# Patient Record
Sex: FEMALE
[De-identification: names, ages, dates, MRNs, and addresses within clinical notes are randomized per-mention and may not be internally consistent; named-entity substitution may affect disease eponyms.]

---

## 2018-02-18 ENCOUNTER — HOSPITAL ENCOUNTER (OUTPATIENT)
Dept: HOSPITAL 88 - ER | Age: 63
Setting detail: OBSERVATION
LOS: 2 days | Discharge: HOME | End: 2018-02-20
Attending: INTERNAL MEDICINE | Admitting: INTERNAL MEDICINE
Payer: MEDICARE

## 2018-02-18 VITALS — WEIGHT: 196.13 LBS | HEIGHT: 64 IN | BODY MASS INDEX: 33.48 KG/M2

## 2018-02-18 VITALS — SYSTOLIC BLOOD PRESSURE: 157 MMHG | DIASTOLIC BLOOD PRESSURE: 88 MMHG

## 2018-02-18 VITALS — DIASTOLIC BLOOD PRESSURE: 88 MMHG | SYSTOLIC BLOOD PRESSURE: 157 MMHG

## 2018-02-18 DIAGNOSIS — G47.00: ICD-10-CM

## 2018-02-18 DIAGNOSIS — E66.9: ICD-10-CM

## 2018-02-18 DIAGNOSIS — K52.9: Primary | ICD-10-CM

## 2018-02-18 DIAGNOSIS — D72.819: ICD-10-CM

## 2018-02-18 DIAGNOSIS — E86.0: ICD-10-CM

## 2018-02-18 DIAGNOSIS — Z90.49: ICD-10-CM

## 2018-02-18 DIAGNOSIS — I10: ICD-10-CM

## 2018-02-18 DIAGNOSIS — Z98.84: ICD-10-CM

## 2018-02-18 DIAGNOSIS — D64.9: ICD-10-CM

## 2018-02-18 DIAGNOSIS — E16.2: ICD-10-CM

## 2018-02-18 LAB
ALBUMIN SERPL-MCNC: 3.8 G/DL (ref 3.5–5)
ALBUMIN/GLOB SERPL: 1.1 {RATIO} (ref 0.8–2)
ALP SERPL-CCNC: 76 IU/L (ref 40–150)
ALT SERPL-CCNC: 10 IU/L (ref 0–55)
ANION GAP SERPL CALC-SCNC: 12.7 MMOL/L (ref 8–16)
BACTERIA URNS QL MICRO: (no result) /HPF
BASOPHILS # BLD AUTO: 0 10*3/UL (ref 0–0.1)
BASOPHILS NFR BLD AUTO: 0.7 % (ref 0–1)
BILIRUB UR QL: NEGATIVE
BUN SERPL-MCNC: 10 MG/DL (ref 7–26)
BUN/CREAT SERPL: 14 (ref 6–25)
CALCIUM SERPL-MCNC: 8.7 MG/DL (ref 8.4–10.2)
CHLORIDE SERPL-SCNC: 108 MMOL/L (ref 98–107)
CK MB SERPL-MCNC: 1 NG/ML (ref 0–5)
CK SERPL-CCNC: 87 IU/L (ref 29–168)
CLARITY UR: CLEAR
CO2 SERPL-SCNC: 22 MMOL/L (ref 22–29)
COLOR UR: YELLOW
DEPRECATED NEUTROPHILS # BLD AUTO: 2 10*3/UL (ref 2.1–6.9)
DEPRECATED RBC URNS MANUAL-ACNC: (no result) /HPF (ref 0–5)
EGFRCR SERPLBLD CKD-EPI 2021: > 60 ML/MIN (ref 60–?)
EOSINOPHIL # BLD AUTO: 0.1 10*3/UL (ref 0–0.4)
EOSINOPHIL NFR BLD AUTO: 1.1 % (ref 0–6)
EPI CELLS URNS QL MICRO: (no result) /LPF
ERYTHROCYTE [DISTWIDTH] IN CORD BLOOD: 15.3 % (ref 11.7–14.4)
GLOBULIN PLAS-MCNC: 3.6 G/DL (ref 2.3–3.5)
GLUCOSE SERPLBLD-MCNC: 39 MG/DL (ref 74–118)
HCT VFR BLD AUTO: 33.9 % (ref 34.2–44.1)
HGB BLD-MCNC: 10.7 G/DL (ref 12–16)
KETONES UR QL STRIP.AUTO: NEGATIVE
LEUKOCYTE ESTERASE UR QL STRIP.AUTO: NEGATIVE
LYMPHOCYTES # BLD: 1.7 10*3/UL (ref 1–3.2)
LYMPHOCYTES NFR BLD AUTO: 38.5 % (ref 18–39.1)
MCH RBC QN AUTO: 26.7 PG (ref 28–32)
MCHC RBC AUTO-ENTMCNC: 31.6 G/DL (ref 31–35)
MCV RBC AUTO: 84.5 FL (ref 81–99)
MONOCYTES # BLD AUTO: 0.6 10*3/UL (ref 0.2–0.8)
MONOCYTES NFR BLD AUTO: 14.2 % (ref 4.4–11.3)
NEUTS SEG NFR BLD AUTO: 45.3 % (ref 38.7–80)
NITRITE UR QL STRIP.AUTO: NEGATIVE
PLATELET # BLD AUTO: 200 X10E3/UL (ref 140–360)
POTASSIUM SERPL-SCNC: 3.7 MMOL/L (ref 3.5–5.1)
PROT UR QL STRIP.AUTO: NEGATIVE
RBC # BLD AUTO: 4.01 X10E6/UL (ref 3.6–5.1)
SODIUM SERPL-SCNC: 139 MMOL/L (ref 136–145)
SP GR UR STRIP: 1.01 (ref 1.01–1.02)
UROBILINOGEN UR STRIP-MCNC: 0.2 MG/DL (ref 0.2–1)
WBC #/AREA URNS HPF: (no result) /HPF (ref 0–5)

## 2018-02-18 PROCEDURE — 80053 COMPREHEN METABOLIC PANEL: CPT

## 2018-02-18 PROCEDURE — 81001 URINALYSIS AUTO W/SCOPE: CPT

## 2018-02-18 PROCEDURE — 74177 CT ABD & PELVIS W/CONTRAST: CPT

## 2018-02-18 PROCEDURE — 36415 COLL VENOUS BLD VENIPUNCTURE: CPT

## 2018-02-18 PROCEDURE — 84484 ASSAY OF TROPONIN QUANT: CPT

## 2018-02-18 PROCEDURE — 85025 COMPLETE CBC W/AUTO DIFF WBC: CPT

## 2018-02-18 PROCEDURE — 99284 EMERGENCY DEPT VISIT MOD MDM: CPT

## 2018-02-18 PROCEDURE — 85651 RBC SED RATE NONAUTOMATED: CPT

## 2018-02-18 PROCEDURE — 82553 CREATINE MB FRACTION: CPT

## 2018-02-18 PROCEDURE — 83036 HEMOGLOBIN GLYCOSYLATED A1C: CPT

## 2018-02-18 PROCEDURE — 96374 THER/PROPH/DIAG INJ IV PUSH: CPT

## 2018-02-18 PROCEDURE — 87086 URINE CULTURE/COLONY COUNT: CPT

## 2018-02-18 PROCEDURE — 93005 ELECTROCARDIOGRAM TRACING: CPT

## 2018-02-18 PROCEDURE — 82550 ASSAY OF CK (CPK): CPT

## 2018-02-18 PROCEDURE — 87493 C DIFF AMPLIFIED PROBE: CPT

## 2018-02-18 PROCEDURE — 82948 REAGENT STRIP/BLOOD GLUCOSE: CPT

## 2018-02-18 NOTE — DIAGNOSTIC IMAGING REPORT
EXAM: CT ABDOMEN/PELVIS W

DATE: 2/18/2018 6:05 PM  

INDICATION:  Abdominal pain

COMPARISON:  8/16/2016

TECHNIQUE: The abdomen and pelvis were scanned using a multidetector helical

scanner. Coronal and sagittal reformations were obtained. Routine protocol

performed.

IV Contrast: 100 ml Isovue 370



FINDINGS:

LOWER THORAX: Mild bibasilar atelectasis.



LIVER/BILIARY: Stable low-density right liver lesion, likely benign. Unchanged

mild biliary ductal dilation.



GALLBLADDER: Cholecystectomy.

SPLEEN: Unremarkable

PANCREAS: Unremarkable



ADRENALS: No nodules

KIDNEYS: Symmetric perfusion. Stable subcentimeter probable left inferior renal

cyst. No hydronephrosis.



GI TRACT: Postsurgical changes are seen status post gastric bypass and prior

sigmoidectomy. No evidence of obstruction or wall thickening. Normal appendix.



VESSELS: Mild to moderate atherosclerotic changes.

PERITONEUM/RETROPERITONEUM: No free air or fluid

LYMPH NODES: No lymphadenopathy



REPRODUCTIVE ORGANS/BLADDER: Hysterectomy. Bladder is mildly distended but

otherwise unremarkable.



SOFT TISSUES: Unremarkable

BONES: There are scattered degenerative changes, worse at L5-S1.



IMPRESSION:

No acute abnormality.

 



Signed by: Dr Gertrude Kennedy MD on 2/18/2018 8:43 PM

## 2018-02-19 VITALS — DIASTOLIC BLOOD PRESSURE: 70 MMHG | SYSTOLIC BLOOD PRESSURE: 126 MMHG

## 2018-02-19 VITALS — DIASTOLIC BLOOD PRESSURE: 70 MMHG | SYSTOLIC BLOOD PRESSURE: 119 MMHG

## 2018-02-19 VITALS — SYSTOLIC BLOOD PRESSURE: 135 MMHG | DIASTOLIC BLOOD PRESSURE: 75 MMHG

## 2018-02-19 VITALS — SYSTOLIC BLOOD PRESSURE: 126 MMHG | DIASTOLIC BLOOD PRESSURE: 70 MMHG

## 2018-02-19 VITALS — DIASTOLIC BLOOD PRESSURE: 71 MMHG | SYSTOLIC BLOOD PRESSURE: 125 MMHG

## 2018-02-19 VITALS — DIASTOLIC BLOOD PRESSURE: 50 MMHG | SYSTOLIC BLOOD PRESSURE: 102 MMHG

## 2018-02-19 VITALS — DIASTOLIC BLOOD PRESSURE: 76 MMHG | SYSTOLIC BLOOD PRESSURE: 121 MMHG

## 2018-02-19 LAB
ALBUMIN SERPL-MCNC: 3.4 G/DL (ref 3.5–5)
ALBUMIN/GLOB SERPL: 1.1 {RATIO} (ref 0.8–2)
ALP SERPL-CCNC: 68 IU/L (ref 40–150)
ALT SERPL-CCNC: 8 IU/L (ref 0–55)
ANION GAP SERPL CALC-SCNC: 11.2 MMOL/L (ref 8–16)
BASOPHILS # BLD AUTO: 0 10*3/UL (ref 0–0.1)
BASOPHILS NFR BLD AUTO: 0.8 % (ref 0–1)
BUN SERPL-MCNC: 8 MG/DL (ref 7–26)
BUN/CREAT SERPL: 11 (ref 6–25)
CALCIUM SERPL-MCNC: 8.4 MG/DL (ref 8.4–10.2)
CHLORIDE SERPL-SCNC: 106 MMOL/L (ref 98–107)
CO2 SERPL-SCNC: 22 MMOL/L (ref 22–29)
DEPRECATED NEUTROPHILS # BLD AUTO: 1.9 10*3/UL (ref 2.1–6.9)
EGFRCR SERPLBLD CKD-EPI 2021: > 60 ML/MIN (ref 60–?)
EOSINOPHIL # BLD AUTO: 0.1 10*3/UL (ref 0–0.4)
EOSINOPHIL NFR BLD AUTO: 1.6 % (ref 0–6)
ERYTHROCYTE [DISTWIDTH] IN CORD BLOOD: 15.3 % (ref 11.7–14.4)
GLOBULIN PLAS-MCNC: 3.2 G/DL (ref 2.3–3.5)
GLUCOSE SERPLBLD-MCNC: 91 MG/DL (ref 74–118)
HCT VFR BLD AUTO: 34.9 % (ref 34.2–44.1)
HGB BLD-MCNC: 10.7 G/DL (ref 12–16)
LYMPHOCYTES # BLD: 1.2 10*3/UL (ref 1–3.2)
LYMPHOCYTES NFR BLD AUTO: 33.3 % (ref 18–39.1)
MCH RBC QN AUTO: 26.4 PG (ref 28–32)
MCHC RBC AUTO-ENTMCNC: 30.7 G/DL (ref 31–35)
MCV RBC AUTO: 86 FL (ref 81–99)
MONOCYTES # BLD AUTO: 0.4 10*3/UL (ref 0.2–0.8)
MONOCYTES NFR BLD AUTO: 11.7 % (ref 4.4–11.3)
NEUTS SEG NFR BLD AUTO: 52.3 % (ref 38.7–80)
PLATELET # BLD AUTO: 167 X10E3/UL (ref 140–360)
POTASSIUM SERPL-SCNC: 4.2 MMOL/L (ref 3.5–5.1)
RBC # BLD AUTO: 4.06 X10E6/UL (ref 3.6–5.1)
SODIUM SERPL-SCNC: 135 MMOL/L (ref 136–145)

## 2018-02-19 RX ADMIN — HYDROMORPHONE HYDROCHLORIDE PRN MG: 1 INJECTION, SOLUTION INTRAMUSCULAR; INTRAVENOUS; SUBCUTANEOUS at 12:02

## 2018-02-19 RX ADMIN — SODIUM CHLORIDE PRN MG: 900 INJECTION INTRAVENOUS at 20:35

## 2018-02-19 RX ADMIN — CLONAZEPAM SCH MG: 1 TABLET ORAL at 21:49

## 2018-02-19 RX ADMIN — METRONIDAZOLE SCH MLS/HR: 500 INJECTION, SOLUTION INTRAVENOUS at 14:46

## 2018-02-19 RX ADMIN — HYDROMORPHONE HYDROCHLORIDE PRN MG: 1 INJECTION, SOLUTION INTRAMUSCULAR; INTRAVENOUS; SUBCUTANEOUS at 16:15

## 2018-02-19 RX ADMIN — METRONIDAZOLE SCH MLS/HR: 500 INJECTION, SOLUTION INTRAVENOUS at 21:00

## 2018-02-19 RX ADMIN — METRONIDAZOLE SCH MLS/HR: 500 INJECTION, SOLUTION INTRAVENOUS at 06:16

## 2018-02-19 RX ADMIN — SODIUM CHLORIDE PRN MG: 900 INJECTION INTRAVENOUS at 12:02

## 2018-02-19 RX ADMIN — SODIUM CHLORIDE PRN MG: 900 INJECTION INTRAVENOUS at 06:32

## 2018-02-19 RX ADMIN — CLONAZEPAM SCH MG: 1 TABLET ORAL at 08:21

## 2018-02-19 RX ADMIN — SODIUM CHLORIDE PRN MG: 900 INJECTION INTRAVENOUS at 16:25

## 2018-02-19 RX ADMIN — LEVOFLOXACIN SCH MLS/HR: 5 INJECTION, SOLUTION INTRAVENOUS at 06:16

## 2018-02-19 RX ADMIN — PANTOPRAZOLE SODIUM SCH MG: 40 TABLET, DELAYED RELEASE ORAL at 08:21

## 2018-02-19 RX ADMIN — HYDROMORPHONE HYDROCHLORIDE PRN MG: 1 INJECTION, SOLUTION INTRAMUSCULAR; INTRAVENOUS; SUBCUTANEOUS at 20:35

## 2018-02-19 RX ADMIN — HYDROMORPHONE HYDROCHLORIDE PRN MG: 1 INJECTION, SOLUTION INTRAMUSCULAR; INTRAVENOUS; SUBCUTANEOUS at 06:31

## 2018-02-19 RX ADMIN — CLONAZEPAM SCH MG: 1 TABLET ORAL at 14:46

## 2018-02-19 NOTE — HISTORY AND PHYSICAL
PRIMARY CARE PHYSICIAN:  Dr. Chávez



CHIEF COMPLAINT:  Diarrhea and abdominal pain.



HISTORY OF PRESENT ILLNESS:  This is a 62-year-old woman with a history of 

diverticulosis now developing abdominal pain and diarrhea for the past 2 

days after eating a local restaurant.  Abdominal pain is in the lower 

quadrant.  No vomiting.  She has been nauseous.  She denies any fever, 

chest pain or sweats.  The patient felt fatigued and dehydrated.  The 

patient was found to be hypoglycemic.  Glucose was as low as 39.  



PAST MEDICAL HISTORY:  Chronic pain syndrome, diverticulosis, 

diverticulitis, status post colon resection times 2, small-bowel 

obstruction, status post surgical management, insomnia, and anxiety 

disorder. 



PAST SURGICAL HISTORY:  Colon resection for diverticulitis, small-bowel 

obstruction related surgeries, carpal tunnel release surgery, 

hemorrhoidectomy, cholecystectomy, gastric bypass in 2001.  



ALLERGIES:  PER ELECTRONIC RECORDS.



FAMILY HISTORY/SOCIAL HISTORY:  Patient is .  She has 4 children.  

No alcohol or illicits.



MEDICATIONS:  Per electronic medical record.



REVIEW OF SYSTEMS:  Denies any dizziness, chest pain.



PHYSICAL EXAMINATION 

VITAL SIGNS:  Reviewed.

GENERAL:  A tired-appearing woman resting in bed. 

HEENT:  Anicteric.  Pupils respond to light.  No oral lesions.

CARDIOVASCULAR:  Normal S1 and S2.  

LUNGS:  Moderate breath sounds.  

ABDOMEN:  Soft and nondistended.  She has tenderness in the lower quadrant. 

 No rebound or guarding.  

EXTREMITIES:  No edema or calf tenderness.

NEUROLOGICAL:  Alert and oriented times 3.  Moving all extremities.

SKIN:  Dry.

PSYCHIATRIC:  Flat affect.



LABS:  Reviewed.



MEDICATIONS:  Reviewed.



ASSESSMENT AND PLAN:  This is a 62-year-old woman with:

1.  Acute gastroenteritis:  Will start Flagyl and Levaquin.  Will obtain 

stool for Clostridium difficile.  We will rehydrate the patient with 

intravenous fluids.

2.  Hypoglycemia, improved:  Add D5 to fluids if needed.

3.  Obesity:  Screen for diabetes and obtain lipid panel.

4.  Hypertension:  Restart beta blocker.

5.  Abdominal pain:  P.r.n. pain medications.

6.  Insomnia:  Restart Ambien.

7.  Normocytic anemia:  Will follow.

8.  Mild leukopenia:  Will follow.

9.  Prophylaxis:  Will use Lovenox and proton pump inhibitor.

10. Disposition:  Monitor closely.  







DD:  02/19/2018 05:14

DT:  02/19/2018 05:22

Job#:  [id number]

## 2018-02-20 VITALS — DIASTOLIC BLOOD PRESSURE: 72 MMHG | SYSTOLIC BLOOD PRESSURE: 122 MMHG

## 2018-02-20 VITALS — DIASTOLIC BLOOD PRESSURE: 91 MMHG | SYSTOLIC BLOOD PRESSURE: 145 MMHG

## 2018-02-20 VITALS — SYSTOLIC BLOOD PRESSURE: 114 MMHG | DIASTOLIC BLOOD PRESSURE: 69 MMHG

## 2018-02-20 VITALS — SYSTOLIC BLOOD PRESSURE: 147 MMHG | DIASTOLIC BLOOD PRESSURE: 75 MMHG

## 2018-02-20 VITALS — SYSTOLIC BLOOD PRESSURE: 145 MMHG | DIASTOLIC BLOOD PRESSURE: 91 MMHG

## 2018-02-20 RX ADMIN — PANTOPRAZOLE SODIUM SCH MG: 40 TABLET, DELAYED RELEASE ORAL at 06:01

## 2018-02-20 RX ADMIN — SODIUM CHLORIDE PRN MG: 900 INJECTION INTRAVENOUS at 09:33

## 2018-02-20 RX ADMIN — SODIUM CHLORIDE PRN MG: 900 INJECTION INTRAVENOUS at 01:00

## 2018-02-20 RX ADMIN — HYDROMORPHONE HYDROCHLORIDE PRN MG: 1 INJECTION, SOLUTION INTRAMUSCULAR; INTRAVENOUS; SUBCUTANEOUS at 05:25

## 2018-02-20 RX ADMIN — METRONIDAZOLE SCH MLS/HR: 500 INJECTION, SOLUTION INTRAVENOUS at 05:23

## 2018-02-20 RX ADMIN — HYDROMORPHONE HYDROCHLORIDE PRN MG: 1 INJECTION, SOLUTION INTRAMUSCULAR; INTRAVENOUS; SUBCUTANEOUS at 09:32

## 2018-02-20 RX ADMIN — SODIUM CHLORIDE PRN MG: 900 INJECTION INTRAVENOUS at 05:25

## 2018-02-20 RX ADMIN — HYDROMORPHONE HYDROCHLORIDE PRN MG: 1 INJECTION, SOLUTION INTRAMUSCULAR; INTRAVENOUS; SUBCUTANEOUS at 13:15

## 2018-02-20 RX ADMIN — HYDROMORPHONE HYDROCHLORIDE PRN MG: 1 INJECTION, SOLUTION INTRAMUSCULAR; INTRAVENOUS; SUBCUTANEOUS at 01:00

## 2018-02-20 RX ADMIN — CLONAZEPAM SCH MG: 1 TABLET ORAL at 08:24

## 2018-02-20 RX ADMIN — SODIUM CHLORIDE PRN MG: 900 INJECTION INTRAVENOUS at 13:15

## 2018-02-20 RX ADMIN — LEVOFLOXACIN SCH MLS/HR: 5 INJECTION, SOLUTION INTRAVENOUS at 06:21

## 2018-02-20 NOTE — PROGRESS NOTE
DATE:  February 20, 2018 



TIME:  7:00 a.m.



OVERNIGHT:  Complained of pain. 



REVIEW OF SYSTEMS:  Denies any dizziness.  One diarrhea episode overnight. 



PHYSICAL EXAM

VITAL SIGNS:  Reviewed.

GENERAL:  A tired-appearing woman resting in bed. 

HEENT:  Anicteric.  

CARDIOVASCULAR:  Normal S1/S2.  

LUNGS:  Moderate breath sounds.  

ABDOMEN:  Soft, nondistended.  Tenderness in the lower quadrant of the 

abdomen. 

EXTREMITIES:  No edema.

NEUROLOGIC:  Alert, awake, oriented times 3.  Moving extremities. 

SKIN:  Dry.

PSYCHIATRIC:  Flat affect.



LABS:  Reviewed.



MEDICATIONS:  Reviewed.



ASSESSMENT:  This is a 62-year-old woman. 

1. Acute gastroenteritis.  Will continue with antibiotics.  Clostridium 

difficile testing is negative.  Discharge planning.  There is no 

leukocytosis.  Patient can be transitioned home with p.o. medication.  

2. Hypoglycemia, resolved. 

3. Obesity. 

4. Hemoglobin A1c is 5.0.  Does not have diabetes. 

5. Hypertension.  Continue medication and blood pressure control. 

6. Abdominal pain, improving.  Continue pain medication. 

7. Insomnia. 

8. Normocytic anemia. 

9. Mild leukopenia. 

10. Prophylaxis.  Continue proton pump inhibitor. 

11. Disposition:  Likely discharge home later today. 









DD:  02/20/2018 07:14

DT:  02/20/2018 07:22

Job#:  [id number]

## 2018-03-29 ENCOUNTER — HOSPITAL ENCOUNTER (EMERGENCY)
Dept: HOSPITAL 88 - ER | Age: 63
Discharge: LEFT BEFORE DISCHARGE | End: 2018-03-29
Payer: MEDICARE

## 2018-03-29 VITALS — WEIGHT: 180 LBS | BODY MASS INDEX: 30.73 KG/M2 | HEIGHT: 64 IN

## 2018-03-29 DIAGNOSIS — M54.9: Primary | ICD-10-CM

## 2018-03-29 LAB
ALBUMIN SERPL-MCNC: 3.4 G/DL (ref 3.5–5)
ALBUMIN/GLOB SERPL: 1 {RATIO} (ref 0.8–2)
ALP SERPL-CCNC: 78 IU/L (ref 40–150)
ALT SERPL-CCNC: 6 IU/L (ref 0–55)
AMYLASE SERPL-CCNC: 62 U/L (ref 25–125)
ANION GAP SERPL CALC-SCNC: 12.2 MMOL/L (ref 8–16)
BACTERIA URNS QL MICRO: (no result) /HPF
BASOPHILS # BLD AUTO: 0 10*3/UL (ref 0–0.1)
BASOPHILS NFR BLD AUTO: 0.3 % (ref 0–1)
BILIRUB UR QL: NEGATIVE
BUN SERPL-MCNC: 9 MG/DL (ref 7–26)
BUN/CREAT SERPL: 13 (ref 6–25)
CALCIUM SERPL-MCNC: 9.1 MG/DL (ref 8.4–10.2)
CHLORIDE SERPL-SCNC: 105 MMOL/L (ref 98–107)
CK MB SERPL-MCNC: 0.6 NG/ML (ref 0–5)
CK SERPL-CCNC: 52 IU/L (ref 29–168)
CLARITY UR: CLEAR
CO2 SERPL-SCNC: 25 MMOL/L (ref 22–29)
COLOR UR: YELLOW
DEPRECATED APTT PLAS QN: 28.3 SECONDS (ref 23.8–35.5)
DEPRECATED INR PLAS: 1.01
DEPRECATED NEUTROPHILS # BLD AUTO: 4.5 10*3/UL (ref 2.1–6.9)
DEPRECATED RBC URNS MANUAL-ACNC: (no result) /HPF (ref 0–5)
EGFRCR SERPLBLD CKD-EPI 2021: > 60 ML/MIN (ref 60–?)
EOSINOPHIL # BLD AUTO: 0.1 10*3/UL (ref 0–0.4)
EOSINOPHIL NFR BLD AUTO: 2 % (ref 0–6)
EPI CELLS URNS QL MICRO: (no result) /LPF
ERYTHROCYTE [DISTWIDTH] IN CORD BLOOD: 14.4 % (ref 11.7–14.4)
GLOBULIN PLAS-MCNC: 3.5 G/DL (ref 2.3–3.5)
GLUCOSE SERPLBLD-MCNC: 112 MG/DL (ref 74–118)
HCT VFR BLD AUTO: 32.4 % (ref 34.2–44.1)
HGB BLD-MCNC: 10.6 G/DL (ref 12–16)
KETONES UR QL STRIP.AUTO: NEGATIVE
LEUKOCYTE ESTERASE UR QL STRIP.AUTO: (no result)
LIPASE SERPL-CCNC: 37 U/L (ref 8–78)
LYMPHOCYTES # BLD: 1 10*3/UL (ref 1–3.2)
LYMPHOCYTES NFR BLD AUTO: 16.6 % (ref 18–39.1)
MAGNESIUM SERPL-MCNC: 1.8 MG/DL (ref 1.3–2.1)
MCH RBC QN AUTO: 27.4 PG (ref 28–32)
MCHC RBC AUTO-ENTMCNC: 32.7 G/DL (ref 31–35)
MCV RBC AUTO: 83.7 FL (ref 81–99)
MONOCYTES # BLD AUTO: 0.4 10*3/UL (ref 0.2–0.8)
MONOCYTES NFR BLD AUTO: 6.9 % (ref 4.4–11.3)
NEUTS SEG NFR BLD AUTO: 73.9 % (ref 38.7–80)
NITRITE UR QL STRIP.AUTO: NEGATIVE
PLATELET # BLD AUTO: 202 X10E3/UL (ref 140–360)
POTASSIUM SERPL-SCNC: 4.2 MMOL/L (ref 3.5–5.1)
PROT UR QL STRIP.AUTO: NEGATIVE
PROTHROMBIN TIME: 12.5 SECONDS (ref 11.9–14.5)
RBC # BLD AUTO: 3.87 X10E6/UL (ref 3.6–5.1)
SODIUM SERPL-SCNC: 138 MMOL/L (ref 136–145)
SP GR UR STRIP: 1.01 (ref 1.01–1.02)
UROBILINOGEN UR STRIP-MCNC: 0.2 MG/DL (ref 0.2–1)
WBC #/AREA URNS HPF: (no result) /HPF (ref 0–5)

## 2018-03-29 PROCEDURE — 80053 COMPREHEN METABOLIC PANEL: CPT

## 2018-03-29 PROCEDURE — 82550 ASSAY OF CK (CPK): CPT

## 2018-03-29 PROCEDURE — 85730 THROMBOPLASTIN TIME PARTIAL: CPT

## 2018-03-29 PROCEDURE — 85025 COMPLETE CBC W/AUTO DIFF WBC: CPT

## 2018-03-29 PROCEDURE — 36415 COLL VENOUS BLD VENIPUNCTURE: CPT

## 2018-03-29 PROCEDURE — 83690 ASSAY OF LIPASE: CPT

## 2018-03-29 PROCEDURE — 82150 ASSAY OF AMYLASE: CPT

## 2018-03-29 PROCEDURE — 84484 ASSAY OF TROPONIN QUANT: CPT

## 2018-03-29 PROCEDURE — 82553 CREATINE MB FRACTION: CPT

## 2018-03-29 PROCEDURE — 81001 URINALYSIS AUTO W/SCOPE: CPT

## 2018-03-29 PROCEDURE — 85610 PROTHROMBIN TIME: CPT

## 2018-03-29 PROCEDURE — 83735 ASSAY OF MAGNESIUM: CPT

## 2018-03-29 NOTE — XMS REPORT
Continuity of Care Document

 Created on: 2018



RENETTA FUNEZ

External Reference #: [id number]

: 1955

Sex: Female



Demographics







 Address  [address]

[place]  [address]

 

 Home Phone  [phone]

 

 Preferred Language  Unknown

 

 Marital Status  Unknown

 

 Anabaptism Affiliation  Unknown

 

 Race  Other

 

 Additional Race(s)  

 

 Ethnic Group  Unknown





Author







 Author  [facility]

 

 Organization  [facility]

 

 Address  [address]

[place]  [address]



 

 Phone  Unavailable







Support







 Name  Relationship  Address  Phone

 

 COCO MARTIN MD  Caregiver  [address] [place]  [address]  Unavailable

 

 COCO MARTIN MD  Caregiver  [address] [place]  [address]  Unavailable

 

 CHRISTIAN UGARTE MD  Caregiver  [address]

[place]  [address]  Unavailable

 

 ISAAC YUSUF MD  Caregiver  [address]

[address]

[place]  [address]  [phone]

 

 ISAAC YUSUF MD  Caregiver  [address]

[address]

[place]  [address]  [phone]

 

 CEASAR PIÑA  [place]  [address]  [phone]







Care Team Providers







 Care Team Member Name  Role  Phone

 

 ISAAC YUSUF MD  PCP  [phone]







Insurance Providers







 Guarantor  Renetta Funez

 

 Address  [address]

[address]

[place] [address]

 

 Phone  [phone]











 Payer  [facility] [employer]

 

 Policy Number  [id number]

 

 Subscriber's Name  Renetta Funez

 

 Relationship  18 Self / Same As Patient

 

 Group Number  [id number]







Advance Directives







 Directive  Response  Recorded Date/Time

 

 Does the patient have an advance directive?  No  18 10:57pm

 

 If yes, is advance directive on file with [facility]?  No  18 10:57pm

 

 If not on file with [facility] will patient provide a copy?  No  18 10:57pm

 

 Do you have a Directive to Physician?  No  18 6:32pm

 

 Do you have a Medical Power of ?  No  18 6:32pm

 

 Do you have an out of hospital Do Not Resuscitate Order?  No  18 6:32pm

 

 Do you have any special needs we should be aware of?  No  18 6:32pm

 

 Do you have a support person here with you today?  Yes  18 6:32pm

 

 Did patient receive Notice of Privacy Practices?  Yes  18 6:32pm

 

 Did patient receive patient rights and responsibilities?  Yes  18 6:32pm







Problems







 Medical Problem  Onset Date  Status

 

 Acute diarrhea  Unknown   

 

 Chest pain  Unknown   

 

 Chronic abdominal pain  Unknown  Acute

 

 Constipation by delayed colonic transit  Unknown  Acute

 

 Hypoglycemia  Unknown   







Medications





Current Home Medications





 Medication  Dose  Units  Route  Directions  Days  Qty  Instructions  Start Date

 

 Clonazepam 1 Mg Tablet  1  Mg  Oral  Three Times A Day            

 

 Esomeprazole Magnesium (Nexium) 40 Mg Capsule.  40  Mg  Oral  Daily        
PROTONIX THERAPEUTIC SUBSTITUTE FOR NEXIUM PER [facility]   

 

 Hydrocodone Bit/Acetaminophen (Norco  Tablet) 1 Each Tablet  10  Mg     
Every 4 Hours     50       

 

 Levofloxacin (Levaquin) 500 Mg Tablet  500  Mg  Oral  Daily  7 Days        

 

 Loperamide Hcl (Imodium*) 2 Mg Cap  2  Mg  Oral  Every 6 Hours as needed for 
Diarrhea  14 Days        18

 

 Metoprolol Succinate 25 Mg Tab.er.24h  25  Mg  Oral  As Needed            

 

 Metronidazole (Flagyl) 500 Mg Tablet  500  Mg  Oral  Three Times A Day  7 Days
        18

 

 Tramadol Hcl (Ultram 50MG*) 50 Mg Tab  50  Mg  Oral  Every 8 Hours as needed 
for Pain     20 Tab     18

 

 Zolpidem Tartrate (Ambien) 5 Mg Tablet  10  Mg  Oral  Bedtime            









Past Home Medications





 Medication  Directions  Ordered  Status

 

 Calcium Carbonate/Vitamin D3 (Calcium 500+D Tablet Chew) 1 Each Tab.chew, 2 
Tab Oral  Daily     Discontinued

 

 Clomiphene Citrate (Serophene) 50 Mg Tablet, 50 Mg Oral  At Bedtime     
Discontinued

 

 Clonazepam 0.5 Mg Tablet, 0.5 Mg Oral  Daily     Discontinued

 

 Clonazepam 0.5 Mg Tab.rapdis, 0.5 Mg Oral  As Needed     Discontinued

 

 Esomeprazole Mag Trihydrate (Nexium) 40 Mg Capsule., 1 Tab Oral  Daily     
Discontinued

 

 Ferrous Sulfate 325 Mg Tablet, 325 Mg Oral  Daily     Discontinued

 

 Hydrocodone Bit/Acetaminophen (Norco 5-325 Tablet) 1 Each Tablet, 1 Each Oral  
Q4-6 Hrs as needed for Pain     Discontinued

 

 Losartan Potassium 50 Mg Tablet, 50 Tab Oral  Daily     Discontinued

 

 Metoprolol Succinate (Toprol Xl) 50 Mg Tab.sr.24h, 50 Tab Oral  Daily     
Discontinued

 

 Metoprolol Tartrate 50 Mg Tablet, 50 Mg Oral  Daily     Discontinued

 

 Paroxetine Hcl (Paxil) 40 Mg Tablet, 1 Tab Oral  Daily     Discontinued

 

 Quetiapine Fumarate (Seroquel) 50 Mg Tablet, 1 Tab Oral  Daily     Discontinued

 

 Zolpidem Tartrate (Ambien) 10 Mg Tablet, 10 Mg Oral  Bedtime     Discontinued







Social History







 Social History Problem  Response  Recorded Date/Time  Onset Date  Status

 

 Hx Psychiatric Problems  No  2018 10:57pm  Not Applicable  Not Applicable

 

 Hx Eating Disorder  No  2018 10:57pm  Not Applicable  Not Applicable

 

 Hx Substance Use Disorder  No  2018 10:57pm  Not Applicable  Not 
Applicable

 

 Hx Depression  No  2018 10:57pm  Not Applicable  Not Applicable

 

 Hx Alcohol Use  No  2018 10:57pm  Not Applicable  Not Applicable

 

 Hx Substance Use Treatment  No  2018 10:57pm  Not Applicable  Not 
Applicable

 

 Hx Physical Abuse  No  2018 10:57pm  Not Applicable  Not Applicable











 Smoking Status  Start Date  Stop Date

 

 Former smoker      







Hospital Discharge Instructions

No hospital discharge instruction information available.



Plan of Care







 Discharge Date  18 2:01pm

 

 Disposition  HOME, SELF-CARE

 

 Instructions/Education Provided  Dehydration - Adult

 

 Prescriptions  See Medication Section

 

 Referrals  pcp (Internal Medicine)

Order Date: 5-7 Days

Entered Date: 2018 7:17am







Functional Status







 Query  Response  Date Recorded

 

 Assistive Devices  None

  2018 11:02pm

 

 Ambulation Ability  Independent

  2018 11:02pm

 

 Toileting Ability  Independent

  2018 1:11pm







Allergies, Adverse Reactions, Alerts







 Allergen  Type  Severity  Reaction  Status  Last Updated

 

 Morphine  Adverse Reaction  Mild  RASH  Active  18







Immunizations

No immunization information available.



Vital Signs





Acute Vital Signs





 Vital  Response  Date/Time

 

 Temperature (Fahrenheit)  98.7 degrees F (97.6 - 99.5)  2018 11:51am

 

 Pulse      

 

    Pulse Rate (adult)  74 bpm (60 - 90)  2018 11:51am

 

 Respiratory Rate  20 bpm (12 - 24)  2018 11:51am

 

 Blood Pressure  114/69 mm Hg  2018 11:51am

 

 Height  5 ft 4 in  2018 5:50pm

 

 Weight  196.13 lb  2018 10:26pm

 

 Body Mass Index  33.7 kg/m^2  2018 10:57pm







Results





Laboratory Results





 Test Name  Result  Units  Flags  Reference  Collection Date/Time  Result Date/
Time  Comments

 

 White Blood Count  3.66  x10e3/uL   L  4.8-10.8  2018 5:2018 
6:25am   

 

 Red Blood Count  4.06  x10e6/uL     3.6-5.1  2018 5:2018 6:
25am   

 

 Hemoglobin  10.7  g/dL   L  12.0-16.0  2018 5:2018 6:25am   

 

 Hematocrit  34.9  %     34.2-44.1  2018 5:2018 6:25am   

 

 Mean Corpuscular Volume  86.0  fL     81-99  2018 5:2018 6:
25am   

 

 Mean Corpuscular Hemoglobin  26.4  pg   L  28-32  2018 5:2018 6:25am   

 

 Mean Corpuscular Hemoglobin Concent  30.7  g/dL   L  31-35  2018 5:2018 6:25am   

 

 Red Cell Distribution Width  15.3  %   H  11.7-14.4  2018 5:2018 6:25am   

 

 Platelet Count  167  x10e3/uL     140-360  2018 5:2018 6:
25am   

 

 Neutrophils (%) (Auto)  52.3  %     38.7-80.0  2018 5:2018 6:
25am   

 

 Lymphocytes (%) (Auto)  33.3  %     18.0-39.1  2018 5:2018 6:
25am   

 

 Monocytes (%) (Auto)  11.7   %   H  4.4-11.3  2018 5:2018 6:
25am   

 

 Eosinophils (%) (Auto)  1.6  %     0.0-6.0  2018 5:2018 6:
25am   

 

 Basophils (%) (Auto)  0.8  %     0.0-1.0  2018 5:2018 6:25am
   

 

 IM GRANULOCYTES %  0.3  %     0.0-1.0  2018 5:23am  2018 6:25am   

 

 Neutrophils # (Auto)  1.9      L  2.1-6.9  2018 5:23am  2018 6:
25am   

 

 Lymphocytes # (Auto)  1.2        1.0-3.2  2018 5:23am  2018 6:25am
   

 

 Monocytes # (Auto)  0.4        0.2-0.8  2018 5:23am  2018 6:25am   

 

 Eosinophils # (Auto)  0.1        0.0-0.4  2018 5:23am  2018 6:25am
   

 

 Basophils # (Auto)  0.0        0.0-0.1  2018 5:23am  2018 6:25am   

 

 Absolute Immature Granulocyte (auto  0.01  x10e3/uL     0-0.1  2018 5:
23am  2018 6:25am   

 

 Erythrocyte Sedimentation Rate  8  mm/hr     0-20  2018 8:30am  2018 9:29am   

 

 Urine Color  YELLOW        YELLOW  2018 9:30pm  2018 9:36pm   

 

 Urine Clarity  CLEAR        CLEAR  2018 9:30pm  2018 9:36pm   

 

 Urine Specific Gravity  1.015        1.010-1.025  2018 9:30pm  2018 9:36pm   

 

 Urine pH  6        5 - 7  2018 9:30pm  2018 9:36pm   

 

 Urine Leukocyte Esterase  NEGATIVE        NEGATIVE  2018 9:30pm  2018 9:36pm   

 

 Urine Nitrite  NEGATIVE        NEGATIVE  2018 9:30pm  2018 9:36pm 
  

 

 Urine Protein  NEGATIVE        NEGATIVE  2018 9:30pm  2018 9:36pm 
  

 

 Urine Glucose (UA)  NEGATIVE        NEGATIVE  2018 9:30pm  2018 9:
36pm   

 

 Urine Ketones  NEGATIVE        NEGATIVE  2018 9:30pm  2018 9:36pm 
  

 

 Urine Urobilinogen  0.2  mg/dL     0.2 - 1  2018 9:30pm  2018 9:
36pm   

 

 Urine Bilirubin  NEGATIVE        NEGATIVE  2018 9:30pm  2018 9:
36pm   

 

 Urine Blood  TRACE      H  NEGATIVE  2018 9:30pm  2018 9:36pm   

 

 Urine WBC  0-5  /HPF     0-5  2018 9:30pm  2018 9:43pm   

 

 Urine RBC  0-5  /HPF     0-5  2018 9:30pm  2018 9:43pm   

 

 Urine Bacteria  MODERATE  /HPF   H  NONE  2018 9:30pm  2018 9:43pm
   

 

 Urine Epithelial Cells  RARE  /LPF     NONE  2018 9:30pm  2018 9:
43pm   

 

 Sodium Level  135  mmol/L   L  136-145  2018 5:23am  2018 7:00am   

 

 Potassium Level  4.2  mmol/L     3.5-5.1  2018 5:23am  2018 7:00am
   

 

 Chloride Level  106  mmol/L       2018 5:23am  2018 7:00am   

 

 Carbon Dioxide Level  22  mmol/L     22-29  2018 5:23am  2018 7:
00am   

 

 Anion Gap  11.2  mmol/L     8-16  2018 5:23am  2018 7:00am   

 

 Blood Urea Nitrogen  8  mg/dL     7-26  2018 5:23am  2018 7:00am   

 

 Creatinine  0.71  mg/dL     0.57-1.11  2018 5:23am  2018 7:00am   

 

 BUN/Creatinine Ratio  11        6-25  2018 5:23am  2018 7:00am   

 

 Estimat Glomerular Filtration Rate  > 60  ML/MIN     60-  2018 5:23am   7:00am  Ranges were taken from the National Kidney Disease Education 

Program and the National Kid[employer] Foundation literature.







Reference ranges:



 60 or greater: Normal



 16-59 (for 3 consecutive months): Chronic kidney disease 



 15 or less: Kidney failure

 

 Glucose Level  91  mg/dL       2018 5:23am  2018 7:00am   

 

 Calcium Level  8.4  mg/dL     8.4-10.2  2018 5:23am  2018 7:00am   

 

 Bedside Glucose  105  mg/dL       2018 11:40am  2018 11:47am
  Meter ID: [id number]



 

 Hemoglobin A1c Percent  5.0  %     4.0-7.0  2018 5:23am  2018 7:
35am   

 

 Total Bilirubin  0.5  mg/dL     0.2-1.2  2018 5:23am  2018 7:00am 
  

 

 Aspartate Amino Transf (AST/SGOT)  18  IU/L     5-34  2018 5:23am  2018 7:00am   

 

 Alanine Aminotransferase (ALT/SGPT)  8  IU/L     0-55  2018 5:23am   7:00am   

 

 Total Protein  6.6  g/dL     6.5-8.1  2018 5:23am  2018 7:00am   

 

 Albumin  3.4  g/dL   L  3.5-5.0  2018 5:23am  2018 7:00am   

 

 Globulin  3.2  g/dL     2.3-3.5  2018 5:23am  2018 7:00am   

 

 Albumin/Globulin Ratio  1.1        0.8-2.0  2018 5:23am  2018 7:
00am   

 

 Alkaline Phosphatase  68  IU/L       2018 5:23am  2018 7:
00am   

 

 Creatine Kinase  87  IU/L       2018 5:50pm  2018 6:47pm   

 

 Creatine Kinase MB  1.00  ng/mL     0-5.0  2018 5:50pm  2018 6:
47pm   

 

 Troponin I  < 0.00  ng/mL   L  0.0-0.78  2018 5:50pm  2018 6:47pm 
  

 

 Clostridium Difficile Toxin A & B  NEGATIVE        NEGATIVE  2018 4:35pm
  2018 8:22pm  Testing on stool aspirate specimens is outside 
 

claims since specimen type not validated on this assay.







Procedures







 Procedure  Status  Date  Provider(s)

 

 Computed tomography of abdomen and pelvis with contrast  Active  18  
CHRISTIAN UGARTE MD







Encounters







 Encounter  Location  Arrival/Admit Date  Discharge/Depart Date  Attending 
Provider

 

 Discharged Inpatient (obs)  [facility]  18 9:23pm   2:01pm  COCO MARTIN MD

## 2018-03-29 NOTE — XMS REPORT
Patient Summary Document

 Created on: 2018



JOAN RUIZ

External Reference #: [id number]

: 1955

Sex: Female



Demographics







 Address  [address] [place] [address]  [address]

 

 Home Phone  [phone]

 

 Preferred Language  Unknown

 

 Marital Status  Unknown

 

 Lutheran Affiliation  Unknown

 

 Race  Unknown

 

 Additional Race(s)  

 

 Ethnic Group  Unknown





Author







 Author  [facility]

 

 Organization  [facility]

 

 Address  Unknown

 

 Phone  Unavailable







Care Team Providers







 Care Team Member Name  Role  Phone

 

 CHRISTIAN UGARTE  Unavailable  Unavailable







Problems

This patient has no known problems.



Allergies, Adverse Reactions, Alerts

This patient has no known allergies or adverse reactions.



Medications

This patient has no known medications.



Results







 Test Description  Test Time  Test Comments  Text Results  Atomic Results  
Result Comments









 CT ABDOMEN/PELVIS W            [place] [address]      Patient Name: JOAN RUIZ   
MR #: [id number]    : 1955 Age/Sex: 62/F  Acct #: [id number] Req #: 
[id number]  Adm Physician:     Ordered by: CHRISTIAN UGARTE MD  Report #: [id number]
[id number]   Location: ER  Room/Bed:     ____________________________________________
_______________________________________________________    Procedure: [id number] 
CT/CT ABDOMEN/PELVIS W  Exam Date: 18                            Exam Time
:        REPORT STATUS: Signed    EXAM: CT ABDOMEN/PELVIS W   DATE: 2018 6:05 PM     INDICATION:  Abdominal pain   COMPARISON:  2016   
TECHNIQUE: The abdomen and pelvis were scanned using a multidetector helical   
scanner. Coronal and sagittal reformations were obtained. Routine protocol   
performed.   IV Contrast: 100 ml Isovue 370      FINDINGS:   LOWER THORAX: Mild 
bibasilar atelectasis.      LIVER/BILIARY: Stable low-density right liver lesion
, likely benign. Unchanged   mild biliary ductal dilation.      GALLBLADDER: 
Cholecystectomy.   SPLEEN: Unremarkable   PANCREAS: Unremarkable      ADRENALS: 
No nodules   KIDNEYS: Symmetric perfusion. Stable subcentimeter probable left 
inferior renal   cyst. No hydronephrosis.      GI TRACT: Postsurgical changes 
are seen status post gastric bypass and prior   sigmoidectomy. No evidence of 
obstruction or wall thickening. Normal appendix.      VESSELS: Mild to moderate 
atherosclerotic changes.   PERITONEUM/RETROPERITONEUM: No free air or fluid   
LYMPH NODES: No lymphadenopathy      REPRODUCTIVE ORGANS/BLADDER: Hysterectomy. 
Bladder is mildly distended but   otherwise unremarkable.      SOFT TISSUES: 
Unremarkable   BONES: There are scattered degenerative changes, worse at L5-S1.
      IMPRESSION:   No acute abnormality.          Signed by: Dr Daysi Kennedy MD on 2018 8:43 PM        Dictated By: DAYSI KENNEDY MD  Electronically 
Signed By: DAYSI KENNEDY MD on 18  Transcribed By: KACEY on        COPY TO:   CHRISTIAN UGARTE MD

## 2018-03-29 NOTE — XMS REPORT
Clinical Summary

 Created on: 2018



Joan Funez

External Reference #: [id number]

: 1955

Sex: Female



Demographics







 Address  [address] [facility]

[place]  [address]

 

 Home Phone  [phone]

 

 Preferred Language  English

 

 Marital Status  

 

 Moravian Affiliation  Unknown

 

 Race  White

 

 Ethnic Group  /Latin





Author







 Author  Juan Christianity

 

 Organization  [facility]

 

 Address  Unknown

 

 Phone  Unavailable







Support







 Name  Relationship  Address  Phone

 

 Humza Patrick  Unknown  [phone]







Care Team Providers







 Care Team Member Name  Role  Phone

 

 Asked, Pcp  PCP  Unavailable







Allergies







    



  Active Allergy   Reactions   Severity   Noted Date   Comments

 

    



  Morphine   Hives    2018   Blisters







Current Medications







      



  Prescription   Sig.   Disp.   Refills   Start   End Date   Status



      Date  

 

      



  clonAZEPAM (KlonoPIN) 1   Take 1 mg by mouth every       Active



  MG tablet   8 (eight) hours as needed     



   for anxiety.     

 

      



  clonAZEPAM (KlonoPIN) 2   Take 2 mg by mouth every       Active



  MG tablet   8 (eight) hours as needed     



   for anxiety.     

 

      



  diclofenac (VOLTAREN) 1 %   Apply 1 application       Active



  gel   topically every 6 (six)     



   hours as needed (pain).     

 

      



  HYDROcodone-acetaminophen   Take 1 tablet by mouth       Active



  (NORCO)  mg per   every 8 (eight) hours as     



  tablet   needed for moderate pain.     

 

      



  promethazine-codeine   Take 5 mL by mouth every       Active



  (PHENERGAN with CODEINE)   6 (six) hours as needed     



  6.25-10 mg/5 mL syrup   for cough.     

 

      



  zolpidem (AMBIEN) 10 mg   Take 10 mg by mouth       Active



  tablet   nightly as needed for     



   sleep.     

 

      



  cholecalciferol, vitamin   Take 1,000 Units by mouth       Active



  D3, (VITAMIN D3) 1,000   daily.     



  unit tablet      

 

      



  ascorbic acid, vitamin C,   Take 500 mg by mouth       Active



  (VITAMIN C) 500 MG tablet   daily.     

 

      



  TURMERIC ROOT EXTRACT   Take 1 tablet by mouth       Active



  ORAL   daily.     

 

      



  amoxicillin-pot   Take 1 tablet by mouth 2   20 tablet   0   20   



  clavulanate (AUGMENTIN)   (two) times a day for 10     18   18 



  875-125 mg per tablet   days.     

 

      



  methylPREDNISolone   follow package directions   21 tablet   0   20   



  (MEDROL, JUSTINE,) 4 mg      18   18 



  tablet      







Active Problems







 



  Problem   Noted Date

 

 



  Pneumonia of right middle lobe due to infectious organism   2018

 

 



  Atypical pneumonia   2018







Encounters







    



  Date   Type   Specialty   Care Team   Description

 

    



  2018   [facility]   General Internal Medicine   Maryann Mendez MD   
Pneumonia of right middle



  -   Encounter    Sadia Garcia MD   lobe due to infectious



  2018     Jean Baird   organism (Primary Dx)



     MD Shweta 



after 2017



Social History







    



  Tobacco Use   Types   Packs/Day   Years Used   Date

 

    



  Never Smoker    

 

    



  Smokeless Tobacco: Never   



  Used   









   



  Alcohol Use   Drinks/Week   oz/Week   Comments

 

   



  No   









 



  Sex Assigned at Birth   Date Recorded

 

 



  Not on file 







Last Filed Vital Signs







  



  Vital Sign   Reading   Time Taken

 

  



  Blood Pressure   135/81   2018  4:09 PM CST

 

  



  Pulse   97   2018  4:13 PM CST

 

  



  Temperature   35.9   C (96.7   F)   2018  4:09 PM CST

 

  



  Respiratory Rate   16   2018  4:09 PM CST

 

  



  Oxygen Saturation   96%   2018  4:13 PM CST

 

  



  Inhaled Oxygen   -   -



  Concentration  

 

  



  Weight   83.9 kg (185 lb)   2018  7:00 AM CST

 

  



  Height   162.6 cm (5' 4")   2018  7:34 PM CST

 

  



  Body Mass Index   31.75   2018  7:00 AM CST







Plan of Treatment







   



  Health Maintenance   Due Date   Last Done   Comments

 

   



  PAP SMEAR   1974  

 

   



  COLONOSCOPY   2003  

 

   



  MAMMOGRAM   2003  

 

   



  ZOSTER VACCINE   2013  

 

   



  INFLUENZA VACCINE   2017  







Results

* ECG ED Preliminary Interpretation - NOT AN ORDER (2018  7:39 AM)





 Narrative

 

 



Keshav Funeza 3/6/79864:40 AM



ECG ED Preliminary Interpretation - Not an Order



Performed by: MARYANN MENDEZ



Authorized by: MARYANN MENDEZ 



 



ECG reviewed by ED Physician in the absence of a cardiologist: yes



Previous ECG: 



Previous ECG:Unavailable



Interpretation: 



Interpretation: normal



Rate: 



ECG rate:105



ECG rate assessment: tachycardic



Rhythm: 



Rhythm: sinus tachycardia



Ectopy: 



Ectopy: none



QRS: 



QRS axis:Normal



QRS intervals:Normal



Conduction: 



Conduction: normal



ST segments: 



ST segments:Normal



T waves: 



T waves: normal





* Lactic acid level, SEPSIS - Now and repeat 2x every 3 hours (2018  3:13 
AM)



Only the most recent of 3 results within the time period is included.





  



  Component   Value   Ref Range

 

  



  Lactic acid   1.2   0.5 - 2.2 mmol/L









 



  Specimen   Performing Laboratory

 

 



  Plasma specimen   [facility] DEPARTMENT OF PATHOLOGY AND [address] [address]





* ALT (SGPT) (2018  3:13 AM)





  



  Component   Value   Ref Range

 

  



  ALT   <5 (A)   5 - 50 U/L









 



  Specimen   Performing Laboratory

 

 



  Plasma specimen   [facility] DEPARTMENT OF PATHOLOGY AND [address] [address]





* AST (SGOT) (2018  3:13 AM)





  



  Component   Value   Ref Range

 

  



  AST   17   10 - 35 U/L









 



  Specimen   Performing Laboratory

 

 



  Plasma specimen   [facility] DEPARTMENT OF PATHOLOGY AND [address] [address]





* Potassium level (2018  3:13 AM)





  



  Component   Value   Ref Range

 

  



  Potassium   3.6   3.5 - 5.0 mEq/L









 



  Specimen   Performing Laboratory

 

 



  Plasma specimen   [facility] DEPARTMENT OF PATHOLOGY [address] [address]





* CT Chest Wo Contrast (2018  2:35 AM)





 



  Specimen   Performing Laboratory

 

 



    [facility]



   [address] [place] [address]









 Narrative

 

 



CT CHEST WO CONTRAST



 



CLINICAL INDICATION: cough



 



TECHNIQUE:Multidetector CT imaging of the chest was performed without 
intravenous contrast with multiplanar reconstructions. 



 



CT scans are performed using radiation dose reduction techniques (iterative 
reconstruction and/or automated exposure control). Technical factors are 
evaluated and adjusted to ensure appropriate moderation of exposure. Automated 
dose management technology



 is applied to adjust radiation exposure while achieving a diagnostic quality 
image.



 



COMPARISON:Chest radiograph 3/5/2018.



 



FINDINGS:



 



Lungs and large airways: Scattered bilateral central peribronchovascular 
groundglass opacities with basilar predominance.



 



Pleura:No pleural effusion, pleural thickening, or pneumothorax.



 



Heart and pericardium:Heart size is normal. Trace pericardial effusion.







Vessels:Mild calcific atherosclerosis of the thoracic aorta. Main pulmonary 
artery measures 2.7 cm in diameter. Coronary atherosclerosis. Evaluation of 
vessel lumens is limited due to lack of IV contrast.



 



Mediastinum and danny:No mass or hematoma.



 



Lymph nodes:No pathological adenopathy in the danny, axilla or mediastinum.



 



Chest wall:Unremarkable. 



 



Bones:Diffuse osteopenia. Mild degenerative changes. 



 



Upper abdomen: Status post cholecystectomy. Postsurgical changes of the stomach.



 



IMPRESSION:



 



Scattered bilateral groundglass opacities, likely atypical pneumonia. 



 



 



[facility]-[id number]



 









 Procedure Note

 

 



Hm Interface, Radiology Results Incoming - 2018  2:49 AM CST



CT CHEST WO CONTRAST



CLINICAL INDICATION: cough



TECHNIQUE:  Multidetector CT imaging of the chest was performed without 
intravenous contrast with multiplanar reconstructions. 



CT scans are performed using radiation dose reduction techniques (iterative 
reconstruction and/or automated exposure control). Technical factors are 
evaluated and adjusted to ensure appropriate moderation of exposure. Automated 
dose management technology

 is applied to adjust radiation exposure while achieving a diagnostic quality 
image.



COMPARISON:  Chest radiograph 3/5/2018.

 

FINDINGS:



Lungs and large airways: Scattered bilateral central peribronchovascular 
groundglass opacities with basilar predominance.



Pleura:  No pleural effusion, pleural thickening, or pneumothorax.



Heart and pericardium:  Heart size is normal. Trace pericardial effusion.

  

Vessels:  Mild calcific atherosclerosis of the thoracic aorta. Main pulmonary 
artery measures 2.7 cm in diameter. Coronary atherosclerosis. Evaluation of 
vessel lumens is limited due to lack of IV contrast.



Mediastinum and danny:  No mass or hematoma.



Lymph nodes:  No pathological adenopathy in the danny, axilla or mediastinum.



Chest wall:  Unremarkable. 



Bones:  Diffuse osteopenia. Mild degenerative changes. 



Upper abdomen: Status post cholecystectomy. Postsurgical changes of the stomach.



IMPRESSION:



Scattered bilateral groundglass opacities, likely atypical pneumonia. 





[facility]-[id number]







* Blood culture, aerobic & anaerobic (2018  1:00 AM)



Only the most recent of 2 results within the time period is included.





  



  Component   Value   Ref Range

 

  



  Blood culture isolate   No growth after 5 days of incubation. 



   Comment: 



   Specimen Information 



   Specimen Source: Blood 



   Specimen Site: Antecubital, right 









 



  Specimen   Performing Laboratory

 

 



  Blood - Antecubital,   [facility] DEPARTMENT OF PATHOLOGY AND GENOMIC MEDICINE



  right   [address] [place] [address]





* ECG 12 lead (2018 11:04 PM)





  



  Component   Value   Ref Range

 

  



  Ventricular rate   105 

 

  



  Atrial rate   105 

 

  



  WY interval   156 

 

  



  QRSD interval   82 

 

  



  QT interval   348 

 

  



  QTC interval   459 

 

  



  P axis 1   10 

 

  



  QRS axis 1   -24 

 

  



  T wave axis   17 

 

  



  EKG impression   Sinus tachycardia-Poor R wave 



   progression-Electronically Signed By Jose Juan Mueller MD ([id number]) on 3/6/2018 12:44:57 PM 









 



  Specimen   Performing Laboratory

 

 



   [facility]



   [address] [place] [address]





* Estimated GFR (2018 10:43 PM)





  



  Component   Value   Ref Range

 

  



  GFR Non Af Amer   >90   mL/min/1.73 m2

 

  



  GFR Af Amer   >90   mL/min/1.73 m2



   Comment: 



   Chronic kidney disease: <60 mL/min/1.73m2 



   Kidney failure: <15 mL/min/1.73m2 



   The estimated GFR is calculated from the 



   IDMS-traceable Modification of Diet 



   in Renal Disease Equation. The accuracy of the 



   calculation is poor when the 



   creatinine is normal. Calculated values >90 



   mL/min/1.73m2 are not reported. 



   This equation has not been validated in children 



   (<18 years), pregnant 



   women, the elderly (>70 years), or ethnic groups 



   other than Caucasians and 



    Americans. 









 



  Specimen   Performing Laboratory

 

 



  Plasma specimen   [facility] DEPARTMENT OF PATHOLOGY AND GENOMIC MEDICINE



   [address] [place] [address]





* CBC with platelet and differential (2018 10:43 PM)





  



  Component   Value   Ref Range

 

  



  WBC   11.49 (H)   4.50 - 11.00 k/uL

 

  



  RBC   4.02 (L)   4.20 - 5.50 m/uL

 

  



  HGB   10.7 (L)   12.0 - 16.0 g/dL

 

  



  HCT   34.4 (L)   37.0 - 47.0 %

 

  



  MCV   85.6   82.0 - 100.0 fL

 

  



  MCH   26.6 (L)   27.0 - 34.0 pg

 

  



  MCHC   31.1   31.0 - 37.0 g/dL

 

  



  RDW - SD   47.8   37.0 - 55.0 fL

 

  



  MPV   10.2   8.8 - 13.2 fL

 

  



  Platelet count   250   150 - 400 k/uL

 

  



  Nucleated RBC   0.00   /100 WBC

 

  



  Neutrophils   80.1 (H)   39.0 - 69.0 %

 

  



  Lymphocytes   12.8 (L)   25.0 - 45.0 %

 

  



  Monocytes   5.3   0.0 - 10.0 %

 

  



  Eosinophils   0.7   0.0 - 5.0 %

 

  



  Basophils   0.2   0.0 - 1.0 %

 

  



  Immature granulocytes   0.9Comment: "Immature granulocytes"   0.0 - 1.0 %



   (promyelocytes, myelocytes, metamyelocytes) 









 



  Specimen   Performing Laboratory

 

 



  Blood   [facility] DEPARTMENT OF PATHOLOGY AND GENOMIC MEDICINE



   [address] [place] [address]





* Comprehensive metabolic panel (2018 10:43 PM)





  



  Component   Value   Ref Range

 

  



  Sodium   137   135 - 148 mEq/L

 

  



  Potassium   SEE COMMENT   3.5 - 5.0 mEq/L



   Comment: 



   Footnote--------- 



   Sample is hemolyzed. 

 

  



  Chloride   99   98 - 112 mEq/L

 

  



  CO2   25   24 - 31 mEq/L

 

  



  Anion gap   13   7 - 15 mEq/L



   Comment: 



   Starting from  , anion gap 



   calculation 



   no longer incorporates potassium. Please note the 



   change. 

 

  



  BUN   8   8 - 23 mg/dL

 

  



  Creatinine   0.5   0.5 - 0.9 mg/dL

 

  



  Glucose   99   65 - 99 mg/dL

 

  



  Calcium   8.7 (L)   8.8 - 10.2 mg/dL

 

  



  Protein   7.7   6.3 - 8.3 g/dL



   Comment: 



   [place]                             4.6-7.0 g/dL 



   1 week                             4.4-7.6 g/dL 



   7 months-1year                    5.1-7.3 g/dL 



   1-2 years                    5.6-7.5 g/dL 



   >3 years                    6.0-8.0 g/dL 



                                6.3-8.3 g/dL 

 

  



  Albumin   3.1 (L)   3.5 - 5.0 g/dL

 

  



  A/G ratio   0.7   0.7 - 3.8

 

  



  Alkaline phosphatase   SEE COMMENTComment: Footnote---------   35 - 104 U/L

 

  



  AST   SEE COMMENTComment: Footnote---------   10 - 35 U/L

 

  



  ALT   SEE COMMENTComment: Footnote---------   5 - 50 U/L

 

  



  Total bilirubin   0.6   0.0 - 1.2 mg/dL









 



  Specimen   Performing Laboratory

 

 



  Plasma specimen   [facility] DEPARTMENT OF PATHOLOGY AND GENOMIC MEDICINE



   [address] [place], [place] [id number]





* XR Chest 2 Vw (2018  9:20 PM)





 



  Specimen   Performing Laboratory

 

 



   [facility]



   [address] [place], [place] [address]









 Narrative

 

 



Study:XR CHEST 2 VW



 



History: fever



 



COMPARISON: None.



 



IMPRESSION:



 



2 views of the chest.There is a streaky opacity in the retrocardiac 
region which could represent some scarring or infiltrate. There is some mild 
reticulation in the lower zones of both lungs could be related to scarring. No 
lobar consolidation or 



pleural effusion. No pneumothorax.Cardiac silhouette is normal.
Visualized osseous structures arewithout acute abnormality.



 



[facility]-[id number]



 









 Procedure Note

 

 



 Interface, Radiology Results Incoming - 2018  9:24 PM CST



Study:XR CHEST 2 VW



History: fever



COMPARISON: None.



IMPRESSION:



 2 views of the chest.      There is a streaky opacity in the retrocardiac 
region which could represent some scarring or infiltrate. There is some mild 
reticulation in the lower zones of both lungs could be related to scarring. No 
lobar consolidation or 

pleural effusion. No pneumothorax.      Cardiac silhouette is normal.  
Visualized osseous structures are  without acute abnormality.



[facility]-[id number]







after 2017



Insurance







     



  Payer   Benefit   Subscriber ID   Type   Phone   Address



   Plan /    



   Group    

 

     



  [employer]   [employer]   xxxxxxxxx   O  



   [employer]    



   [facility]    



   ADV    









     



  Guarantor Name   Account   Relation to   Date of   Phone   Billing Address



   Type   Patient   Birth  

 

     



  JOAN FUNEZ   Personal/F   Self   1955   Home:   [address] [place] [address] [address]



   amily     [phone]   [address] [place] [id number]

## 2018-05-28 ENCOUNTER — HOSPITAL ENCOUNTER (EMERGENCY)
Dept: HOSPITAL 88 - ER | Age: 63
Discharge: HOME | End: 2018-05-28
Payer: MEDICARE

## 2018-05-28 VITALS — WEIGHT: 170 LBS | BODY MASS INDEX: 29.02 KG/M2 | HEIGHT: 64 IN

## 2018-05-28 VITALS — DIASTOLIC BLOOD PRESSURE: 77 MMHG | SYSTOLIC BLOOD PRESSURE: 153 MMHG

## 2018-05-28 DIAGNOSIS — I10: ICD-10-CM

## 2018-05-28 DIAGNOSIS — M19.90: ICD-10-CM

## 2018-05-28 DIAGNOSIS — N30.90: ICD-10-CM

## 2018-05-28 DIAGNOSIS — K29.00: Primary | ICD-10-CM

## 2018-05-28 LAB
ALBUMIN SERPL-MCNC: 3.8 G/DL (ref 3.5–5)
ALBUMIN/GLOB SERPL: 1 {RATIO} (ref 0.8–2)
ALP SERPL-CCNC: 90 IU/L (ref 40–150)
ALT SERPL-CCNC: 13 IU/L (ref 0–55)
AMYLASE SERPL-CCNC: 68 U/L (ref 25–125)
ANION GAP SERPL CALC-SCNC: 14.7 MMOL/L (ref 8–16)
BACTERIA URNS QL MICRO: (no result) /HPF
BASOPHILS # BLD AUTO: 0 10*3/UL (ref 0–0.1)
BASOPHILS NFR BLD AUTO: 0.5 % (ref 0–1)
BILIRUB UR QL: NEGATIVE
BUN SERPL-MCNC: 10 MG/DL (ref 7–26)
BUN/CREAT SERPL: 14 (ref 6–25)
CALCIUM SERPL-MCNC: 9 MG/DL (ref 8.4–10.2)
CHLORIDE SERPL-SCNC: 106 MMOL/L (ref 98–107)
CK MB SERPL-MCNC: 0.7 NG/ML (ref 0–5)
CK SERPL-CCNC: 67 IU/L (ref 29–168)
CLARITY UR: CLEAR
CO2 SERPL-SCNC: 21 MMOL/L (ref 22–29)
COLOR UR: YELLOW
DEPRECATED NEUTROPHILS # BLD AUTO: 2.1 10*3/UL (ref 2.1–6.9)
DEPRECATED RBC URNS MANUAL-ACNC: (no result) /HPF (ref 0–5)
EGFRCR SERPLBLD CKD-EPI 2021: > 60 ML/MIN (ref 60–?)
EOSINOPHIL # BLD AUTO: 0.1 10*3/UL (ref 0–0.4)
EOSINOPHIL NFR BLD AUTO: 2 % (ref 0–6)
EPI CELLS URNS QL MICRO: (no result) /LPF
ERYTHROCYTE [DISTWIDTH] IN CORD BLOOD: 13.4 % (ref 11.7–14.4)
GLOBULIN PLAS-MCNC: 3.7 G/DL (ref 2.3–3.5)
GLUCOSE SERPLBLD-MCNC: 94 MG/DL (ref 74–118)
HCT VFR BLD AUTO: 35.2 % (ref 34.2–44.1)
HGB BLD-MCNC: 10.9 G/DL (ref 12–16)
KETONES UR QL STRIP.AUTO: NEGATIVE
LEUKOCYTE ESTERASE UR QL STRIP.AUTO: (no result)
LIPASE SERPL-CCNC: 37 U/L (ref 8–78)
LYMPHOCYTES # BLD: 1.4 10*3/UL (ref 1–3.2)
LYMPHOCYTES NFR BLD AUTO: 33.9 % (ref 18–39.1)
MCH RBC QN AUTO: 26.5 PG (ref 28–32)
MCHC RBC AUTO-ENTMCNC: 31 G/DL (ref 31–35)
MCV RBC AUTO: 85.6 FL (ref 81–99)
MONOCYTES # BLD AUTO: 0.5 10*3/UL (ref 0.2–0.8)
MONOCYTES NFR BLD AUTO: 11.3 % (ref 4.4–11.3)
NEUTS SEG NFR BLD AUTO: 52.1 % (ref 38.7–80)
NITRITE UR QL STRIP.AUTO: NEGATIVE
NON-SQ EPI CELLS URNS QL MICRO: (no result)
PLATELET # BLD AUTO: 187 X10E3/UL (ref 140–360)
POTASSIUM SERPL-SCNC: 4.7 MMOL/L (ref 3.5–5.1)
PROT UR QL STRIP.AUTO: NEGATIVE
RBC # BLD AUTO: 4.11 X10E6/UL (ref 3.6–5.1)
SODIUM SERPL-SCNC: 137 MMOL/L (ref 136–145)
SP GR UR STRIP: 1.01 (ref 1.01–1.02)
UROBILINOGEN UR STRIP-MCNC: 1 MG/DL (ref 0.2–1)

## 2018-05-28 PROCEDURE — 99283 EMERGENCY DEPT VISIT LOW MDM: CPT

## 2018-05-28 NOTE — DIAGNOSTIC IMAGING REPORT
EXAM: CT ABDOMEN AND PELVIS without IV CONTRAST

INDICATION:  Abdominal pain, nausea and vomiting

COMPARISON:  CT of the abdomen and pelvis February 18, 2018

TECHNIQUE: The abdomen and pelvis were scanned using a multidetector helical

scanner. Coronal and sagittal reformations were obtained. Routine protocol

performed.

IV Contrast: None

Oral Contrast: Gastrografin

CTDIvol has been reviewed. It is below the limits set by the Radiation Protocol

Committee (RPC).



FINDINGS:

LOWER THORAX: No consolidations



LIVER: No masses

BILIARY: Cholecystectomy. No abnormal ductal dilation. 



SPLEEN: No masses

PANCREAS: No masses



ADRENALS: No nodules



RIGHT KIDNEY: No nephroureterolithiasis or hydronephrosis. 



LEFT KIDNEY: No nephroureterolithiasis or hydronephrosis.



GI TRACT: No wall thickening or obstruction. Surgical changes of gastric

bypass. Surgical sutures around the sigmoid colon. Normal appendix.



VESSELS:  Mild atherosclerotic changes of the abdominal aorta without aneurysm.

PERITONEUM/RETROPERITONEUM: No free air or fluid

LYMPH NODES: No lymphadenopathy



REPRODUCTIVE ORGANS: Not visualized

BLADDER: Normal



SOFT TISSUES: Normal

BONES: No suspicious bone lesions.



IMPRESSION:

No acute findings.

No bowel obstruction.



Signed by: Dr. Zuleika Nugent M.D. on 5/28/2018 5:20 AM

## 2019-02-09 ENCOUNTER — HOSPITAL ENCOUNTER (EMERGENCY)
Dept: HOSPITAL 88 - ER | Age: 64
Discharge: HOME | End: 2019-02-09
Payer: MEDICARE

## 2019-02-09 VITALS — WEIGHT: 170 LBS | BODY MASS INDEX: 29.02 KG/M2 | HEIGHT: 64 IN

## 2019-02-09 DIAGNOSIS — S60.212A: ICD-10-CM
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
## 2019-02-09 DIAGNOSIS — Z98.84: ICD-10-CM

## 2019-02-09 DIAGNOSIS — S00.83XA: Primary | ICD-10-CM

## 2019-02-09 DIAGNOSIS — I10: ICD-10-CM

## 2019-02-09 DIAGNOSIS — S70.02XA: ICD-10-CM

## 2019-02-09 DIAGNOSIS — Z98.0: ICD-10-CM
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
## 2019-02-09 PROCEDURE — 72125 CT NECK SPINE W/O DYE: CPT

## 2019-02-09 PROCEDURE — 99284 EMERGENCY DEPT VISIT MOD MDM: CPT

## 2019-02-09 PROCEDURE — 70450 CT HEAD/BRAIN W/O DYE: CPT

## 2019-02-09 NOTE — DIAGNOSTIC IMAGING REPORT
Exam: Head and cervical spine CTs without contrast

Indication: Fall presumably from standing, hit head and passed out

Comparisons: Head and cervical spine CTs 5/30/2016. 



Technique:

Axial images were obtained from the brain and cervical spine.

Coronal and sagittal images reconstructed from the axial data.

Dose modulation, iterative reconstruction, and/or weight based adjustment 

of the mA/kV was utilized to reduce the radiation dose to as low as reasonably 

achievable.



Intravenous contrast: None



Findings:



Head CT:



Scalp/skull: 

No abnormalities. No fractures, blastic or lytic lesions.



Brain sulci: Appropriate for age.

Ventricles: Normal in size and configuration. No hydrocephalus.



Extra-axial spaces: 

No masses.  No fluid collections.



Parenchyma: 

Few hypodensities of the periventricular and deep white matter, non specific

and most commonly seen in mild chronic microvascular ischemic changes. 

No masses, hemorrhage, acute or chronic cortical vascular insults.



Sellar/suprasellar region: No abnormalities.

Craniocervical junction: Patent foramen magnum.  No Chiari one malformation.



Cervical spine CT:



Airway: Patent.



Fractures: None.

Soft tissues: No gross abnormalities.



Atlantoaxial articulation: Intact.

Alignment: Normal lordosis. No scoliosis.

Cervicomedullary junction: No abnormalities. Patent foramen magnum.



Vertebrae: 

No infection or neoplasm.



Degenerative changes: 

Moderate right foraminal stenosis at C5-C6.

Mild to moderate canal stenosis at C6-C7.

Otherwise, no significant degenerative changes.



IMPRESSION:



Head CT:

1.  No acute abnormality.

2.  No changes when compared to head CT dated 5/30/2016.



Cervical spine CT:

1.  No acute  abnormalities.

2.  Cannot adequately evaluate for ligament, spinal cord and or vascular

abnormalities.

3.  Bilateral predominant cervical spondylosis from C5 to C7.



A preliminary report was provided by Dr. Silva on 2/9/2019 2:31 PM.



Signed by: DR Elie Villa M.D. on 2/9/2019 3:34 PM

## 2019-02-09 NOTE — DIAGNOSTIC IMAGING REPORT
Exam: Left wrist radiographs-3 views



Indication: Fall with pain in the left wrist.



Comparison: None.



Findings:  

Diffuse osteopenia. No evidence of acute fracture, malalignment, or soft tissue

abnormality. Moderate degenerative changes at the first carpometacarpal joint

with joint space narrowing..



Impression:

No acute radiographic abnormality.



Diffuse osteopenia.



Signed by: Dr. Kelli Che MD on 2/9/2019 2:38 PM

## 2019-02-09 NOTE — XMS REPORT
Clinical Summary

                             Created on: 2019



Renetta Funez

External Reference #: [id number]

: 1955

Sex: Female



Demographics







                          Address                   [address] [facility]

[place]  [address]

 

                          Home Phone                [phone]

 

                          Preferred Language        English

 

                          Marital Status            

 

                          [place] Affiliation     Unknown

 

                          Race                      White

 

                          Ethnic Group              /Latin





Author







                          Author                    Juan Faith

 

                          Organization              [facility]

 

                          Address                   Unknown

 

                          Phone                     Unavailable







Support







                Name            Relationship    Address         Phone

 

                Humza Patrick            Unknown         [phone]







Care Team Providers







                    Care Team Member Name    Role                Phone

 

                    Asked, No Pcp       PCP                 Unavailable







Allergies







                                        Comments



                 Active Allergy     Reactions       Severity        Noted Date 

 

                                        



Blisters



                     Morphine            Hives               2018 







Medications







                          End Date                  Status



              Medication     Sig          Dispensed     Refills      Start  



                                         Date  

 

                                                    Active



                     clonAZEPAM (KlonoPIN) 1     Take 1 mg by        0   



                           MG tablet                 mouth every 8     



                                         (eight) hours     



                                         as needed for     



                                         anxiety.     

 

                                                    Active



                     clonAZEPAM (KlonoPIN) 2     Take 2 mg by        0   



                           MG tablet                 mouth every 8     



                                         (eight) hours     



                                         as needed for     



                                         anxiety.     

 

                                                    Active



                     diclofenac (VOLTAREN) 1 %     Apply 1             0   



                           gel                       application     



                                         topically     



                                         every 6 (six)     



                                         hours as     



                                         needed     



                                         (pain).     

 

                                                    Active



                     HYDROcodone-acetaminophen     Take 1 tablet       0   



                           (NORCO)  mg per     by mouth     



                           tablet                    every 8     



                                         (eight) hours     



                                         as needed for     



                                         moderate     



                                         pain.     

 

                                                    Active



                     promethazine-codeine     Take 5 mL by        0   



                           (PHENERGAN with CODEINE)     mouth every 6     



                           6.25-10 mg/5 mL syrup     (six) hours     



                                         as needed for     



                                         cough.     

 

                                                    Active



                     zolpidem (AMBIEN) 10 mg     Take 10 mg by       0   



                           tablet                    mouth nightly     



                                         as needed for     



                                         sleep.     

 

                                                    Active



                     cholecalciferol, vitamin     Take 1,000          0   



                           D3, (VITAMIN D3) 1,000     Units by     



                           unit tablet               mouth daily.     

 

                                                    Active



                     ascorbic acid, vitamin C,     Take 500 mg         0   



                           (VITAMIN C) 500 MG tablet     by mouth     



                                         daily.     

 

                                                    Active



                     TURMERIC ROOT EXTRACT     Take 1 tablet       0   



                           ORAL                      by mouth     



                                         daily.     

 

                          2018                



              amoxicillin-pot     Take 1 tablet     20 tablet     0              



                     clavulanate (AUGMENTIN)     by mouth 2          8  



                           875-125 mg per tablet     (two) times a     



                                         day for 10     



                                         days.     

 

                          2018                



              methylPREDNISolone     follow       21 tablet     0              



                     (MEDROL, JUSTINE,) 4 mg     package             8  



                           tablet                    directions     







Active Problems







 



                           Problem                   Noted Date

 

 



                           Pneumonia of right middle lobe due to infectious organism     2018

 

 



                           Atypical pneumonia        2018







Encounters







                          Care Team                 Description



                     Date                Type                Specialty  

 

                                        



Maryann Mendez MD Joglekar, Swati, MD Patel, Jean Rock MD          Pneumonia of right middle lobe due to infectious 

organism (Primary Dx)



                     2018          [facility]            General Internal Medicine  



                           -                         Encounter   



                                         2018    



after 2018



Social History







                                        Date



                 Tobacco Use     Types           Packs/Day       Years Used 

 

                                         



                                         Never Smoker    

 

    



                                         Smokeless Tobacco: Never   



                                         Used   









   



                 Alcohol Use     Drinks/Week     oz/Week         Comments

 

   



                                         No   









 



                           Sex Assigned at Birth     Date Recorded

 

 



                                         Not on file 









                                        Industry



                           Job Start Date            Occupation 

 

                                        Not on file



                           Not on file               Not on file 









                                        Travel End



                           Travel History            Travel Start 

 





                                         No recent travel history available.







Last Filed Vital Signs







                                        Time Taken



                           Vital Sign                Reading 

 

                                        2018  4:09 PM CST



                           Blood Pressure            135/81 

 

                                        2018  4:13 PM CST



                           Pulse                     97 

 

                                        2018  4:09 PM CST



                           Temperature               35.9 C (96.7 F) 

 

                                        2018  4:09 PM CST



                           Respiratory Rate          16 

 

                                        2018  4:13 PM CST



                           Oxygen Saturation         96% 

 

                                        -



                           Inhaled Oxygen            - 



                                         Concentration  

 

                                        2018  7:00 AM CST



                           Weight                    83.9 kg (184 lb 16 oz) 

 

                                        2018  7:34 PM CST



                           Height                    162.6 cm (5' 4") 

 

                                        2018  7:00 AM CST



                           Body Mass Index           31.75 







Plan of Treatment







   



                 Health Maintenance     Due Date        Last Done       Comments

 

   



                           CERVICAL CANCER SCREENING     1974  

 

   



                           BREAST CANCER SCREENING     2003  

 

   



                           COLON CANCER SCREENING     2003  

 

   



                           SHINGLES VACCINES (1 of     2003  



                                         2)   

 

   



                           PNEUMOCOCCAL              2018  



                                         POLYSACCHARIDE VACCINE   



                                         AGE 65 AND OVER   

 

   



                           PNEUMOCOCCAL-13           2018  

 

   



                           INFLUENZA VACCINE         2018  







Procedures







                                        Comments



                 Procedure Name     Priority        Date/Time       Associated Diagnosis 

 

                                        



Results for this procedure are in the results section.



                     ECG ED PRELIMINARY     Routine             2018  



                           INTERPRETATION            7:39 AM CST  

 

                                        



Results for this procedure are in the results section.



                     LACTIC ACID LEVEL, SEPSIS     STAT                2018  



                           - NOW AND REPEAT 2X EVERY      3:13 AM CST  



                                         3 HOURS    

 

                                        



Results for this procedure are in the results section.



                     ALT (SGPT)          STAT                2018  



                                         3:13 AM CST  

 

                                        



Results for this procedure are in the results section.



                     AST (SGOT)          STAT                2018  



                                         3:13 AM CST  

 

                                        



Results for this procedure are in the results section.



                     POTASSIUM LEVEL     STAT                2018  



                                         3:13 AM CST  

 

                                        



Results for this procedure are in the results section.



                     CT CHEST WO CONTRAST     STAT                2018  



                                         2:35 AM CST  

 

                                        



Results for this procedure are in the results section.



                     LACTIC ACID LEVEL, SEPSIS     Timed               2018  



                           - NOW AND REPEAT 2X EVERY      1:37 AM CST  



                                         3 HOURS    

 

                                        



Results for this procedure are in the results section.



                     BLOOD CULTURE, AEROBIC &     Routine             2018  



                           ANAEROBIC                 1:00 AM CST  

 

                                        



Results for this procedure are in the results section.



                     ECG 12-LEAD         Routine             2018  



                                         11:04 PM CST  

 

                                        



Results for this procedure are in the results section.



                     ZZESTIMATED GFR     STAT                2018  



                                         10:43 PM CST  

 

                                        



Results for this procedure are in the results section.



                     LACTIC ACID LEVEL, SEPSIS     STAT                2018  



                           - NOW AND REPEAT 2X EVERY      10:43 PM CST  



                                         3 HOURS    

 

                                        



Results for this procedure are in the results section.



                     HC COMPLETE BLD COUNT     STAT                2018  



                           W/AUTO DIFF               10:43 PM CST  

 

                                        



Results for this procedure are in the results section.



                     COMPREHENSIVE METABOLIC     STAT                2018  



                           PANEL                     10:43 PM CST  

 

                                        



Results for this procedure are in the results section.



                     BLOOD CULTURE, AEROBIC &     Routine             2018  



                           ANAEROBIC                 9:43 PM CST  

 

                                        



Results for this procedure are in the results section.



                     XR CHEST 2 VW       STAT                2018  



                                         9:20 PM CST  



after 2018



Results

* ECG ED Preliminary Interpretation - NOT AN ORDER (2018  7:39 AM CST)





 



                           Narrative                 Performed At

 

 



                                         [facility] 3/6/27240:40 AM 



                                         ECG ED Preliminary Interpretation - Not an Order 



                                         Performed by: MARYANN MENDEZ 



                                         Authorized by: MARYANN MENDEZ 



                                         ECG reviewed by ED Physician in the absence of a cardiologist: yes 



                                         Previous ECG: 



                                         Previous ECG:Unavailable 



                                         Interpretation: 



                                         Interpretation: normal 



                                         Rate: 



                                         ECG rate:105 



                                         ECG rate assessment: tachycardic 



                                         Rhythm: 



                                         Rhythm: sinus tachycardia 



                                         Ectopy: 



                                         Ectopy: none 



                                         QRS: 



                                         QRS axis:Normal 



                                         QRS intervals:Normal 



                                         Conduction: 



                                         Conduction: normal 



                                         ST segments: 



                                         ST segments:Normal 



                                         T waves: 



                                         T waves: normal 





* Lactic acid level, SEPSIS - Now and repeat 2x every 3 hours (2018  3:13 
  AM CST)



Only the most recent of 3 results within the time period is included.





   



                 Component       Value           Ref Range       Performed At

 

   



                 Lactic acid     1.2             0.5 - 2.2 mmol/L     [facility] DEPARTMENT OF



                                         PATHOLOGY AND



                                         GENOMIC MEDICINE













                                         Specimen

 





                                         Plasma specimen









   



                 Performing Organization     Address         City/[facility]     Phone Number

 

   



                     [facility] [place] [address] 



                                         PATHOLOGY AND GENOMIC   



                                         MEDICINE   





* ALT (SGPT) (2018  3:13 AM CST)





   



                 Component       Value           Ref Range       Performed At

 

   



                 ALT             <5 (A)          5 - 50 U/L      [facility] DEPARTMENT 



                                         PATHOLOGY AND



                                         GENOMIC MEDICINE













                                         Specimen

 





                                         Plasma specimen









   



                 Performing Organization     Address         City/[facility]     Phone Number

 

   



                     [facility] [place] [address] 



                                         PATHOLOGY AND GENOMIC   



                                         MEDICINE   





* AST (SGOT) (2018  3:13 AM CST)





   



                 Component       Value           Ref Range       Performed At

 

   



                 AST             17              10 - 35 U/L     [facility] DEPARTMENT 



                                         PATHOLOGY AND



                                         GENOMIC MEDICINE













                                         Specimen

 





                                         Plasma specimen









   



                 Performing Organization     Address         City/State/[facility]     Phone Number

 

   



                     [place] [address] 



                                         PATHOLOGY AND GENOMIC   



                                         MEDICINE   





* Potassium level (2018  3:13 AM CST)





   



                 Component       Value           Ref Range       Performed At

 

   



                 Potassium       3.6             3.5 - 5.0 mEq/L     [facility] DEPARTMENT OF



                                         PATHOLOGY AND



                                         GENOMIC MEDICINE













                                         Specimen

 





                                         Plasma specimen









   



                 Performing Organization     Address         City/State/[facility]     Phone Number

 

   



                     [facility] [place] [address] 



                                         PATHOLOGY AND GENOMIC   



                                         MEDICINE   





* CT Chest Wo Contrast (2018  2:35 AM CST)





 



                           Narrative                 Performed At

 

 



                           CT CHEST WO CONTRAST       [facility]



                                         CLINICAL INDICATION: cough 



                                         TECHNIQUE:Multidetector CT imaging of the chest was performed without 



                                         intravenous contrast with multiplanar reconstructions. 



                                         CT scans are performed using radiation dose reduction techniques (iterative 



                                         reconstruction and/or automated exposure control). Technical factors are 



                                         evaluated and adjusted to ensure appropriate moderation of exposure. Automated 





                                         dose management technology 



                                         is applied to adjust radiation exposure while achieving a diagnostic quality 



                                         image. 



                                         COMPARISON:Chest radiograph 3/5/2018. 



                                         FINDINGS: 



                                         Lungs and large airways: Scattered bilateral central peribronchovascular 



                                         groundglass opacities with basilar predominance. 



                                         Pleura:No pleural effusion, pleural thickening, or pneumothorax. 



                                         Heart and pericardium:Heart size is normal. Trace pericardial effusion. 



                                          



                                         Vessels:Mild calcific atherosclerosis of the thoracic aorta. Main pulmonary

 



                                         artery measures 2.7 cm in diameter. Coronary atherosclerosis. Evaluation of 



                                         vessel lumens is limited due to lack of IV contrast. 



                                         Mediastinum and danny:No mass or hematoma. 



                                         Lymph nodes:No pathological adenopathy in the danny, axilla or mediastinum. 





                                         Chest wall:Unremarkable. 



                                         Bones:Diffuse osteopenia. Mild degenerative changes. 



                                         Upper abdomen: Status post cholecystectomy. Postsurgical changes of the stomach.

 



                                         IMPRESSION: 



                                         Scattered bilateral groundglass opacities, likely atypical pneumonia. 



                                         [facility]-[id number] 









                                        Procedure Note

 

                                        



[facility], Radiology Results Incoming - 2018  2:49 AM CST



CT CHEST WO CONTRAST



CLINICAL INDICATION: cough



TECHNIQUE:  Multidetector CT imaging of the chest was performed without 
intravenous contrast with multiplanar reconstructions. 



CT scans are performed using radiation dose reduction techniques (iterative 
reconstruction and/or automated exposure control). Technical factors are 
evaluated and adjusted to ensure appropriate moderation of exposure. Automated 
dose management technology

 is applied to adjust radiation exposure while achieving a diagnostic quality 
image.



COMPARISON:  Chest radiograph 3/5/2018.

 

FINDINGS:



Lungs and large airways: Scattered bilateral central peribronchovascular 
groundglass opacities with basilar predominance.



Pleura:  No pleural effusion, pleural thickening, or pneumothorax.



Heart and pericardium:  Heart size is normal. Trace pericardial effusion.

  

Vessels:  Mild calcific atherosclerosis of the thoracic aorta. Main pulmonary 
artery measures 2.7 cm in diameter. Coronary atherosclerosis. Evaluation of 
vessel lumens is limited due to lack of IV contrast.



Mediastinum and danny:  No mass or hematoma.



Lymph nodes:  No pathological adenopathy in the danny, axilla or mediastinum.



Chest wall:  Unremarkable. 



Bones:  Diffuse osteopenia. Mild degenerative changes. 



Upper abdomen: Status post cholecystectomy. Postsurgical changes of the stomach.



IMPRESSION:



Scattered bilateral groundglass opacities, likely atypical pneumonia. 





[facility]-[id number]











   



                 Performing Organization     Address         City/[facility]/Zipcode     Phone Number

 

   



                     [facility]          [address] [place] [address] 





* Blood culture, aerobic & anaerobic (2018  1:00 AM CST)



Only the most recent of 2 results within the time period is included.





   



                 Component       Value           Ref Range       Performed At

 

   



                     Blood culture isolate     No growth after 5 days of      [facility] DEPARTMENT OF



                           incubation.               PATHOLOGY AND



                           Comment:                  GENOMIC MEDICINE



                                         Specimen Information  



                                         Specimen Source: Blood  



                                         Specimen Site: Antecubital,  



                                         right  













                                         Specimen

 





                                         Blood - Antecubital,



                                         right









   



                 Performing Organization     Address         City/[facility]/Zipcode     Phone Number

 

   



                     [facility] DEPARTMENT OF     [address] [place] [address] 



                                         PATHOLOGY AND GENOMIC   



                                         MEDICINE   





* ECG 12 lead (2018 11:04 PM CST)





   



                 Component       Value           Ref Range       Performed At

 

   



                     Ventricular rate     105                 [facility]

 

   



                     Atrial rate         105                 [facility]

 

   



                     NV interval         156                 [facility]

 

   



                     QRSD interval       82                  [facility]

 

   



                     QT interval         348                 [facility]

 

   



                     QTC interval        459                 [facility]

 

   



                     P axis 1            10                  [facility]

 

   



                     QRS axis 1          -24                 [facility]

 

   



                     T wave axis         17                  [facility]

 

   



                     EKG impression      Sinus tachycardia-Poor R wave      [facility]



                                         progression-Electronically  



                                         Signed By Jose Juan Mueller MD  



                                         ([id number]) on 3/6/2018 12:44:57 PM  









   



                 Performing Organization     Address         City/[facility]     Phone Number

 

   



                     [facility]            [address] [place] [address] 





* Estimated GFR (2018 10:43 PM CST)





   



                 Component       Value           Ref Range       Performed At

 

   



                 GFR Non Af Amer     >90             mL/min/1.73 m2     [facility] DEPARTMENT OF



                                         PATHOLOGY AND



                                         GENOMIC MEDICINE

 

   



                 GFR Af Amer     >90             mL/min/1.73 m2     [facility] DEPARTMENT OF



                           Comment:                  PATHOLOGY AND



                           Chronic kidney disease: <60      GENOMIC MEDICINE



                                         mL/min/1.73m2  



                                         Kidney failure: <15  



                                         mL/min/1.73m2  



                                         The estimated GFR is  



                                         calculated from the  



                                         IDMS-traceable Modification of  



                                         Diet  



                                         in Renal Disease Equation. The  



                                         accuracy of the calculation is  



                                         poor when the  



                                         creatinine is normal.  



                                         Calculated values >90  



                                         mL/min/1.73m2 are not  



                                         reported.  



                                         This equation has not been  



                                         validated in children (<18  



                                         years), pregnant  



                                         women, the elderly (>70  



                                         years), or ethnic groups other  



                                         than Caucasians and  



                                          Americans.  













                                         Specimen

 





                                         Plasma specimen









   



                 Performing Organization     Address         City/[facility]     Phone Number

 

   



                     [facility]     [address] [place] [address] 



                                         PATHOLOGY AND GENOMIC   



                                         MEDICINE   





* CBC with platelet and differential (2018 10:43 PM CST)





   



                 Component       Value           Ref Range       Performed At

 

   



                 WBC             11.49 (H)       4.50 - 11.00 k/uL     [facility] DEPARTMENT OF



                                         PATHOLOGY AND



                                         GENOMIC MEDICINE

 

   



                 RBC             4.02 (L)        4.20 - 5.50 m/uL     [facility] DEPARTMENT OF



                                         PATHOLOGY AND



                                         GENOMIC MEDICINE

 

   



                 HGB             10.7 (L)        12.0 - 16.0 g/dL     [facility] DEPARTMENT OF



                                         PATHOLOGY AND



                                         GENOMIC MEDICINE

 

   



                 HCT             34.4 (L)        37.0 - 47.0 %     [facility] DEPARTMENT OF



                                         PATHOLOGY AND



                                         GENOMIC MEDICINE

 

   



                 MCV             85.6            82.0 - 100.0 fL     [facility] DEPARTMENT OF



                                         PATHOLOGY AND



                                         GENOMIC MEDICINE

 

   



                 MCH             26.6 (L)        27.0 - 34.0 pg     [facility] DEPARTMENT OF



                                         PATHOLOGY AND



                                         GENOMIC MEDICINE

 

   



                 MCHC            31.1            31.0 - 37.0 g/dL     [facility] DEPARTMENT OF



                                         PATHOLOGY AND



                                         GENOMIC MEDICINE

 

   



                 RDW - SD        47.8            37.0 - 55.0 fL     [facility] DEPARTMENT OF



                                         PATHOLOGY AND



                                         GENOMIC MEDICINE

 

   



                 MPV             10.2            8.8 - 13.2 fL     [facility] DEPARTMENT OF



                                         PATHOLOGY AND



                                         GENOMIC MEDICINE

 

   



                 Platelet count     250             150 - 400 k/uL     [facility] DEPARTMENT OF



                                         PATHOLOGY AND



                                         GENOMIC MEDICINE

 

   



                 Nucleated RBC     0.00            /100 WBC        [facility] DEPARTMENT OF



                                         PATHOLOGY AND



                                         GENOMIC MEDICINE

 

   



                 Neutrophils     80.1 (H)        39.0 - 69.0 %     [facility] DEPARTMENT OF



                                         PATHOLOGY AND



                                         GENOMIC MEDICINE

 

   



                 Lymphocytes     12.8 (L)        25.0 - 45.0 %     [facility] DEPARTMENT OF



                                         PATHOLOGY AND



                                         GENOMIC MEDICINE

 

   



                 Monocytes       5.3             0.0 - 10.0 %     [facility] DEPARTMENT OF



                                         PATHOLOGY AND



                                         GENOMIC MEDICINE

 

   



                 Eosinophils     0.7             0.0 - 5.0 %     [facility] DEPARTMENT OF



                                         PATHOLOGY AND



                                         GENOMIC MEDICINE

 

   



                 Basophils       0.2             0.0 - 1.0 %     [facility] DEPARTMENT OF



                                         PATHOLOGY AND



                                         GENOMIC MEDICINE

 

   



                 Immature granulocytes     0.9Comment: "Immature     0.0 - 1.0 %     [facility] DEPARTMENT OF





                           granulocytes"  (promyelocytes,      PATHOLOGY AND



                           myelocytes, metamyelocytes)      GENOMIC MEDICINE













                                         Specimen

 





                                         Blood









   



                 Performing Organization     Address         City/State/Zipcode     Phone Number

 

   



                     [facility] DEPARTMENT OF     [address] [place] [address] 



                                         PATHOLOGY AND GENOMIC   



                                         MEDICINE   





* Comprehensive metabolic panel (2018 10:43 PM CST)





   



                 Component       Value           Ref Range       Performed At

 

   



                 Sodium          137             135 - 148 mEq/L     [facility] DEPARTMENT OF



                                         PATHOLOGY AND



                                         GENOMIC MEDICINE

 

   



                 Potassium       SEE COMMENT     3.5 - 5.0 mEq/L     [facility] DEPARTMENT OF



                           Comment:                  PATHOLOGY AND



                           Footnote---------         GENOMIC MEDICINE



                                         Sample is hemolyzed.  

 

   



                 Chloride        99              98 - 112 mEq/L     [facility] DEPARTMENT OF



                                         PATHOLOGY AND



                                         GENOMIC MEDICINE

 

   



                 CO2             25              24 - 31 mEq/L     [facility] DEPARTMENT OF



                                         PATHOLOGY AND



                                         GENOMIC MEDICINE

 

   



                 Anion gap       13              7 - 15 mEq/L     [facility] DEPARTMENT OF



                           Comment:                  PATHOLOGY AND



                           Starting from       [facility] MEDICINE



                                         , anion gap calculation  



                                         no longer incorporates  



                                         potassium. Please note the  



                                         change.  

 

   



                 BUN             8               8 - 23 mg/dL     [facility] DEPARTMENT OF



                                         PATHOLOGY AND



                                         GENOMIC MEDICINE

 

   



                 Creatinine      0.5             0.5 - 0.9 mg/dL     [facility] DEPARTMENT OF



                                         PATHOLOGY AND



                                         GENOMIC MEDICINE

 

   



                 Glucose         99              65 - 99 mg/dL     [facility] DEPARTMENT OF



                                         PATHOLOGY AND



                                         GENOMIC MEDICINE

 

   



                 Calcium         8.7 (L)         8.8 - 10.2 mg/dL     [facility] DEPARTMENT OF



                                         PATHOLOGY AND



                                         GENOMIC MEDICINE

 

   



                 Protein         7.7             6.3 - 8.3 g/dL     [facility] DEPARTMENT OF



                           Comment:                  PATHOLOGY AND



                                 GENOMIC MEDICINE



                                          4.6-7.0 g/dL  



                                         1  



                                         week  



                                          4.4-7.6 g/dL  



                                         7  



                                         months-1year  



                                         5.1-7.3 g/dL  



                                         1-2  



                                         years5.6-7  



                                         .5 g/dL  



                                         >3  



                                         years6.0-8  



                                         .0 g/dL  



                                           



                                          6.3-8.3 g/dL  

 

   



                 Albumin         3.1 (L)         3.5 - 5.0 g/dL     [facility] DEPARTMENT OF



                                         PATHOLOGY AND



                                         GENOMIC MEDICINE

 

   



                 A/G ratio       0.7             0.7 - 3.8       [facility] DEPARTMENT OF



                                         PATHOLOGY AND



                                         GENOMIC MEDICINE

 

   



                 Alkaline phosphatase     SEE COMMENTComment:     35 - 104 U/L     [facility] DEPARTMENT OF



                           Footnote---------         PATHOLOGY AND



                                         GENOMIC MEDICINE

 

   



                 AST             SEE COMMENTComment:     10 - 35 U/L     [facility] DEPARTMENT OF



                           Footnote---------         PATHOLOGY AND



                                         GENOMIC MEDICINE

 

   



                 ALT             SEE COMMENTComment:     5 - 50 U/L      [facility] DEPARTMENT OF



                           Footnote---------         PATHOLOGY AND



                                         GENOMIC MEDICINE

 

   



                 Total bilirubin     0.6             0.0 - 1.2 mg/dL     [facility] DEPARTMENT OF



                                         PATHOLOGY AND



                                         GENOMIC MEDICINE













                                         Specimen

 





                                         Plasma specimen









   



                 Performing Organization     Address         City/State/Zipcode     Phone Number

 

   



                     [facility] DEPARTMENT OF     [address] [place] [address] 



                                         PATHOLOGY AND GENOMIC   



                                         MEDICINE   





* XR Chest 2 Vw (2018  9:20 PM CST)





 



                           Narrative                 Performed At

 

 



                           Study:XR CHEST 2 VW        [facility]



                                         History: fever 



                                         COMPARISON: None. 



                                         IMPRESSION: 



                                         2 views of the chest.There is a streaky opacity in the retrocardiac

 



                                         region which could represent some scarring or infiltrate. There is some mild 



                                         reticulation in the lower zones of both lungs could be related to scarring. No 





                                         lobar consolidation or 



                                         pleural effusion. No pneumothorax.Cardiac silhouette is 



                                         normal.Visualized osseous structures arewithout acute abnormality. 



                                         [facility]-[id number] 









                                        Procedure Note

 

                                        



Hm Interface, Radiology Results Incoming - 2018  9:24 PM CST



Study:XR CHEST 2 VW



History: fever



COMPARISON: None.



IMPRESSION:



                                        2 views of the chest.      There is a streaky opacity in the retrocardiac region

 which could represent some scarring or infiltrate. There is some mild 
reticulation in the lower zones of both lungs could be related to scarring. No 
lobar consolidation or 

pleural effusion. No pneumothorax.      Cardiac silhouette is normal.  
Visualized osseous structures are  without acute abnormality.



[facility]-[id number]











   



                 Performing Organization     Address         City/State/Zipcode     Phone Number

 

   



                      [facility]          [address] [place] [address] 





after 2018



Insurance







     



            Payer      Benefit     Subscriber ID     Type       Phone      Address



                                         Plan /    



                                         Group    

 

     



                 [employer]     [employer]           xxxxxxxxx       HMO  



                                         [employer]    



                                         NG HMO MCR    



                                         ADV    









     



            Guarantor Name     Account     Relation to     Date of     Phone      Billing Address



                     Type                Patient             Birth  

 

     



            Renetta Funez     Personal/F     Self       1955     [phone]     [address]



                     amily               (Home)              [address]



                                         [place] [address]







Advance Directives





Patient has advance care planning documents, and code status on file. For more i
nformation, please contact:



Juan Garrett



[address] [place] [address]









                          Date Inactivated          Comments



                           Code Status               Date Activated  

 

                          3/6/2018  1:55 PM          



                           Full Code                 3/6/2018  7:39 AM  









  



                           Code Status decision reached by:     Patient

## 2019-02-09 NOTE — DIAGNOSTIC IMAGING REPORT
Exam:  Left hip radiographs- 2 views; AP radiograph of the pelvis - 1 view



Indication: Status post fall with pain to left hip. 



Comparison: None.



Findings: 

Left hip: 

No evidence of acute fracture or malalignment.



Pelvis:

No evidence of acute fracture or malalignment. Mild degenerative changes of

bilateral hips and pubic symphysis.



Surgical clips project over the lower abdomen.



Impression:

No acute radiographic abnormality.





Signed by: Dr. Kelli Che MD on 2/9/2019 2:20 PM

## 2019-03-22 ENCOUNTER — HOSPITAL ENCOUNTER (EMERGENCY)
Dept: HOSPITAL 88 - ER | Age: 64
Discharge: HOME | End: 2019-03-22
Payer: MEDICARE

## 2019-03-22 VITALS — WEIGHT: 167 LBS | BODY MASS INDEX: 28.51 KG/M2 | HEIGHT: 64 IN

## 2019-03-22 VITALS — DIASTOLIC BLOOD PRESSURE: 89 MMHG | SYSTOLIC BLOOD PRESSURE: 153 MMHG

## 2019-03-22 DIAGNOSIS — Z87.19: ICD-10-CM

## 2019-03-22 DIAGNOSIS — R50.9: Primary | ICD-10-CM

## 2019-03-22 DIAGNOSIS — J20.8: ICD-10-CM

## 2019-03-22 DIAGNOSIS — R05: ICD-10-CM

## 2019-03-22 PROCEDURE — 93005 ELECTROCARDIOGRAM TRACING: CPT

## 2019-03-22 PROCEDURE — 99283 EMERGENCY DEPT VISIT LOW MDM: CPT

## 2019-03-22 PROCEDURE — 71046 X-RAY EXAM CHEST 2 VIEWS: CPT

## 2019-03-22 PROCEDURE — 87400 INFLUENZA A/B EACH AG IA: CPT

## 2019-03-22 NOTE — DIAGNOSTIC IMAGING REPORT
EXAMINATION:  CHEST 2 VIEWS    



INDICATION:      Cough  

^cough

^[id number]

^1726 



COMPARISON:  None

     

FINDINGS:

TUBES and LINES:  None.



LUNGS:  Lungs are well inflated.  Perihilar peribronchial hazy opacity could be

due to bronchitis.   There is no evidence of pneumonia or pulmonary edema.



PLEURA:  No pleural effusion or pneumothorax.



HEART AND MEDIASTINUM:  The cardiomediastinal silhouette is unremarkable.    



BONES AND SOFT TISSUES:  No acute osseous lesion.  Soft tissues are

unremarkable.



UPPER ABDOMEN: No free air under the diaphragm.    



IMPRESSION: 

Perihilar peribronchial hazy opacity could be due to bronchitis.





Signed by: Dr. Andrea Mitchell M.D. on 3/22/2019 5:53 PM

## 2019-03-22 NOTE — XMS REPORT
Clinical Summary

                             Created on: 2019



Renetta Funez

External Reference #: [id number]

: 1955

Sex: Female



Demographics







                          Address                   [address] [facility]

[place]  [address]

 

                          Home Phone                [phone]

 

                          Preferred Language        English

 

                          Marital Status            

 

                          Pentecostal Affiliation     Unknown

 

                          Race                      White

 

                          Ethnic Group              /Latin





Author







                          Author                    Juan Church

 

                          Organization              [facility]

 

                          Address                   Unknown

 

                          Phone                     Unavailable







Support







                Name            Relationship    Address         Phone

 

                Humza Patrick            Unknown         [phone]







Care Team Providers







                    Care Team Member Name    Role                Phone

 

                    Asked, No Pcp       PCP                 Unavailable







Allergies







                                        Comments



                 Active Allergy     Reactions       Severity        Noted Date 

 

                                        



Blisters



                     Morphine            Hives               2018 







Medications







                          End Date                  Status



              Medication     Sig          Dispensed     Refills      Start  



                                         Date  

 

                                                    Active



                     clonAZEPAM (KlonoPIN) 1     Take 1 mg by        0   



                           MG tablet                 mouth every 8     



                                         (eight) hours     



                                         as needed for     



                                         anxiety.     

 

                                                    Active



                     clonAZEPAM (KlonoPIN) 2     Take 2 mg by        0   



                           MG tablet                 mouth every 8     



                                         (eight) hours     



                                         as needed for     



                                         anxiety.     

 

                                                    Active



                     diclofenac (VOLTAREN) 1 %     Apply 1             0   



                           gel                       application     



                                         topically     



                                         every 6 (six)     



                                         hours as     



                                         needed     



                                         (pain).     

 

                                                    Active



                     HYDROcodone-acetaminophen     Take 1 tablet       0   



                           (NORCO)  mg per     by mouth     



                           tablet                    every 8     



                                         (eight) hours     



                                         as needed for     



                                         moderate     



                                         pain.     

 

                                                    Active



                     promethazine-codeine     Take 5 mL by        0   



                           (PHENERGAN with CODEINE)     mouth every 6     



                           6.25-10 mg/5 mL syrup     (six) hours     



                                         as needed for     



                                         cough.     

 

                                                    Active



                     zolpidem (AMBIEN) 10 mg     Take 10 mg by       0   



                           tablet                    mouth nightly     



                                         as needed for     



                                         sleep.     

 

                                                    Active



                     cholecalciferol, vitamin     Take 1,000          0   



                           D3, (VITAMIN D3) 1,000     Units by     



                           unit tablet               mouth daily.     

 

                                                    Active



                     ascorbic acid, vitamin C,     Take 500 mg         0   



                           (VITAMIN C) 500 MG tablet     by mouth     



                                         daily.     

 

                                                    Active



                     TURMERIC ROOT EXTRACT     Take 1 tablet       0   



                           ORAL                      by mouth     



                                         daily.     







Active Problems







 



                           Problem                   Noted Date

 

 



                           Pneumonia of right middle lobe due to infectious organism     2018

 

 



                           Atypical pneumonia        2018







Social History







                                        Date



                 Tobacco Use     Types           Packs/Day       Years Used 

 

                                         



                                         Never Smoker    

 

    



                                         Smokeless Tobacco: Never   



                                         Used   









   



                 Alcohol Use     Drinks/Week     oz/Week         Comments

 

   



                                         No   









 



                           Sex Assigned at Birth     Date Recorded

 

 



                                         Not on file 









                                        Industry



                           Job Start Date            Occupation 

 

                                        Not on file



                           Not on file               Not on file 









                                        Travel End



                           Travel History            Travel Start 

 





                                         No recent travel history available.







Last Filed Vital Signs

Not on file



Plan of Treatment







   



                 Health Maintenance     Due Date        Last Done       Comments

 

   



                           CERVICAL CANCER SCREENING     1974  

 

   



                           BREAST CANCER SCREENING     2003  

 

   



                           COLON CANCER SCREENING     2003  

 

   



                           SHINGLES VACCINES (#1)     2003  

 

   



                           65+ PNEUMOCOCCAL VACCINE     2018  



                                         (1 of 2 - PCV13)   

 

   



                           PNEUMOCOCCAL              2018  



                                         POLYSACCHARIDE VACCINE   



                                         AGE 65 AND OVER   

 

   



                           INFLUENZA VACCINE         2018  







Results

Not on fileafter 2018



Insurance







     



            Payer      Benefit     Subscriber ID     Type       Phone      Address



                                         Plan /    



                                         Group    

 

     



                 [employer]     [employer]           xxxxxxxxx       HMO  



                                         [facility]    



                                         [facility]    



                                         [facility]    









     



            Guarantor Name     Account     Relation to     Date of     Phone      Billing Address



                     Type                Patient             Birth  

 

     



            Renetta Funez     Personal/F     Self       1955     [phone]     [address]

 [address]



                     [place]               (Home)              [address]



                                         [place] [address]







Advance Directives





Patient has advance care planning documents, and code status on file. For more i
nformation, please contact:



Juan Garrett



[address] [place] [address]









                          Date Inactivated          Comments



                           Code Status               Date Activated  

 

                          3/6/2018  1:55 PM          



                           Full Code                 3/6/2018  7:39 AM  









  



                           Code Status decision reached by:     Patient

## 2019-03-22 NOTE — XMS REPORT
Clinical Summary

                             Created on: 2019



Renetta Funez

External Reference #: [id number]

: 1955

Sex: Female



Demographics







                          Address                   [address] [facility]

[place]  [address]

 

                          Home Phone                [phone]

 

                          Preferred Language        English

 

                          Marital Status            

 

                          Episcopalian Affiliation     Unknown

 

                          Race                      White

 

                          Ethnic Group              /Latin





Author







                          Author                    J[facility]

 

                          Organization              [facility]

 

                          Address                   Unknown

 

                          Phone                     Unavailable







Support







                Name            Relationship    Address         Phone

 

                Humza Patrick            Unknown         [phone]







Care Team Providers







                    Care Team Member Name    Role                Phone

 

                    Asked, No Pcp       PCP                 Unavailable







Allergies







                                        Comments



                 Active Allergy     Reactions       Severity        Noted Date 

 

                                        



Blisters



                     Morphine            Hives               2018 







Medications







                          End Date                  Status



              Medication     Sig          Dispensed     Refills      Start  



                                         Date  

 

                                                    Active



                     clonAZEPAM (KlonoPIN) 1     Take 1 mg by        0   



                           MG tablet                 mouth every 8     



                                         (eight) hours     



                                         as needed for     



                                         anxiety.     

 

                                                    Active



                     clonAZEPAM (KlonoPIN) 2     Take 2 mg by        0   



                           MG tablet                 mouth every 8     



                                         (eight) hours     



                                         as needed for     



                                         anxiety.     

 

                                                    Active



                     diclofenac (VOLTAREN) 1 %     Apply 1             0   



                           gel                       application     



                                         topically     



                                         every 6 (six)     



                                         hours as     



                                         needed     



                                         (pain).     

 

                                                    Active



                     HYDROcodone-acetaminophen     Take 1 tablet       0   



                           (NORCO)  mg per     by mouth     



                           tablet                    every 8     



                                         (eight) hours     



                                         as needed for     



                                         moderate     



                                         pain.     

 

                                                    Active



                     promethazine-codeine     Take 5 mL by        0   



                           (PHENERGAN with CODEINE)     mouth every 6     



                           6.25-10 mg/5 mL syrup     (six) hours     



                                         as needed for     



                                         cough.     

 

                                                    Active



                     zolpidem (AMBIEN) 10 mg     Take 10 mg by       0   



                           tablet                    mouth nightly     



                                         as needed for     



                                         sleep.     

 

                                                    Active



                     cholecalciferol, vitamin     Take 1,000          0   



                           D3, (VITAMIN D3) 1,000     Units by     



                           unit tablet               mouth daily.     

 

                                                    Active



                     ascorbic acid, vitamin C,     Take 500 mg         0   



                           (VITAMIN C) 500 MG tablet     by mouth     



                                         daily.     

 

                                                    Active



                     TURMERIC ROOT EXTRACT     Take 1 tablet       0   



                           ORAL                      by mouth     



                                         daily.     







Active Problems







 



                           Problem                   Noted Date

 

 



                           Pneumonia of right middle lobe due to infectious organism     2018

 

 



                           Atypical pneumonia        2018







Social History







                                        Date



                 Tobacco Use     Types           Packs/Day       Years Used 

 

                                         



                                         Never Smoker    

 

    



                                         Smokeless Tobacco: Never   



                                         Used   









   



                 Alcohol Use     Drinks/Week     oz/Week         Comments

 

   



                                         No   









 



                           Sex Assigned at Birth     Date Recorded

 

 



                                         Not on file 









                                        Industry



                           Job Start Date            Occupation 

 

                                        Not on file



                           Not on file               Not on file 









                                        Travel End



                           Travel History            Travel Start 

 





                                         No recent travel history available.







Last Filed Vital Signs

Not on file



Plan of Treatment







   



                 Health Maintenance     Due Date        Last Done       Comments

 

   



                           CERVICAL CANCER SCREENING     1974  

 

   



                           BREAST CANCER SCREENING     2003  

 

   



                           COLON CANCER SCREENING     2003  

 

   



                           SHINGLES VACCINES (#1)     2003  

 

   



                           65+ PNEUMOCOCCAL VACCINE     2018  



                                         (1 of 2 - PCV13)   

 

   



                           PNEUMOCOCCAL              2018  



                                         POLYSACCHARIDE VACCINE   



                                         AGE 65 AND OVER   

 

   



                           INFLUENZA VACCINE         2018  







Results

Not on fileafter 2018



Insurance







     



            Payer      Benefit     Subscriber ID     Type       Phone      Address



                                         Plan /    



                                         Group    

 

     



                 [employer]     [employer]           xxxxxxxxx       HMO  



                                         [employer]    



                                         [facility]    



                                         ADV    









     



            Guarantor Name     Account     Relation to     Date of     Phone      Billing Address



                     Type                Patient             Birth  

 

     



            Renetta Funez     Personal/F     Self       1955     [phone]     [address]

 [address]



                     [address]               (Home)              [address]



                                         [place] [address]







Advance Directives





Patient has advance care planning documents, and code status on file. For more i
nformation, please contact:



Juan Garrett



[address] [place] [address]









                          Date Inactivated          Comments



                           Code Status               Date Activated  

 

                          3/6/2018  1:55 PM          



                           Full Code                 3/6/2018  7:39 AM  









  



                           Code Status decision reached by:     Patient

## 2020-06-06 ENCOUNTER — HOSPITAL ENCOUNTER (EMERGENCY)
Dept: HOSPITAL 88 - ER | Age: 65
LOS: 1 days | Discharge: HOME | End: 2020-06-07
Payer: MEDICARE

## 2020-06-06 VITALS — WEIGHT: 167 LBS | BODY MASS INDEX: 28.51 KG/M2 | HEIGHT: 64 IN

## 2020-06-06 DIAGNOSIS — N39.0: ICD-10-CM

## 2020-06-06 DIAGNOSIS — K21.9: ICD-10-CM

## 2020-06-06 DIAGNOSIS — Z87.19: ICD-10-CM

## 2020-06-06 DIAGNOSIS — R07.89: Primary | ICD-10-CM

## 2020-06-06 LAB
ALBUMIN SERPL-MCNC: 3.2 G/DL (ref 3.5–5)
ALBUMIN/GLOB SERPL: 0.9 {RATIO} (ref 0.8–2)
ALP SERPL-CCNC: 96 IU/L (ref 40–150)
ALT SERPL-CCNC: 8 IU/L (ref 0–55)
ANION GAP SERPL CALC-SCNC: 11.1 MMOL/L (ref 8–16)
BACTERIA URNS QL MICRO: (no result) /HPF
BASOPHILS # BLD AUTO: 0 10*3/UL (ref 0–0.1)
BASOPHILS NFR BLD AUTO: 0.6 % (ref 0–1)
BILIRUB UR QL: NEGATIVE
BUN SERPL-MCNC: 14 MG/DL (ref 7–26)
BUN/CREAT SERPL: 16 (ref 6–25)
CALCIUM SERPL-MCNC: 8.9 MG/DL (ref 8.4–10.2)
CHLORIDE SERPL-SCNC: 105 MMOL/L (ref 98–107)
CK MB SERPL-MCNC: 0.7 NG/ML (ref 0–5)
CK SERPL-CCNC: 33 IU/L (ref 29–168)
CLARITY UR: (no result)
CO2 SERPL-SCNC: 25 MMOL/L (ref 22–29)
COLOR UR: YELLOW
DEPRECATED APTT PLAS QN: 27.6 SECONDS (ref 23.8–35.5)
DEPRECATED INR PLAS: 0.81
DEPRECATED NEUTROPHILS # BLD AUTO: 3.9 10*3/UL (ref 2.1–6.9)
DEPRECATED RBC URNS MANUAL-ACNC: (no result) /HPF (ref 0–5)
EGFRCR SERPLBLD CKD-EPI 2021: > 60 ML/MIN (ref 60–?)
EOSINOPHIL # BLD AUTO: 0 10*3/UL (ref 0–0.4)
EOSINOPHIL NFR BLD AUTO: 0.7 % (ref 0–6)
EPI CELLS URNS QL MICRO: (no result) /LPF
ERYTHROCYTE [DISTWIDTH] IN CORD BLOOD: 14.8 % (ref 11.7–14.4)
GLOBULIN PLAS-MCNC: 3.7 G/DL (ref 2.3–3.5)
GLUCOSE SERPLBLD-MCNC: 89 MG/DL (ref 74–118)
HCT VFR BLD AUTO: 33 % (ref 34.2–44.1)
HGB BLD-MCNC: 10.1 G/DL (ref 12–16)
KETONES UR QL STRIP.AUTO: NEGATIVE
LEUKOCYTE ESTERASE UR QL STRIP.AUTO: (no result)
LYMPHOCYTES # BLD: 1 10*3/UL (ref 1–3.2)
LYMPHOCYTES NFR BLD AUTO: 18 % (ref 18–39.1)
MCH RBC QN AUTO: 26.2 PG (ref 28–32)
MCHC RBC AUTO-ENTMCNC: 30.6 G/DL (ref 31–35)
MCV RBC AUTO: 85.7 FL (ref 81–99)
MONOCYTES # BLD AUTO: 0.4 10*3/UL (ref 0.2–0.8)
MONOCYTES NFR BLD AUTO: 7.1 % (ref 4.4–11.3)
MUCOUS THREADS URNS QL MICRO: (no result)
NEUTS SEG NFR BLD AUTO: 73 % (ref 38.7–80)
NITRITE UR QL STRIP.AUTO: POSITIVE
PLATELET # BLD AUTO: 258 X10E3/UL (ref 140–360)
POTASSIUM SERPL-SCNC: 4.1 MMOL/L (ref 3.5–5.1)
PROT UR QL STRIP.AUTO: NEGATIVE
PROTHROMBIN TIME: 11.7 SECONDS (ref 11.9–14.5)
RBC # BLD AUTO: 3.85 X10E6/UL (ref 3.6–5.1)
SODIUM SERPL-SCNC: 137 MMOL/L (ref 136–145)
SP GR UR STRIP: 1.02 (ref 1.01–1.02)
UROBILINOGEN UR STRIP-MCNC: 0.2 MG/DL (ref 0.2–1)
WBC #/AREA URNS HPF: (no result) /HPF (ref 0–5)

## 2020-06-06 PROCEDURE — 99284 EMERGENCY DEPT VISIT MOD MDM: CPT

## 2020-06-06 PROCEDURE — 84484 ASSAY OF TROPONIN QUANT: CPT

## 2020-06-06 PROCEDURE — 85730 THROMBOPLASTIN TIME PARTIAL: CPT

## 2020-06-06 PROCEDURE — 71045 X-RAY EXAM CHEST 1 VIEW: CPT

## 2020-06-06 PROCEDURE — 85610 PROTHROMBIN TIME: CPT

## 2020-06-06 PROCEDURE — 93005 ELECTROCARDIOGRAM TRACING: CPT

## 2020-06-06 PROCEDURE — 85379 FIBRIN DEGRADATION QUANT: CPT

## 2020-06-06 PROCEDURE — 83880 ASSAY OF NATRIURETIC PEPTIDE: CPT

## 2020-06-06 PROCEDURE — 87186 SC STD MICRODIL/AGAR DIL: CPT

## 2020-06-06 PROCEDURE — 82550 ASSAY OF CK (CPK): CPT

## 2020-06-06 PROCEDURE — 80053 COMPREHEN METABOLIC PANEL: CPT

## 2020-06-06 PROCEDURE — 78580 LUNG PERFUSION IMAGING: CPT

## 2020-06-06 PROCEDURE — 36415 COLL VENOUS BLD VENIPUNCTURE: CPT

## 2020-06-06 PROCEDURE — 81001 URINALYSIS AUTO W/SCOPE: CPT

## 2020-06-06 PROCEDURE — 87040 BLOOD CULTURE FOR BACTERIA: CPT

## 2020-06-06 PROCEDURE — 82553 CREATINE MB FRACTION: CPT

## 2020-06-06 PROCEDURE — 87086 URINE CULTURE/COLONY COUNT: CPT

## 2020-06-06 PROCEDURE — 85025 COMPLETE CBC W/AUTO DIFF WBC: CPT

## 2020-06-06 NOTE — EMERGENCY DEPARTMENT NOTE
History of Present Illnes


History of Present Illness


Chief Complaint:  Chest Pain


History of Present Illness


This is a 65 year old  female patient presents with complaint of she was

sleep 2 nights ago had reflux and aspirated. States ran a fever last night of 

102 but has not had a fever since. Denies cough. No shortness of breath. However

does complain of left-sided chest pain for over 24 hours and has been constant 

it starts in her left scapula and radiates around to the left lateral aspect of 

her chest. .


Historian:  Patient


Arrival Mode:  Car


Onset (how long ago):  day(s) (2)


Location:  chest


Quality:  pain to left lateral and posterior chest


Radiation:  non-radiation


Severity:  moderate


Onset quality:  sudden


Duration (how long):  day(s) (2)


Progression:  unchanged


Chronicity:  recurrent


Context:  recent illness, recent surgery


Relieving factors:  none


Exacerbating factors:  none


Associated symptoms:  denies other symptoms


Treatments prior to arrival:  none





Past Medical/Family History


Physician Review


I have reviewed the patient's past medical and family history.  Any updates have

been documented here.





Past Medical History


Recent Fever:  No


Clinical Suspicion of Infectio:  No


New/Unexplained Change in Ment:  No


Past Medical History:  GERD, Osteoarthritis


Other Medical History:  


Diverliticulitis 





Bowel obstructions. 





Aspiration Pneumonia


Past Surgical History:  Cholecysctectomy, Hysterectomy, Bariatric Surgery, Colon

Resection


Other Surgery:  


Gastric Bypass 11 years ago





Bowel Resection x3





Social History


Smoking Cessation:  Never Smoker


Alcohol Use:  None


Any Illegal Drug Use:  No





Family History


Family history of heart diseas:  No


Other family history


htn





Other


Last Tetanus:  2011





Review of Systems


Review of Systems


Constitutional:  no symptoms


EENTM:  no symptoms


Cardiovascular:  as per HPI


Respiratory:  as per HPI


Gastrointestinal:  no symptoms


Genitourinary:  no symptoms


Musculoskeletal:  no symptoms


Neurological:  no symptoms


Psychological:  no symptoms


Endocrine:  no symptoms


Hematological/Lymphatic:  no symptoms


Review of other systems


All other systems reviewed and negative.





Physical Exam


Related Data


Allergies:  


Coded Allergies:  


     No Known Allergies (Unverified , 6/6/20)


Triage Vital Signs





Vital Signs








  Date Time  Temp Pulse Resp B/P (MAP) Pulse Ox O2 Delivery O2 Flow Rate FiO2


 


6/6/20 17:57 97.8 83 16 149/84 98   








Vital signs reviewed:  Yes





Physical Exam


CONSTITUTIONAL





Constitutional:  well-developed, well-nourished


HENT


HENT:  normocephalic, atraumatic, oropharynx clear/moist, nose normal


HENT L/R:  left ext ear normal, right ext ear normal


EYES





Eyes:  PERRL, conjunctivae normal


NECK


Neck:  ROM normal


PULMONARY


Pulmonary:  effort normal, breath sounds normal


CARDIOVASCULAR





Cardiovascular:  regular rhythm, heart sounds normal, capillary refill normal, 

normal rate


GASTROINTESTINAL





Abdominal:  soft, nontender, bowel sounds normal


GENITOURINARY





Genitourinary:  exam deferred


SKIN


Skin:  warm, dry


MUSCULOSKELETAL





Musculoskeletal:  ROM normal


NEUROLOGICAL





Neurological:  alert, oriented x 3, no gross motor or sensory deficits


PSYCHOLOGICAL


Psychological:  mood/affect normal, judgement normal





Results


Laboratory


Result Diagram:  


6/6/20 1900





Laboratory





Laboratory Tests








Test


 6/6/20


19:00 6/6/20


18:14


 


White Blood Count


 5.34 x10e3/uL


(4.8-10.8) 





 


Red Blood Count


 3.85 x10e6/uL


(3.6-5.1) 





 


Hemoglobin


 10.1 g/dL


(12.0-16.0) 





 


Hematocrit


 33.0 %


(34.2-44.1) 





 


Mean Corpuscular Volume


 85.7 fL


(81-99) 





 


Mean Corpuscular Hemoglobin


 26.2 pg


(28-32) 





 


Mean Corpuscular Hemoglobin


Concent 30.6 g/dL


(31-35) 





 


Red Cell Distribution Width


 14.8 %


(11.7-14.4) 





 


Platelet Count


 258 x10e3/uL


(140-360) 





 


Neutrophils (%) (Auto)


 73.0 %


(38.7-80.0) 





 


Lymphocytes (%) (Auto)


 18.0 %


(18.0-39.1) 





 


Monocytes (%) (Auto)


 7.1  %


(4.4-11.3) 





 


Eosinophils (%) (Auto)


 0.7 %


(0.0-6.0) 





 


Basophils (%) (Auto)


 0.6 %


(0.0-1.0) 





 


Neutrophils # (Auto) 3.9 (2.1-6.9)  


 


Lymphocytes # (Auto) 1.0 (1.0-3.2)  


 


Monocytes # (Auto) 0.4 (0.2-0.8)  


 


Eosinophils # (Auto) 0.0 (0.0-0.4)  


 


Basophils # (Auto) 0.0 (0.0-0.1)  


 


Absolute Immature Granulocyte


(auto 0.03 x10e3/uL


(0-0.1) 





 


Prothrombin Time


 11.7 seconds


(11.9-14.5) 





 


Prothromb Time International


Ratio 0.81 


 





 


Activated Partial


Thromboplast Time 27.6 seconds


(23.8-35.5) 





 


D-Dimer Quantitative (PE/DVT)


 546.00 ng/mL


(0-400) 





 


Sodium Level


 137 mmol/L


(136-145) 





 


Potassium Level


 4.1 mmol/L


(3.5-5.1) 





 


Chloride Level


 105 mmol/L


() 





 


Carbon Dioxide Level


 25 mmol/L


(22-29) 





 


Anion Gap


 11.1 mmol/L


(8-16) 





 


Blood Urea Nitrogen


 14 mg/dL


(7-26) 





 


Creatinine


 0.87 mg/dL


(0.57-1.11) 





 


Estimat Glomerular Filtration


Rate > 60 ML/MIN


(60-) 





 


BUN/Creatinine Ratio 16 (6-25)  


 


Glucose Level


 89 mg/dL


() 





 


Calcium Level


 8.9 mg/dL


(8.4-10.2) 





 


Aspartate Amino Transf


(AST/SGOT) 14 IU/L (5-34) 


 





 


Alanine Aminotransferase


(ALT/SGPT) 8 IU/L (0-55) 


 





 


Alkaline Phosphatase


 96 IU/L


() 





 


Creatine Kinase


 33 IU/L


() 





 


Creatine Kinase MB


 0.70 ng/mL


(0-5.0) 





 


Troponin I


 < 0.001 ng/mL


(0-0.300) 





 


B-Type Natriuretic Peptide


 14.1 pg/mL


(0-100) 





 


Total Protein


 6.9 g/dL


(6.5-8.1) 





 


Albumin


 3.2 g/dL


(3.5-5.0) 





 


Globulin


 3.7 g/dL


(2.3-3.5) 





 


Albumin/Globulin Ratio 0.9 (0.8-2.0)  


 


Urine Color


 


 Yellow


(YELLOW)


 


Urine Clarity  Hazy (CLEAR) 


 


Urine pH  8.5 (5 - 7) 


 


Urine Specific Gravity


 


 1.025


(1.010-1.025)


 


Urine Protein


 


 Negative


(NEGATIVE)


 


Urine Glucose (UA)


 


 Negative


(NEGATIVE)


 


Urine Ketones


 


 Negative


(NEGATIVE)


 


Urine Blood


 


 Trace


(NEGATIVE)


 


Urine Nitrite


 


 Positive


(NEGATIVE)


 


Urine Bilirubin


 


 Negative


(NEGATIVE)


 


Urine Urobilinogen


 


 0.2 mg/dL (0.2


- 1)


 


Urine Leukocyte Esterase


 


 Large


(NEGATIVE)


 


Urine RBC


 


 6-10 /HPF


(0-5)


 


Urine WBC


 


 21-50 /HPF


(0-5)


 


Urine Epithelial Cells


 


 Few /LPF


(NONE)


 


Urine Bacteria


 


 Many /HPF


(NONE)


 


Urine Mucus


 


 Moderate


(RARE)








Laboratory Tests








Test


 6/6/20


19:00 6/6/20


18:14


 


White Blood Count


 5.34 x10e3/uL


(4.8-10.8) 





 


Red Blood Count


 3.85 x10e6/uL


(3.6-5.1) 





 


Hemoglobin


 10.1 g/dL


(12.0-16.0) 





 


Hematocrit


 33.0 %


(34.2-44.1) 





 


Mean Corpuscular Volume


 85.7 fL


(81-99) 





 


Mean Corpuscular Hemoglobin


 26.2 pg


(28-32) 





 


Mean Corpuscular Hemoglobin


Concent 30.6 g/dL


(31-35) 





 


Red Cell Distribution Width


 14.8 %


(11.7-14.4) 





 


Platelet Count


 258 x10e3/uL


(140-360) 





 


Neutrophils (%) (Auto)


 73.0 %


(38.7-80.0) 





 


Lymphocytes (%) (Auto)


 18.0 %


(18.0-39.1) 





 


Monocytes (%) (Auto)


 7.1  %


(4.4-11.3) 





 


Eosinophils (%) (Auto)


 0.7 %


(0.0-6.0) 





 


Basophils (%) (Auto)


 0.6 %


(0.0-1.0) 





 


Neutrophils # (Auto) 3.9 (2.1-6.9)  


 


Lymphocytes # (Auto) 1.0 (1.0-3.2)  


 


Monocytes # (Auto) 0.4 (0.2-0.8)  


 


Eosinophils # (Auto) 0.0 (0.0-0.4)  


 


Basophils # (Auto) 0.0 (0.0-0.1)  


 


Absolute Immature Granulocyte


(auto 0.03 x10e3/uL


(0-0.1) 





 


Urine Color


 


 Yellow


(YELLOW)


 


Urine Clarity  Hazy (CLEAR) 


 


Urine pH  8.5 (5 - 7) 


 


Urine Specific Gravity


 


 1.025


(1.010-1.025)


 


Urine Protein


 


 Negative


(NEGATIVE)


 


Urine Glucose (UA)


 


 Negative


(NEGATIVE)


 


Urine Ketones


 


 Negative


(NEGATIVE)


 


Urine Blood


 


 Trace


(NEGATIVE)


 


Urine Nitrite


 


 Positive


(NEGATIVE)


 


Urine Bilirubin


 


 Negative


(NEGATIVE)


 


Urine Urobilinogen


 


 0.2 mg/dL (0.2


- 1)


 


Urine Leukocyte Esterase


 


 Large


(NEGATIVE)


 


Urine RBC


 


 6-10 /HPF


(0-5)


 


Urine WBC


 


 21-50 /HPF


(0-5)


 


Urine Epithelial Cells


 


 Few /LPF


(NONE)


 


Urine Bacteria


 


 Many /HPF


(NONE)


 


Urine Mucus


 


 Moderate


(RARE)








Lab results reviewed:  Yes





Imaging


Imaging results reviewed:  Yes


Impressions


EXAMINATION:  CHEST SINGLE (PORTABLE)   





COMPARISON: Chest x-ray 3/22/2019





INDICATION:  


^POSS ASPIRATION


^[id number]


^1830  





DISCUSSION:


Frontal view of the chest obtained at 1757 hours.





HEART AND MEDIASTINUM:  The heart is normal in size of cord is tortuous


calcifications of the arch


  


LINES:  None.





LUNGS:  The lungs are well inflated and clear. No pneumonia or pulmonary edema.





PLEURA:  No pleural effusion or pneumothorax.





BONES AND SOFT TISSUES:  No focal osseous lesion. The soft tissues are normal.








IMPRESSION: 


No acute cardiopulmonary disease.





Signed by: Dr. Mikel Nails MD on 6/6/2020 7:24 PM








Dictated By: MIKEL NAILS MD


Electronically Signed By: MIKEL NAILS MD on 06/06/20 1924


Transcribed By: KACEY on 06/06/20 1924 





Perfusion Lung Scan





NOTE: Lung ventilation studies with xenon are not being performed per the


recommendation of the Society of Nuclear Medicine and Molecular Imaging.  It is


not possible to be certain that the ventilation system is adequately


disinfected.  Ventilation studies with Tc-99m DTPA particles is contraindicated


because the delivery by nebulization generates too many water droplets from the


patient's airway. 


 


Clinical Information: 65 F with left-sided chest pain





Comparison: Chest radiograph 6/6/2020





Discussion:  Ventilation images were not obtained.  See note above.





Perfusion images of the lungs were obtained in multiple projections following


intravenous administration of approximately 6 mCi of Tc-99m MAA. Distribution


of tracer is irregular throughout the lungs,  There are no segmental perfusion


defects of any size.  





The cardiomediastinal silhouette is unremarkable.





Impression: 





1.  Scan findings represent a VERY LOW probability for acute pulmonary embolic


disease based on the perfusion only PIOPED criteria.  Ventilation imaging would


not change the assigned probability.


2.  Scan findings are compatible with diffuse parenchymal and/or obstructive


lung disease. 





Signed by: Dr. Selene Zapata M.D. on 6/6/2020 11:33 PM





Procedures


12 Lead ECG Interpretation


:  Interpreted by LUIS physician


Date:  Jun 6, 2020


Time:  19:00


Rhythm:  sinus rhythm


Rate:  normal


BPM:  80


QRS axis:  normal


ST segments normal:  Yes


T waves normal:  Yes


Other findings:  no other findings


Clinical Impression:  normal ECG





Critical Care Time


Subsequent provider


I assumed direction of critical care for this patient from another provider of 

my specialty.





Assessment & Plan


Assessment & Plan


Final Impression:  


(1) Chest wall pain


(2) UTI (urinary tract infection)


(3) GERD (gastroesophageal reflux disease)


Assessment & Plan


Patient presents with complaint of left lateral left back chest pain for greater

than 24 hours and started after aspirating 2 days ago while asleep. Patient 

reports she has bad reflux and occasionally aspirates while sleeping. States she

has aspirated multiple times in the last 10 years. Came in today because of the 

left side left back pain.





CBC, CMP, cardiac enzymes, d-dimer, EKG, chest x-ray, UA ordered to eval for 

myocardial infarction, pneumonia, UTI, electrolyte abnormality, pulmonary 

embolus.





Patient  found to have an elevated d-dimer and VQ scan ordered to eval for 

pulmonary embolus. Patient also has a UTI Rocephin 1 g IV ordered.





Patient's VQ scan low probability for pulmonary embolus. Patient discharged home

with diagnosis of chest wall pain UTI. Patient discharged home on Omnicef 300 mg

1 by mouth twice a day for 10 days.


Depart Disposition:  HOME, SELF-CARE


Last Vital Signs











  Date Time  Temp Pulse Resp B/P (MAP) Pulse Ox O2 Delivery O2 Flow Rate FiO2


 


6/6/20 19:09  76 18 139/85 100   


 


6/6/20 17:57 97.8       








Home Meds


Active Scripts


Loperamide Hcl* (IMODIUM*) 2 Mg Cap, 2 MG PO Q6H PRN for diarrhea for 14 Days, 

CAP


   Prov:COCO MARTIN MD         2/20/18


Levofloxacin (LEVAQUIN) 500 Mg Tablet, 500 MG PO DAILY for 7 Days


   Prov:COCO MARTIN MD         2/20/18


Metronidazole (FLAGYL) 500 Mg Tablet, 500 MG PO TID for 7 Days


   Prov:COCO MARTIN MD         2/20/18


Tramadol Hcl* (ULTRAM 50MG*) 50 Mg Tab, 50 MG PO Q8H PRN for PAIN, #20 TAB


   Prov:COCO MARTIN MD         2/20/18


Reported Medications


Clonazepam (CLONAZEPAM) 1 Mg Tablet, 1 MG PO TID, TAB


   1/16/17


Esomeprazole Magnesium (NEXIUM) 40 Mg Capsule.dr, 40 MG PO DAILY


   [facility] FOR NEXIUM PER [facility]


   1/16/17


Metoprolol Succinate (METOPROLOL SUCCINATE) 25 Mg Tab.er.24h, 25 MG PO PRN


   11/12/16


Hydrocodone Bit/Acetaminophen (NORCO  TABLET) 1 Each Tablet, 10 MG Q4HR, 

#50


   10/2/15


Zolpidem Tartrate (AMBIEN) 5 Mg Tablet, 10 MG PO BEDTIME, TAB


   10/15/14


Medications in the ED





Pantoprazole Sodium 40 mg ONCE  STAT IV  Last administered on 6/6/20at 19:19; 

Admin Dose 40 MG;  Start 6/6/20 at 18:05;  Stop 6/6/20 at 18:13;  Status DC


Ondansetron HCl 4 mg ONCE  STAT IV  Last administered on 6/6/20at 19:19; Admin 

Dose 4 MG;  Start 6/6/20 at 18:05;  Stop 6/6/20 at 18:13;  Status DC


Sodium Chloride 1,000 ml @  0 mls/hr Q0M STAT IV  Last administered on 6/6/20at 

19:19; Admin Dose 999 MLS/HR;  Start 6/6/20 at 18:05;  Stop 6/6/20 at 18:08;  

Status DC


Morphine Sulfate 2 mg NOW  STAT IV ;  Start 6/6/20 at 19:18;  Stop 6/6/20 at 

19:24;  Status DC











CARY NESS MD         Jun 6, 2020 19:40

## 2020-06-06 NOTE — XMS REPORT
Continuity of Care Document

                             Created on: 2020



JOAN RUIZ

External Reference #: [id number]

: 1955

Sex: Female



Demographics





                          Address                   [address]

[place]  [address]

 

                          Home Phone                [phone]

 

                          Preferred Language        English

 

                          Marital Status            Unknown

 

                          Islam Affiliation     Unknown

 

                          Race                      Unknown

 

                                        Additional Race(s) 

Other

White



 

                          Ethnic Group              /Latin





Author





                          Author                    [facility]

t

 

                          Organization              [facility]

 

                          Address                   [address]

[place]  [address]



 

                          Phone                     Unavailable







Support





                Name            Relationship    Address         Phone

 

                    GENEVA HURT   ECON                [address]

[address]

[place]  [address]                     [phone]

 

                    GENEVA HURT   PRS                 [address]

[place]  [address]                     [phone]

 

                    GENEVA HURT   PRS                 [address]

[place]  [address]                     [phone]

 

                    ELBERT RUIZ       PRS                 [address] [place]  [address]               [phone]







Care Team Providers





                    Care Team Member Name Role                Phone

 

                    CHEN DE ANDA III  PCP                 [phone]

 

                    ALETA GUTHRIE    Attphys             Unavailable

 

                    VICKIE NESS Attphys             Unavailable

 

                    TOM UGARTE  Attphys             Unavailable

 

                    ISABELLA BARONE     Admphys             Unavailable







Payers





           Payer Name Policy Type Policy Number Effective Date Expiration Date SUZIE Michael [facility]            [id number] 2019 00:00:00        

    [facility]







Problems





           Condition Name Condition Details Condition Category Status     Onset 

Date Resolution

Date            Last Treatment Date Treating Clinician Comments        Source

 

                          Pneumonia of right middle lobe due to infectious organ

ism Pneumonia of right 

middle lobe due to infectious organism Disease   Active    2018 00:00:00  

                             

                                        Juan Garrett

 

        Atypical pneumonia Atypical pneumonia Disease Active  2018 00:00:0

0                          

                                        Juan Garrett

 

       Acute diarrhea Acute diarrhea Problem Active                             

       [facility]

 

       Chest pain Chest pain Problem Active                                    C

[facility]

 

       Chronic abdominal pain Chronic abdominal pain Problem Active             

                       [facility]

 

                Slow transit constipation Constipation by delayed colonic transi

t Problem         Active

                                                                  [facility]

 

       Hypoglycemia Hypoglycemia Problem Active                                 

   [facility]







Allergies, Adverse Reactions, Alerts





        Allergy Name Allergy Type Status  Severity Reaction(s) Onset Date Inacti

ve Date 

Treating Clinician        Comments                  Source

 

        Morphine Propensity to adverse reactions Active  Mild    RASH    2018 00:00:00         

                                                    [facility]

 

           Morphine   Propensity to adverse reactions to drug Active            

    Hives      2018 

00:00:00                                        Blisters        Juan salazar

 

       No Known Allergies DA     Active U             2015 00:00:00       

               [facility]







Social History





           Social Habit Start Date Stop Date  Quantity   Comments   Source

 

           Sex Assigned At Birth                                             Isidro duenas Gerry

 

                Alcohol intake  2018 00:00:00 2018 00:00:00 Current 

non-drinker of 

alcohol (finding)                                   Juan Garrett







                Smoking Status  Start Date      Stop Date       Source

 

                Never smoker                                    Juan salazar







Medications





             Ordered Medication Name Filled Medication Name Start Date   Stop Da

te    Current 

Medication? Ordering Clinician Indication Dosage     Frequency  Signature (SIG) 

Comments                  Components                Source

 

       clonAZEPAM (KlonoPIN) 1 MG tablet        2018 18:50:20        Yes  

                1mg    Q8H    Take 1

mg by mouth every 8 (eight) hours as needed for anxiety.                        

                   Juan Garrett

 

       clonAZEPAM (KlonoPIN) 2 MG tablet        2018 18:50:20        Yes  

                2mg    Q8H    Take 2

mg by mouth every 8 (eight) hours as needed for anxiety.                        

                 Juan Garrett

 

        diclofenac (VOLTAREN) 1 % gel         2018 18:50:20         Yes   

                  1{application} Q6H

                Apply 1 application topically every 6 (six) hours as needed (myranda

n).                                   

Juan Garrett

 

             HYDROcodone-acetaminophen (NORCO)  mg per tablet             

 2018 18:50:20              

Yes                                    1{tbl}       Q8H          Take 1 tablet b

y mouth every 8 (eight) hours as needed for 

moderate pain.                                              Juan Garrett

 

                    promethazine-codeine (PHENERGAN with CODEINE) 6.25-10 mg/5 m

L syrup                     2018

18:50:20            Yes                           5mL       Q6H       Take 5 mL 

by mouth every 6 (six) hours as needed for 

cough.                                                      Juan Garrett

 

       zolpidem (AMBIEN) 10 mg tablet        2018 18:50:20        Yes     

             10mg   QD     Take 10 

mg by mouth nightly as needed for sleep.                                        

 Juan Garrett

 

                    cholecalciferol, vitamin D3, (VITAMIN D3) 1,000 unit tablet 

                    2018 

18:50:20        Yes                  1000U  QD     Take 1,000 Units by mouth aidan Garrett

 

           ascorbic acid, vitamin C, (VITAMIN C) 500 MG tablet            2018-0

3-07 18:50:20            Yes        

                    500mg     QD        Take 500 mg by mouth daily.             

        Juan Garrett

 

       TURMERIC ROOT EXTRACT ORAL        2018 18:50:20        Yes         

         1{tbl}        Take 1 

tablet by mouth daily.                                         Juan Garrett

 

                    Levofloxacin (Levaquin) 500 Mg Tablet Levofloxacin (Levaquin

) 500 Mg Tablet 

2018 00:00:00        Yes    Familia Medina Md        500           Daily     

           [facility]

 

                    Loperamide Hcl (Imodium*) 2 Mg Cap Loperamide Hcl (Imodium*)

 2 Mg Cap 2018

00:00:00        Yes    Familia Medina Md        2             Every 6 Hours as need

ed for Diarrhea               [facility]

 

                    Metronidazole (Flagyl) 500 Mg Tablet Metronidazole (Flagyl) 

500 Mg Tablet 

2018 00:00:00        Yes    Familia Medina Md        500           Three Time

s A Day               [facility]

 

                    Tramadol Hcl (Ultram 50MG*) 50 Mg Tab Tramadol Hcl (Ultram 5

0MG*) 50 Mg Tab 

2018 00:00:00           Yes       Familia Medina Md           50             

     Every 8 Hours as needed for Pain

                                                            [facility]

 

      Clonazepam 1 Mg Tablet Clonazepam 1 Mg Tablet             Yes             

  1           Three Times A Day 

                                                    [facility]

 

                          Esomeprazole Magnesium (Nexium) 40 Mg Capsule.dr Vides

prazole Magnesium (Nexium)

40 Mg Capsule.             Yes               40          Daily             [facility]

 

                          Hydrocodone Bit/Acetaminophen (Norco  Tablet) 1 

Each Tablet Hydrocodone 

Bit/Acetaminophen (Norco  Tablet) 1 Each Tablet                 Yes       

              10              Every 4 

Hours                                                       [facility]

 

             Metoprolol Succinate 25 Mg Tab.er.24h Metoprolol Succinate 25 Mg Ta

b.er.24h                           

Yes                     25              As Needed                 [facility]

 

                Zolpidem Tartrate (Ambien) 5 Mg Tablet Zolpidem Tartrate (Ambien

) 5 Mg Tablet                 

        Yes                     10              Bedtime                 [facility]

 

                                        Calcium Carbonate/Vitamin D3 (Calcium 50

0+D Tablet Chew) 1 Each Tab.chew, 2 Tab 

Oral                                    Calcium Carbonate/Vitamin D3 (Calcium 50

0+D Tablet Chew) 1 Each Tab.chew, 2

Tab Oral       2017 00:00:00 No                2           Daily          

   [facility]

 

                Clonazepam 0.5 Mg Tablet, 0.5 Mg Oral Clonazepam 0.5 Mg Tablet, 

0.5 Mg Oral                 

2017 00:00:00 No                      .5              Daily               

    [facility]

 

                          Ferrous Sulfate 325 Mg Tablet, 325 Mg Oral Ferrous Sul

fate 325 Mg Tablet, 325 Mg

Oral        2016 00:00:00 No                325         Daily             

[facility]

 

                          Metoprolol Tartrate 50 Mg Tablet, 50 Mg Oral Metoprolo

l Tartrate 50 Mg Tablet, 

50 Mg Oral       2016 00:00:00 No                50          Daily        

     [facility]

 

                          Hydrocodone Bit/Acetaminophen (Norco 5-325 Tablet) 1 E

ach Tablet, 1 Each Oral 

Hydrocodone Bit/Acetaminophen (Norco 5-325 Tablet) 1 Each Tablet, 1 Each Oral   

                        

2015-10-02 00:00:00 No                      1               Q4-6 Hrs as needed f

or Pain                 [facility]

 

                          Clomiphene Citrate (Serophene) 50 Mg Tablet, 50 Mg Ora

l Clomiphene Citrate 

(Serophene) 50 Mg Tablet, 50 Mg Oral         2014 00:00:00 No             

         50              At 

Bedtime                                                     [facility]

 

                          Clonazepam 0.5 Mg Tab.rapdis, 0.5 Mg Oral Clonazepam 0

.5 Mg Tab.rapdis, 0.5 Mg 

Oral        2014 00:00:00 No                .5          As Needed         

    [facility]

 

                          Esomeprazole Mag Trihydrate (Nexium) 40 Mg Capsule.dr,

 1 Tab Oral Esomeprazole 

Mag Trihydrate (Nexium) 40 Mg Capsule.dr, 1 Tab Oral              2014 00:

00:00 No                        

           1                     Daily                            [facility]

 

                          Losartan Potassium 50 Mg Tablet, 50 Tab Oral Losartan 

Potassium 50 Mg Tablet, 50

Tab Oral       2014 00:00:00 No                50          Daily          

   [facility]

 

                          Metoprolol Succinate (Toprol Xl) 50 Mg Tab.sr.24h, 50 

Tab Oral Metoprolol 

Succinate (Toprol Xl) 50 Mg Tab.sr.24h, 50 Tab Oral              2014 00:0

0:00 No                         

           50                    Daily                            [facility]

 

                          Zolpidem Tartrate (Ambien) 10 Mg Tablet, 10 Mg Oral Zo

lpidem Tartrate (Ambien) 

10 Mg Tablet, 10 Mg Oral       2014 00:00:00 No                10         

 Bedtime             [facility]

 

                          Paroxetine Hcl (Paxil) 40 Mg Tablet, 1 Tab Oral Paroxe

niles Hcl (Paxil) 40 Mg 

Tablet, 1 Tab Oral       2013 00:00:00 No                1           Daily

             [facility]

 

                          Quetiapine Fumarate (Seroquel) 50 Mg Tablet, 1 Tab Ora

l Quetiapine Fumarate 

(Seroquel) 50 Mg Tablet, 1 Tab Oral       2013 00:00:00 No                

1           Daily             

[facility]







Procedures





                Procedure       Date / Time Performed Performing Clinician [facility]

e

 

                X-ray of chest, two views 2019 00:00:00 MARGA PATRICIA  CH

I [facility]

 

                    Computed tomography of brain without radiopaque contrast 201

09 00:00:00 

LUCIUS CORBIN                       [facility]

 

                    Computed tomography of cervical spine without contrast  00:00:00 

LUCIUS CORBIN                       [facility]

 

                    CT of abdomen and pelvis without contrast 2018 00:00:0

0 CARY NESS                               [facility]







Plan of Care





             Planned Activity Planned Date Details      Comments     Source

 

                    Future Scheduled Test 2020 00:00:00 INFLUENZA VACCINE 

[code = INFLUENZA 

VACCINE]                                            [facility]

 

                    Future Scheduled Test 2018 00:00:00 65+ PNEUMOCOCCAL V

ACCINE (1 of 2 - 

PCV13) [code = 65+ PNEUMOCOCCAL VACCINE (1 of 2 - PCV13)]                       

    [facility]

 

                    Future Scheduled Test 2003 00:00:00 BREAST CANCER SCRE

ENING [code = BREAST

 CANCER SCREENING]                                  [facility] Scheduled Test 2003 00:00:00 COLONOSCOPY SCREEN

ING [code = 

COLONOSCOPY SCREENING]                              Juan Garrett

 

                    Future Scheduled Test 2003 00:00:00 SHINGLES VACCINES 

(#1) [code = 

SHINGLES VACCINES (#1)]                             Juan Garrett







Encounters





             Start Date/Time End Date/Time [facility] Encounter ID    Source

 

             2019 13:35:00 2019 13:35:00 Registered Emergency Room 1

            BERTRAM GUTHRIE         [facility]          [id number]    [facility]

 

             2019 12:57:00 2019 16:54:00 Departed Emergency Room 1  

          BERTRAM GUTHRIE

                [facility]          [id number]    [facility]

 

             2018 03:28:00 2018 06:00:00 Departed Emergency Room 1  

          CARY NESS         [facility]          [id number]    [facility]

 

          2018 12:36:00 2018 19:05:00 Departed Emergency Room       

              [facility]                    [id number]              [facility]

 

             2018 21:23:00 2018 14:01:00 Discharged Inpatient (obs) 

ER           ROBERTPAULINE

CHRISTIAN           [facility]          [id number]    [facility]







Results





           Test Description Test Time  Test Comments Results    Result Comments 

Source

 

                SCR MAMM BILATERAL IGLESIA CAD DIGITAL 2020 08:42:11         

         - SCR MAMM BILATERAL 

IGLESIA CAD DIGITALBILATERAL DIGITAL SCREENING MAMMOGRAM 3D/2D WITH CAD: 
2020CLINICAL: Asymptomatic.  Digital breast tomosynthesis was performed in 
addition to routine CC and MLO views.  Current mammographic images were 
evaluated by either a [employer] M-Vu or a [employer] ImageChecker CAD (computer aided 
detection system).  Comparison is made to exams dated  2018 mammogram,  mammogram, and 2016 mammogram - [facility]-.  There 
are scattered fibroglandular tissues in both breasts.  There are benign vascular
calcifications and calcifications in both breasts.  No suspicious mass, 
architectural distortion, malignant type calcification, or lymph node 
abnormality detected.  Breast architecture is stable compared to prior 
exams.IMPRESSION: BENIGNThere is no mammographic evidence of malignancy. Resume 
annual screening mammography in one year.  Eugene lombardo/penrad:2020 08:42:11  Imaging Technologist: Nichole ROMAN, [facility]-FWletter sent: BIRADS 1-2 Normal  Mammogram BI-RADS: 2 Benign      

                      

 

                - CT ABD PELVIS W/CONT 2019 11:54:00                   Nam

e: JOAN RUIZ                 

      [facility]     : 1955 Age/S: 64  / F         
[address]           Unit #: [id number]     Loc:               
[place] [address]                Phys: Arabella Hannon MD                       
                           Acct: [id number]  Dis Date:               Status: 
REG ER                                  PHONE #: [phone]     Exam Date: 
2019  1055                     FAX #: [phone]      Reason: epigastric
abd pain, nausea/vomiting, h/o ileus     EXAMS:                                 
             CPT CODE:      [id number] CT ABD PELVIS W/CONT                      
[address]                    REASON FOR EXAM: epigastric abd pain, nausea/vomiting, 
h/o ileus/SBO               EXAM ORDER DATE: 12/3/2019 9:41 AM               
Ordering M.BETINA: Arabella Hannon MD               PROCEDURE:  - CT ABD PELVIS 
W/CONT  contrast-enhanced axial CT images       were acquired through the 
abdomen/pelvis at 5 mm intervals.  Sagittal       and coronal reformatted images
were generated.  Automated exposure       control was utilized for this 
reduction.               Phases of contrast: Venous               COMPARISON: CT
of the abdomen and pelvis 2019               FINDINGS:                
Visualized thorax: Lung bases are clear. Atherosclerotic disease is       seen 
throughout the coronary arteries               Hepatobiliary system: Prior 
cholecystectomy. Hepatic parenchyma is       within normal limits               
Pancreas: Mildly atrophic               Spleen: Normal               Adrenal 
glands: Normal               Genitourinary system: Prior hysterectomy. Otherwise
normal               Gastrointestinal tract and appendix: Postsurgical changes 
of gastric       bypass. There are also anastomotic sutures in the pelvis 
suggesting       subsegmental colonic resection with a colorectal anastomosis. 
No       abnormal bowel distention and no significant colonic stool burden.     
 Appendix is within normal limits.               Abdominal vascular structures: 
Atherosclerotic plaques are scattered       throughout the abdominal aorta      
        Peritoneum and retroperitoneum: No free fluid or free air.  No omental  
    or mesenteric masses.  No abnormal lymph nodes.               
Musculoskeletal structures and abdominal wall: Mild degenerative       changes 
are present in the spine.     PAGE  1                       Signed Report       
            (CONTINUED)   Name: JOAN RUIZ                        [facility]     : 1955 Age/S: 64  / F         [address]           Unit #: [id number]     Loc:               [place] [address]  
             Phys: Arabella aHnnon MD                                            
      Acct: [id number]  Dis Date:               Status: REG ER                
                 PHONE #: [phone]     Exam Date: 2019  1055          
          FAX #: [phone]      Reason: epigastric abd pain, nausea/vomiting,
h/o ileus     EXAMS:                                               CPT CODE:    
 [id number] CT ABD PELVIS W/CONT                       [address]               
<Continued>                  IMPRESSION:         No acute intra-abdominal 
process.         Postsurgical changes of gastric bypass as well as findings 
suggesting         segmental colonic resection with colorectal anastomosis. No 
abnormal         distention of the bowel to suggest ileus or obstruction.       
           Location: [facility]          ** Electronically Signed by Alton Tobias MD on 
2019 at 1154 **                      Reported and signed by: Alton Tobias MD                              CC: Arabella Hannon MD; Jaydon De Anda III, MD                                                                              
            Technologist:Nanda Pina                     CTDI:        DLP:      
 Trnscb Date/Time: 2019 (1154) [email or web address]                       Orig Print
D/T: S: 2019 (7088)      PAGE  2                       Signed Report      
                                                     

 

                - XR CHEST 1 V  2019 10:57:00                   Name: JOAN BERKOWITZ                         

[facility]    : 1955 Age/S:64  /F            [address]           Unit#:[id number]     Loc: [place], [place] [address]               Phys: Arabella Hannon MD                                   
              Acct#: [id number] Dis Date:                   PHONE #: 
[phone]      Status: REG ER                                   FAX #: 
[phone]      Exam Date: 2019           Reason: epigastric abd pain   
                             EXAMS:                                             
 CPT CODE:      [id number] XR CHEST 1 V                               [address]194      
                     REASON FOR EXAM: epigastric abd pain               Exam 
Order Date: 12/3/2019 9:41 AM               Ordering M.D.: Arabella Hannon MD    
          PROCEDURE:  - XR CHEST 1 V               COMPARISON: Frontal chest x-
ray 2011               FINDINGS:         The lungs are clear.  
There is no pleural effusion or pneumothorax.       Pulmonary vascularity is 
within normal limits.               Cardiomediastinal silhouette is normal in 
size for technique. The       mediastinal contours are within normal limits. 
Mild atherosclerotic       disease is present in the aortic arch.               
Musculoskeletal structures are within normal limits.               The 
visualized upper abdomen is within normal limits.                         
IMPRESSION:         No acute cardiopulmonary process.                   
Location: [facility]          ** Electronically Signed by Alton Tobias MD on 2019 
at 1057 **                      Reported and signed by: Alton Tobias MD          
 CC: Arabella Hannon MD; Jaydon De Anda III, MD                              
                                                            Technologist: Nanda Pina                                            Trnscrpt Data: 2019 
(1057) [id number]                         Orig Print D/T: S: 2019 (7155)  
                      PAGE  1                       Signed Report               
                                                     

 

                    TROPONIN-I          2019 10:47:00   

 

                                        Test Item

 

             TROPONIN-I (test code = TROPI) <0.015 ng/mL 0.00-0.056   N         

    





COMPREHENSIVE METABOLIC [id number] 10:31:00* 



             Test Item    Value        Reference Range Interpretation Comments

 

             SODIUM (test code = NA) 137 mmol/L   136-145      N             

 

             POTASSIUM (test code = K) 4.2 mmol/L   3.5-5.1      N             

 

             CHLORIDE (test code = CL) 98 mmol/L    101-109      L             

 

             CARBON DIOXIDE (test code = CO2) 27.3 mmol/L  21-32        N       

      

 

             ANION GAP (test code = GAP) 16 mmol/L    10-20        N            

 

 

             GLUCOSE (test code = GLU) 113 mg/dL           H             

 

             BLOOD UREA NITROGEN (test code = BUN) 8 mg/dL      3-21         N  

           

 

             CREATININE (test code = CREAT) 0.69 mg/dL   0.55-1.3     N         

    

 

             BUN/CREATININE RATIO (test code = BUN/CREA) 11.6         10-20     

   N             

 

             TOTAL PROTEIN (test code = PROT) 8.5 g/dL     6.5-8.4      H       

      

 

             ALBUMIN (test code = ALB) 4.1 g/dL     3.4-4.8      N             

 

             GLOBULIN (test code = GLOB) 4.4 G/DL     1-10         N            

 

 

             ALBUMIN/GLOBULIN RATIO (test code = A/G) 0.93 RATIO   0.75-1.50    

N             

 

             CALCIUM (test code = CA) 8.7 mg/dL    8.4-10.2     N             

 

             BILIRUBIN TOTAL (test code = BILT) 0.60 mg/dL   0.0-1.0      N     

        

 

             SGOT/AST (test code = AST) 21 U/L       6-32         N             

 

             SGPT/ALT (test code = ALT) 19 U/L       12-78        N            N

ote: Change in REFERENCE RANGE due to

new reagent method.

 

             ALKALINE PHOSPHATASE TOTAL (test code = ALKP) 87 U/L         

     N             





[id number] 10:31:00* 



             Test Item    Value        Reference Range Interpretation Comments

 

             LIPASE (test code = LIP) 302 U/L      128-270      H             





URINALYSIS [id number] 10:28:00* 



             Test Item    Value        Reference Range Interpretation Comments

 

             UA COLOR (test code = COLU) YELLOW       YELLOW                    

 

 

             UA APPEARANCE (test code = APPU) HAZY         CLEAR        A       

      

 

             UA GLUCOSE DIPSTICK (test code = DGLUU) norm mg/dL   NEGATIVE      

             

 

             UA BILIRUBIN DIPSTICK (test code = BILU) NEGATIVE mg/dL NEGATIVE   

                

 

             UA KETONE DIPSTICK (test code = KETU) neg mg/dL    NEGATIVE        

           

 

             UA SPECIFIC GRAVITY (test code = SGU) 1.015        1.001-1.035     

           

 

             UA BLOOD DIPSTICK (test code = NESS) 10 (Trace) Lj/uL NEGATIVE     

A             

 

             UA PH DIPSTICK (test code = YUMI) 9.0          5.0-8.0      A       

      

 

             UA PROTEIN DIPSTICK (test code = PROU) neg mg/dL    Neg-15         

            

 

             UA UROBILINIOGEN DIPSTICK (test code = URO) norm mg/dL   0.0-0.2   

                 

 

             UA NITRITE DIPSTICK (test code = MARILU) POSITIVE     NEGATIVE       

            

 

                UA LEUKOCYTE ESTERASE DIPSTICK (test code = LEUU) 25 Catarina/uL (Tra

ce) uL NEGATIVE        

A                                        

 

             UA WBC (test code = WBCU) 0-5 per HPF  0-5                        

 

             UA RBC (test code = RBCU) 0-2 per HPF  0-5                        

 

             UA EPITHELIAL CELLS (test code = EPIU) RARE per HPF Few            

            

 

             UA BACTERIA (test code = BACU) MANY per HPF NONE                   

    

 

             UA AMORPHOUS SEDIMENT (test code = AMORU) MANY per LPF NONE        

               





Urine Source? Clean CatchCOMPREHENSIVE METABOLIC [id number] 10:20:00* 



             Test Item    Value        Reference Range Interpretation Comments

 

             SODIUM (test code = NA) 137 mmol/L   136-145      N             

 

             POTASSIUM (test code = K) 4.2 mmol/L   3.5-5.1      N             

 

             CHLORIDE (test code = CL) 98 mmol/L    101-109      L             

 

             CARBON DIOXIDE (test code = CO2) 27.3 mmol/L  21-32        N       

      

 

             ANION GAP (test code = GAP) 16 mmol/L    10-20        N            

 

 

             GLUCOSE (test code = GLU) 113 mg/dL           H             

 

             BLOOD UREA NITROGEN (test code = BUN) 8 mg/dL      3-21         N  

           

 

             CREATININE (test code = CREAT) 0.69 mg/dL   0.55-1.3     N         

    

 

             BUN/CREATININE RATIO (test code = BUN/CREA) 11.6         10-20     

   N             

 

             TOTAL PROTEIN (test code = PROT)  gram/dL     6.4-8.2              

      

 

             ALBUMIN (test code = ALB)  g/dL        3.4-5.0                    

 

             GLOBULIN (test code = GLOB)  g/dL        2.7-4.2                   

 

 

             ALBUMIN/GLOBULIN RATIO (test code = A/G)              0.75-1.50    

              

 

             CALCIUM (test code = CA) 8.7 mg/dL    8.4-10.2     N             

 

             BILIRUBIN TOTAL (test code = BILT)  mg/dL       0.2-1.2            

        

 

             SGOT/AST (test code = AST)  IUnit/L     15-37                      

 

             SGPT/ALT (test code = ALT)  U/L         10-69                      

 

             ALKALINE PHOSPHATASE TOTAL (test code = ALKP)  IUnit/L       

                   





[id number] 10:20:00* 



             Test Item    Value        Reference Range Interpretation Comments

 

             LIPASE (test code = LIP)  Unit/L      144-286                    





URINALYSIS [id number] 09:48:00* 



             Test Item    Value        Reference Range Interpretation Comments

 

             UA COLOR (test code = COLU) YELLOW       YELLOW                    

 

 

             UA APPEARANCE (test code = APPU)              CLEAR                

      

 

             UA GLUCOSE DIPSTICK (test code = DGLUU) norm mg/dL   NEGATIVE      

             

 

             UA BILIRUBIN DIPSTICK (test code = BILU) NEGATIVE mg/dL NEGATIVE   

                

 

             UA KETONE DIPSTICK (test code = KETU) neg mg/dL    NEGATIVE        

           

 

             UA SPECIFIC GRAVITY (test code = SGU) 1.015        1.001-1.035     

           

 

             UA BLOOD DIPSTICK (test code = NESS) 10 (Trace) Lj/uL NEGATIVE     

A             

 

             UA PH DIPSTICK (test code = YMUI) 9.0          5.0-8.0      A       

      

 

             UA PROTEIN DIPSTICK (test code = PROU) neg mg/dL    Neg-15         

            

 

             UA UROBILINIOGEN DIPSTICK (test code = URO) norm mg/dL   0.0-0.2   

                 

 

             UA NITRITE DIPSTICK (test code = MARILU) POSITIVE     NEGATIVE       

            

 

                UA LEUKOCYTE ESTERASE DIPSTICK (test code = LEUU) 25 Catarina/uL (Tra

ce) uL NEGATIVE        

A                                        

 

             UA WBC (test code = WBCU)  per HPF     0-5                        

 

             UA RBC (test code = RBCU)  per HPF     0-5                        

 

             UA EPITHELIAL CELLS (test code = EPIU)  per HPF     Few            

            

 

             UA BACTERIA (test code = BACU)  per HPF     NONE                   

    





Urine Source? Clean CatchCBC W/AUTO [id number] 09:46:00* 



             Test Item    Value        Reference Range Interpretation Comments

 

             WHITE BLOOD CELL (test code = WBC) 4.2 K/mm3    4.5-12.5     L     

        

 

             RED BLOOD CELL (test code = RBC) 4.96 mill/mm3 3.7-5.2      N      

       

 

             HEMOGLOBIN (test code = HGB) 13.0 gram/dL 11.5-15.5    N           

  

 

             HEMATOCRIT (test code = HCT) 41.1 %       36.0-46.0    N           

  

 

             MEAN CELL VOLUME (test code = MCV) 82.9 fL      80-98        N     

        

 

             MEAN CELL HGB (test code = MCH) 26.2 picogram 27.0-33.0    L       

      

 

             MEAN CELL HGB CONCETRATION (test code = MCHC) 31.6 gram/dL 33.0-36.

0    L             

 

             RED CELL DISTRIBUTION WIDTH (test code = RDW) 13.9 %       11.6-16.

2    N             

 

             RED CELL DISTRIBUTION WIDTH SD (test code = RDW-SD) 42.9 fL      37

.0-51.0    N             

 

             PLATELET COUNT (test code = PLT) 270 K/mm3    150-450      N       

      

 

             MEAN PLATELET VOLUME (test code = MPV) 10.0 fL      6.7-11.0     N 

            

 

             NEUTROPHIL % (test code = NT%) 60.2 %       39.0-69.0    N         

    

 

             LYMPHOCYTE % (test code = LY%) 29.8 %       25.0-55.0    N         

    

 

             MONOCYTE % (test code = MO%) 9.1 %        0.0-10.0     N           

  

 

             EOSINOPHIL % (test code = EO%) 0.2 %        0.0-5.0      N         

    

 

             BASOPHIL % (test code = BA%) 0.7 %        0.0-1.0      N           

  

 

             NEUTROPHIL # (test code = NT#) 2.52 K/mm3   1.8-7.7      N         

    

 

             LYMPHOCYTE # (test code = LY#) 1.25 K/mm3   1.0-5.0      N         

    

 

             MONOCYTE # (test code = MO#) 0.38 K/mm3   0-0.8        N           

  

 

             EOSINOPHIL # (test code = EO#) 0.01 K/mm3   0.0-0.5      N         

    

 

             BASOPHIL # (test code = BA#) 0.03 K/mm3   0.0-0.2      N           

  

 

             MANUAL DIFF REQUIRED (test code = MDIFF) NO                        

              





URINALYSIS [id number] 20:01:00* 



             Test Item    Value        Reference Range Interpretation Comments

 

             UA COLOR (test code = COLU) YELLOW       YELLOW                    

 

 

             UA APPEARANCE (test code = APPU) CLEAR        CLEAR                

      

 

             UA GLUCOSE DIPSTICK (test code = DGLUU) norm mg/dL   NEGATIVE      

             

 

             UA BILIRUBIN DIPSTICK (test code = BILU) NEGATIVE mg/dL NEGATIVE   

                

 

             UA KETONE DIPSTICK (test code = KETU) 5 (Trace) mg/dL NEGATIVE     

A             

 

             UA SPECIFIC GRAVITY (test code = SGU) 1.010        1.001-1.035     

           

 

             UA BLOOD DIPSTICK (test code = NESS) neg Lj/uL   NEGATIVE          

         

 

             UA PH DIPSTICK (test code = YUMI) 6.5          5.0-8.0              

      

 

             UA PROTEIN DIPSTICK (test code = PROU) neg mg/dL    Neg-15         

            

 

             UA UROBILINIOGEN DIPSTICK (test code = URO) norm mg/dL   0.0-0.2   

                 

 

             UA NITRITE DIPSTICK (test code = MARILU) NEGATIVE     NEGATIVE       

            

 

             UA LEUKOCYTE ESTERASE DIPSTICK (test code = LEUU) 100 Catarina/uL (1+) u

L NEGATIVE     A            



 

             UA WBC (test code = WBCU) 3-5 per HPF  0-5                        

 

             UA RBC (test code = RBCU) 0-2 per HPF  0-5                        

 

             UA EPITHELIAL CELLS (test code = EPIU) Few (2-5/hpf) per HPF Few   

                     

 

             UA BACTERIA (test code = BACU) FEW per HPF  NONE                   

    





Urine Source? Clean CatchBASIC METABOLIC [id number] 19:45:00* 



             Test Item    Value        Reference Range Interpretation Comments

 

             SODIUM (test code = NA) 132 mmol/L   135-148      L             

 

             POTASSIUM (test code = K) 4.1 mmol/L   3.5-5.1      N             

 

             CHLORIDE (test code = CL) 97 mmol/L    101-109      L             

 

             CARBON DIOXIDE (test code = CO2) 30.4 mmol/L  21-32        N       

      

 

             ANION GAP (test code = GAP) 9 mmol/L     10-20        L            

 

 

             GLUCOSE (test code = GLU) 84 mg/dL            N             

 

             BLOOD UREA NITROGEN (test code = BUN) 17 mg/dL     3-21         N  

           

 

             GLOMERULAR FILTRATION RATE (test code = GFR) > 60 mL/min  >=60     

                 Estimated GFR by

using Modified MDRD formula.Chronic kidney disease is defined as either kidney 
damageor GFR <60 mL/min/1.73 m2 for >3 months.

 

             CREATININE (test code = CREAT) 0.67 mg/dL   0.55-1.3     N         

    

 

             BUN/CREATININE RATIO (test code = BUN/CREA) 25.4         10-20     

   H             

 

             CALCIUM (test code = CA) 9.2 mg/dL    8.4-10.2     N             





HEPATIC FUNCTION [id number] 19:45:00* 



             Test Item    Value        Reference Range Interpretation Comments

 

             TOTAL PROTEIN (test code = PROT) 6.6 g/dL     6.5-8.4      N       

      

 

             ALBUMIN (test code = ALB) 3.3 g/dL     3.4-4.8      L             

 

             GLOBULIN (test code = GLOB) 3.3 G/DL     1-10         N            

 

 

             ALBUMIN/GLOBULIN RATIO (test code = A/G) 1.0 RATIO    0.75-1.50    

N             

 

             BILIRUBIN TOTAL (test code = BILT) 0.40 mg/dL   0.0-1.0      N     

        

 

             BILIRUBIN DIRECT (test code = BILD) 0.10 mg/dL   0.0-0.30     N    

         

 

             SGOT/AST (test code = AST) 27 U/L       6-32         N             

 

             SGPT/ALT (test code = ALT) 24 U/L       12-78        N            N

ote: Change in REFERENCE RANGE due to

new reagent method.

 

             ALKALINE PHOSPHATASE TOTAL (test code = ALKP) 90 U/L         

     N             





[id number] 19:45:00* 



             Test Item    Value        Reference Range Interpretation Comments

 

             LIPASE (test code = LIP) 271 U/L      128-270      H             





BASIC METABOLIC [id number] 19:35:00* 



             Test Item    Value        Reference Range Interpretation Comments

 

             SODIUM (test code = NA) 132 mmol/L   135-148      L             

 

             POTASSIUM (test code = K) 4.1 mmol/L   3.5-5.1      N             

 

             CHLORIDE (test code = CL) 97 mmol/L    101-109      L             

 

             CARBON DIOXIDE (test code = CO2) 30.4 mmol/L  21-32        N       

      

 

             ANION GAP (test code = GAP) 9 mmol/L     10-20        L            

 

 

             GLUCOSE (test code = GLU) 84 mg/dL            N             

 

             BLOOD UREA NITROGEN (test code = BUN) 17 mg/dL     3-21         N  

           

 

             GLOMERULAR FILTRATION RATE (test code = GFR) > 60 mL/min  >=60     

                 Estimated GFR by

using Modified MDRD formula.Chronic kidney disease is defined as either kidney 
damageor GFR <60 mL/min/1.73 m2 for >3 months.

 

             CREATININE (test code = CREAT) 0.67 mg/dL   0.55-1.3     N         

    

 

             BUN/CREATININE RATIO (test code = BUN/CREA) 25.4         10-20     

   H             

 

             CALCIUM (test code = CA) 9.2 mg/dL    8.4-10.2     N             





HEPATIC FUNCTION [id number] 19:35:00* 



             Test Item    Value        Reference Range Interpretation Comments

 

             TOTAL PROTEIN (test code = PROT)  gram/dL     6.4-8.2              

      

 

             ALBUMIN (test code = ALB)  g/dL        3.4-5.0                    

 

             GLOBULIN (test code = GLOB)  g/dL        2.7-4.2                   

 

 

             ALBUMIN/GLOBULIN RATIO (test code = A/G)              0.75-1.50    

              

 

             BILIRUBIN TOTAL (test code = BILT)  mg/dL       0.2-1.2            

        

 

             BILIRUBIN DIRECT (test code = BILD)  mg/dL       0.0-0.20          

         

 

             SGOT/AST (test code = AST)  IUnit/L     15-37                      

 

             SGPT/ALT (test code = ALT)  U/L         10-69                      

 

             ALKALINE PHOSPHATASE TOTAL (test code = ALKP)  IUnit/L       

                   





[id number] 19:35:00* 



             Test Item    Value        Reference Range Interpretation Comments

 

             LIPASE (test code = LIP)  Unit/L      144-286                    





CBC W/O [id number] 19:24:00* 



             Test Item    Value        Reference Range Interpretation Comments

 

             WHITE BLOOD CELL (test code = WBC) 5.2 K/mm3    4.5-12.5     N     

        

 

             RED BLOOD CELL (test code = RBC) 4.54 mill/mm3 3.7-5.2      N      

       

 

             HEMOGLOBIN (test code = HGB) 12.1 gram/dL 11.5-15.5    N           

  

 

             HEMATOCRIT (test code = HCT) 37.9 %       36.0-46.0    N           

  

 

             MEAN CELL VOLUME (test code = MCV) 83.5 fL      80-98        N     

        

 

             MEAN CELL HGB (test code = MCH) 26.7 picogram 27.0-33.0    L       

      

 

             MEAN CELL HGB CONCETRATION (test code = MCHC) 31.9 gram/dL 33.0-36.

0    L             

 

             RED CELL DISTRIBUTION WIDTH (test code = RDW) 13.7 %       11.6-16.

2    N             

 

             RED CELL DISTRIBUTION WIDTH SD (test code = RDW-SD) 42.3 fL      37

.0-51.0    N             

 

             PLATELET COUNT (test code = PLT) 254 K/mm3    150-450      N       

      

 

             MEAN PLATELET VOLUME (test code = MPV) 9.6 fL       6.7-11.0     N 

            





- CT ABD PELVIS [id number] 11:21:00  Name: JOAN RUIZ                     
  [facility]     : 1955 Age/S: 64  / F         [address]
[facility]           Unit #: [id number]     Loc:               [place] [address]                Phys: Jaxon Leyva MD                             
                 Acct: [id number]  Dis Date:               Status: REG ER     
                            PHONE #: [phone]     Exam Date: 2019  
1100                     FAX #: [phone]      Reason: upper abdominal pain, 
hx obstructions               EXAMS:                                            
  CPT CODE:      [id number] CT ABD PELVIS W/CONT                       90691     
              TECHNIQUE:        - CT ABD PELVIS W/CONT .                 This 
exam was performed using one or more of the following dose       reduction 
techniques: Automated exposure control, adjustment of the mA       and/ or kV 
according to patient size or use of iterative       reconstruction technique.   
           COMPARISON: CT abdomen and pelvis 2018               HISTORY:   
   64 years Female         upper abdominal pain, hx obstructions                
                       FINDINGS:       CT ABDOMEN:               Included lung 
bases and visualized lower mediastinum: Lower lung field       atelectasis.. No 
significant abnormality in the visualized lower       mediastinum.              
Liver: Normal in size and density.  No focal lesions.               Gallbladder 
and biliary ducts: Cholecystectomy. No intra-or       extrahepatic biliary 
ductal dilatation.               Spleen: Normal in size and density.  No focal 
lesions.               Adrenals: No adrenal nodules or enlargement.             
 Pancreas: No focal lesion, calcifications, pancreatic duct dilatation,       or
peripancreatic fluid collections.               Right kidney: Normal in position
and size.   No stones.   No       hydronephrosis.  No focal lesions.            
  Left kidney:  Normal in position and size.  No stones.  No       
hydronephrosis.  Sub-5 mm hypodensities left kidney too small to       
characterize.               GI structures: No small or large bowel dilatation.  
No small or large       bowel wall thickening.    Prior surgical changes 
involving multiple     PAGE  1                       Signed Report              
     (CONTINUED)   Name: JOAN RUIZ                        [facility]     : 1955 Age/S: 64  / F         [address] [facility]       
   Unit #: [id number]     Loc:               [place] [address]                
Phys: Jaxon Leyva MD                                               Acct: 
[id number]  Dis Date:               Status: REG ER                            
     PHONE #: [phone]     Exam Date: 2019  1100                     
FAX #: [phone]      Reason: upper abdominal pain, hx obstructions          
    EXAMS:                                               CPT CODE:      
[id number] CT ABD PELVIS W/CONT                       [address]               <
Continued>        loops of bowel ,no evidence of obstruction.               
Retroperitoneum and mesentery: No significant lymphadenopathy.   No       free 
fluid.   No free air.  No mesenteric abnormalities.   No       retroperitoneal 
abnormality.                  Abdominal wall: No abdominal wall hernia.         
     Vascular structures: Age appropriate.  No aneurysm.                Osseous 
structures: Age appropriate.               Soft tissues and musculoskeletal 
structures: No significant findings.                        CT PELVIS:       The
urinary bladder is partially distended and appears unremarkable       from this 
exam.               No abnormalities in pelvic viscera.               No 
significant lymphadenopathy.      No free fluid.  No inflammatory       changes.
 No inguinal abnormalities.                          IMPRESSION:         Prior 
surgical changes involving multiple loops of bowel ,no evidence         of 
obstruction.         Cholecystectomy.         Sub-5 mm hypodensities left kidney
too small to characterize.                               ** Electronically 
Signed by Adam Kelly M.D. on 2019 at 1121 **                      Reported
and signed by: Adam Kelly M.D.      CC: Jaxon Leyva MD; Dom Chávez    
                                                                                
              Technologist:GIRISH GERARDO, RT(R),CT       CTDI:        DLP:    
   Trnscb Date/Time: 2019 (1121) t.SDR.[email or web address]                        Orig 
Print D/T: S: 2019 (8722)      PAGE  2                       Signed Report
                              BASIC METABOLIC [id number] 10:16:00* 



             Test Item    Value        Reference Range Interpretation Comments

 

             SODIUM (test code = NA) 134 mmol/L   136-145      L             

 

             POTASSIUM (test code = K) 3.9 mmol/L   3.5-5.1      N             

 

             CHLORIDE (test code = CL) 98 mmol/L    101-109      L             

 

             CARBON DIOXIDE (test code = CO2) 25.2 mmol/L  21-32        N       

      

 

             ANION GAP (test code = GAP) 15 mmol/L    10-20        N            

 

 

             GLUCOSE (test code = GLU) 133 mg/dL           H             

 

             BLOOD UREA NITROGEN (test code = BUN) 15 mg/dL     3-21         N  

           

 

             GLOMERULAR FILTRATION RATE (test code = GFR) > 60 mL/min  >=60     

                 Estimated GFR by

using Modified MDRD formula.Chronic kidney disease is defined as either kidney 
damageor GFR <60 mL/min/1.73 m2 for >3 months.

 

             CREATININE (test code = CREAT) 0.74 mg/dL   0.55-1.3     N         

    

 

             BUN/CREATININE RATIO (test code = BUN/CREA) 20.3         10-20     

   H             

 

             CALCIUM (test code = CA) 9.0 mg/dL    8.4-10.2     N             





HEPATIC FUNCTION [id number] 10:16:00* 



             Test Item    Value        Reference Range Interpretation Comments

 

             TOTAL PROTEIN (test code = PROT) 8.2 g/dL     6.5-8.4      N       

      

 

             ALBUMIN (test code = ALB) 3.8 g/dL     3.4-4.8      N             

 

             GLOBULIN (test code = GLOB) 4.4 G/DL     1-10         N            

 

 

             ALBUMIN/GLOBULIN RATIO (test code = A/G) 0.86 RATIO   0.75-1.50    

N             

 

             BILIRUBIN TOTAL (test code = BILT) 1.00 mg/dL   0.0-1.0      N     

        

 

             BILIRUBIN DIRECT (test code = BILD) 0.30 mg/dL   0.0-0.30     N    

         

 

             SGOT/AST (test code = AST) 23 U/L       6-32         N             

 

             SGPT/ALT (test code = ALT) 18 U/L       12-78        N            N

ote: Change in REFERENCE RANGE due to

new reagent method.

 

             ALKALINE PHOSPHATASE TOTAL (test code = ALKP) 109 U/L        

     N             





[id number] 10:16:00* 



             Test Item    Value        Reference Range Interpretation Comments

 

             LIPASE (test code = LIP) 200 U/L      128-270      N             





URINALYSIS [id number] 10:04:00* 



             Test Item    Value        Reference Range Interpretation Comments

 

             UA COLOR (test code = COLU) YELLOW       YELLOW                    

 

 

             UA APPEARANCE (test code = APPU) CLEAR        CLEAR                

      

 

             UA GLUCOSE DIPSTICK (test code = DGLUU) norm mg/dL   NEGATIVE      

             

 

             UA BILIRUBIN DIPSTICK (test code = BILU) 1 mg/dL      NEGATIVE     

A             

 

             UA KETONE DIPSTICK (test code = KETU) 50 (2+) mg/dL NEGATIVE     A 

            

 

             UA SPECIFIC GRAVITY (test code = SGU) 1.020        1.001-1.035     

           

 

             UA BLOOD DIPSTICK (test code = NESS) 25 (1+) Lj/uL NEGATIVE     A  

           

 

             UA PH DIPSTICK (test code = YUMI) 6.0          5.0-8.0              

      

 

             UA PROTEIN DIPSTICK (test code = PROU) 30 (1+) mg/dL Neg-15       A

             

 

             UA UROBILINIOGEN DIPSTICK (test code = URO) norm mg/dL   0.0-0.2   

                 

 

             UA NITRITE DIPSTICK (test code = MARILU) NEGATIVE     NEGATIVE       

            

 

             UA LEUKOCYTE ESTERASE DIPSTICK (test code = LEUU) 500 Catar[place]/uL (3+) u

L NEGATIVE     A            



 

             UA WBC (test code = WBCU) 20-30 per HPF 0-5          A             

 

             UA RBC (test code = RBCU) 0-3 per HPF  0-5                        

 

             UA EPITHELIAL CELLS (test code = EPIU) None seen per HPF Few       

                 

 

             UA BACTERIA (test code = BACU) FEW per HPF  NONE                   

    





Urine Source? Clean CatchCBC W/O [id number] 10:01:00* 



             Test Item    Value        Reference Range Interpretation Comments

 

             WHITE BLOOD CELL (test code = WBC) 7.4 K/mm3    4.5-12.5     N     

        

 

             RED BLOOD CELL (test code = RBC) 5.41 mill/mm3 3.7-5.2      H      

       

 

             HEMOGLOBIN (test code = HGB) 14.1 gram/dL 11.5-15.5    N           

  

 

             HEMATOCRIT (test code = HCT) 44.0 %       36.0-46.0    N           

  

 

             MEAN CELL VOLUME (test code = MCV) 81.3 fL      80-98        N     

        

 

             MEAN CELL HGB (test code = MCH) 26.1 picogram 27.0-33.0    L       

      

 

             MEAN CELL HGB CONCETRATION (test code = MCHC) 32.0 gram/dL 33.0-36.

0    L             

 

             RED CELL DISTRIBUTION WIDTH (test code = RDW) 13.8 %       11.6-16.

2    N             

 

             RED CELL DISTRIBUTION WIDTH SD (test code = RDW-SD) 41.5 fL      37

.0-51.0    N             

 

             PLATELET COUNT (test code = PLT) 332 K/mm3    150-450      N       

      

 

             MEAN PLATELET VOLUME (test code = MPV) 9.7 fL       6.7-11.0     N 

            





URINALYSIS [id number] 10:01:00* 



             Test Item    Value        Reference Range Interpretation Comments

 

             UA COLOR (test code = COLU) YELLOW       YELLOW                    

 

 

             UA APPEARANCE (test code = APPU) CLEAR        CLEAR                

      

 

             UA GLUCOSE DIPSTICK (test code = DGLUU) norm mg/dL   NEGATIVE      

             

 

             UA BILIRUBIN DIPSTICK (test code = BILU) 1 mg/dL      NEGATIVE     

A             

 

             UA KETONE DIPSTICK (test code = KETU) 50 (2+) mg/dL NEGATIVE     A 

            

 

             UA SPECIFIC GRAVITY (test code = SGU) 1.020        1.001-1.035     

           

 

             UA BLOOD DIPSTICK (test code = NESS) 25 (1+) Lj/uL NEGATIVE     A  

           

 

             UA PH DIPSTICK (test code = YUMI) 6.0          5.0-8.0              

      

 

             UA PROTEIN DIPSTICK (test code = PROU) 30 (1+) mg/dL Neg-15       A

             

 

             UA UROBILINIOGEN DIPSTICK (test code = URO) norm mg/dL   0.0-0.2   

                 

 

             UA NITRITE DIPSTICK (test code = MARILU) NEGATIVE     NEGATIVE       

            

 

             UA LEUKOCYTE ESTERASE DIPSTICK (test code = LEUU) 500 Catarina/uL (3+) u

L NEGATIVE     A            



 

             UA WBC (test code = WBCU)  per HPF     0-5                        

 

             UA RBC (test code = RBCU)  per HPF     0-5                        

 

             UA EPITHELIAL CELLS (test code = EPIU)  per HPF     Few            

            

 

             UA BACTERIA (test code = BACU)  per HPF     NONE                   

    





Urine Source? Clean CatchCHEST 2 [id number] 17:52:00                       
                                                              [place] [address]      Patient Name: JOAN RUIZ                             
     MR #: [id number]                     : 1955                       
           Age/Sex: 63/F  Acct #: [id number]                              Req 
#: [id number]  Adm Physician:                                                   
  Ordered by: MARGA PATRICIA NP                            Report #: [id number]  
     Location: ER                                      Room/Bed:                
    ____________________________________________________
_______________________________________________    Procedure: [id number] DX/CHEST
2 VIEWS  Exam Date: 19                            Exam Time:          
                                    REPORT STATUS: Signed    EXAMINATION:  CHEST
2 VIEWS          INDICATION:      Cough      cough    2019       
COMPARISON:  None           FINDINGS:   TUBES and LINES:  None.      LUNGS:  Sylvain
gs are well inflated.  Perihilar peribronchial hazy opacity could be   due to br
onchitis.   There is no evidence of pneumonia or pulmonary edema.      PLEURA:  
No pleural effusion or pneumothorax.      HEART AND MEDIASTINUM:  The cardiomedi
astinal silhouette is unremarkable.          BONES AND SOFT TISSUES:  No acute o
sseous lesion.  Soft tissues are   unremarkable.      UPPER ABDOMEN: No free air
under the diaphragm.          IMPRESSION:    Perihilar peribronchial hazy opaci
ty could be due to bronchitis.         Signed by: Dr. Quiana Mitchell M.D. on 3/22/
2019 5:53 PM        Dictated By: QUIANA MITCHELL MD, MD  Electronically Signed By: 
QUIANA MITCHELL MD, MD on 19  Transcribed By: KACEY on 19  
    COPY TO:   MARGA PATRICIA NP         Influenza Virus Types A,B Antigen
2019 17:41:00* 



             Test Item    Value        Reference Range Interpretation Comments

 

             Influenza Virus Types A,B Antigen (test code = 67653-8) NEGATIVE   

  NEGATIVE                   





[id number] 14:36:00   
                                                                                
 [facility]                        [address] [place] [address]      Patient Name: JOAN RUIZ               
                   MR #: [id number]                     : 1955         
                         Age/Sex: 63/F  Acct #: [id number]                    
         Req #: [id number]  Adm Physician:                                      
               Ordered by: LUCIUS CORBIN NP                            
Report #: [id number]        Location: ER                                      
Room/Bed:                     _________________________________________________
__________________________________________________    Procedure: [id number] DX/WR
IST COMPLETE LEFT  Exam Date: 19                            Exam Time: 140
5                                              REPORT STATUS: Signed    Exam: Le
ft wrist radiographs-3 views      Indication: Fall with pain in the left wrist. 
    Comparison: None.      Findings:     Diffuse osteopenia. No evidence of acu
te fracture, malalignment, or soft tissue   abnormality. Moderate degenerative c
hanges at the first carpometacarpal joint   with joint space narrowing..      Im
pression:   No acute radiographic abnormality.      Diffuse osteopenia.      Sig
donnie by: Dr. Geno Posey MD on 2019 2:38 PM        Dictated By: GENO POSEY MD
 Electronically Signed By: GENO POSEY MD on 19 143  Transcribed By: MIRIAM HIRSCH on 19 1438       COPY TO:   LUCIUS CORBIN NP        CT CERVICAL 
SPINE  14:25:00                                                     
                                [place] [address]      Patient 
Name: JOAN RUIZ                                   MR #: [id number]             
       : 1955                                   Age/Sex: 63/F  Acct #: 
[id number]                              Req #: [id number]  Adm Physician:     
                                                Ordered by: LUCIUS CORBIN NP
                           Report #: [id number]        Location: ER              
                       Room/Bed:                     
_________________________________________________
__________________________________________________    Procedure: [id number] CT/CT
CERVICAL SPINE WO  Exam Date: 19                            Exam Time: 13
45                                              REPORT STATUS: Signed    Exam: H
ead and cervical spine CTs without contrast   Indication: Fall presumably from s
tanding, hit head and passed out   Comparisons: Head and cervical spine CTs .       Technique:   Axial images were obtained from the brain and cervical
spine.   Coronal and sagittal images reconstructed from the axial data.   Dose 
modulation, iterative reconstruction, and/or weight based adjustment    of the m
A/kV was utilized to reduce the radiation dose to as low as reasonably    achiev
able.      Intravenous contrast: None      Findings:      Head CT:      Scalp/sk
ull:    No abnormalities. No fractures, blastic or lytic lesions.      Brain sul
ci: Appropriate for age.   Ventricles: Normal in size and configuration. No hydr
ocephalus.      Extra-axial spaces:    No masses.  No fluid collections.      Pa
renchyma:    Few hypodensities of the periventricular and deep white matter, non
specific   and most commonly seen in mild chronic microvascular ischemic change
s.    No masses, hemorrhage, acute or chronic cortical vascular insults.      Se
llar/suprasellar region: No abnormalities.   Craniocervical junction: Patent for
amen magnum.  No Chiari one malformation.      Cervical spine CT:      Airway: P
atent.      Fractures: None.   Soft tissues: No gross abnormalities.      Atlant
oaxial articulation: Intact.   Alignment: Normal lordosis. No scoliosis.   Cervi
comedullary junction: No abnormalities. Patent foramen magnum.      Vertebrae:  
 No infection or neoplasm.      Degenerative changes:    Moderate right foramin
al stenosis at C5-C6.   Mild to moderate canal stenosis at C6-C7.   Otherwise, n
o significant degenerative changes.      IMPRESSION:      Head CT:   1.  No acut
e abnormality.   2.  No changes when compared to head CT dated 2016.      C
ervical spine CT:   1.  No acute  abnormalities.   2.  Cannot adequately evaluat
e for ligament, spinal cord and or vascular   abnormalities.   3.  Bilateral pre
dominant cervical spondylosis from C5 to C7.      A preliminary report was provi
ded by Dr. Silva on 2019 2:31 PM.      Signed by: DR Elie Villa M.D. on 2019 3:34 PM        Dictated By: ELIE WOOD MD  Electronical
ly Signed By: ELIE WOOD MD on 19 153  Transcribed By: KACEY on
19 1534       COPY TO:   LUCIUS CORBIN NP        CT BRAIN [id number] 
14:25:00                                                                        
             [place] [address]      Patient Name: JOAN RUIZ   
                               MR #: [id number]                     : 
1955                                   Age/Sex: 63/F  Acct #: [id number]
                             Req #: [id number]  Adm Physician:                  
                                   Ordered by: LUCIUS CORBIN NP             
              Report #: [id number]        Location: ER                           
          Room/Bed:                     
_________________________________________________
__________________________________________________    Procedure: [id number] CT/CT
BRAIN WO  Exam Date: 19                            Exam Time: 1345        
                                     REPORT STATUS: Signed    Exam: Head and c
ervical spine CTs without contrast   Indication: Fall presumably from standing, 
hit head and passed out   Comparisons: Head and cervical spine CTs 2016.   
   Technique:   Axial images were obtained from the brain and cervical spine.   
Coronal and sagittal images reconstructed from the axial data.   Dose modulatio
n, iterative reconstruction, and/or weight based adjustment    of the mA/kV was 
utilized to reduce the radiation dose to as low as reasonably    achievable.    
 Intravenous contrast: None      Findings:      Head CT:      Scalp/skull:    No
abnormalities. No fractures, blastic or lytic lesions.      Brain sulci: Appro
priate for age.   Ventricles: Normal in size and configuration. No hydrocephalus
.      Extra-axial spaces:    No masses.  No fluid collections.      Parenchyma:
   Few hypodensities of the periventricular and deep white matter, non specific 
 and most commonly seen in mild chronic microvascular ischemic changes.    No 
masses, hemorrhage, acute or chronic cortical vascular insults.      Sellar/supr
asellar region: No abnormalities.   Craniocervical junction: Patent foramen magn
um.  No Chiari one malformation.      Cervical spine CT:      Airway: Patent.   
  Fractures: None.   Soft tissues: No gross abnormalities.      Atlantoaxial ar
ticulation: Intact.   Alignment: Normal lordosis. No scoliosis.   Cervicomedulla
ry junction: No abnormalities. Patent foramen magnum.      Vertebrae:    No infe
ction or neoplasm.      Degenerative changes:    Moderate right foraminal stenos
is at C5-C6.   Mild to moderate canal stenosis at C6-C7.   Otherwise, no signifi
cant degenerative changes.      IMPRESSION:      Head CT:   1.  No acute abnorma
lity.   2.  No changes when compared to head CT dated 2016.      Cervical s
pine CT:   1.  No acute  abnormalities.   2.  Cannot adequately evaluate for lig
ament, spinal cord and or vascular   abnormalities.   3.  Bilateral predominant 
cervical spondylosis from C5 to C7.      A preliminary report was provided by Dr Juan A Silva on 2019 2:31 PM.      Signed by: DR Elie Villa M.D. on  3:34 PM        Dictated By: ELIE WOOD MD  Electronically Signed
By: ELIE WOOD MD on 19 1534  Transcribed By: KACEY on 19 
1534       COPY TO:   LUCIUS CORBIN NP        HIP LEFT 2-3 VW (+/- PELVIS)
2019 14:18:00                                                             
                        [place] [address]      Patient Name: 
JOAN RUIZ                                   MR #: [id number]                   
 : 1955                                   Age/Sex: 63/F  Acct #: 
[id number]                              Req #: [id number]  Adm Physician:     
                                                Ordered by: LUCIUS CORBIN NP
                           Report #: [id number]        Location: ER              
                       Room/Bed:                     
_________________________________________________
__________________________________________________    Procedure: [id number] DX/HI
P LEFT 2-3 VW (+/- PELVIS)  Exam Date:                             Exam Time:   
                                           REPORT STATUS: Signed       Exam:  L
eft hip radiographs- 2 views; AP radiograph of the pelvis - 1 view      Indicati
on: Status post fall with pain to left hip.       Comparison: None.      Finding
s:    Left hip:    No evidence of acute fracture or malalignment.      Pelvis:  
No evidence of acute fracture or malalignment. Mild degenerative changes of   b
ilateral hips and pubic symphysis.      Surgical clips project over the lower ab
domen.      Impression:   No acute radiographic abnormality.         Signed by: 
Dr. Geno Posey MD on 2019 2:20 PM        Dictated By: GENO POSEY MD  Electr
onically Signed By: GENO POSEY MD on 19 1420  Transcribed By: KACEY on 0
19 1420       COPY TO:   LUCIUS CORBIN NP        Troponin  
05:02:00* 



             Test Item    Value        Reference Range Interpretation Comments

 

             Troponin I (test code = CLE7519) -0.001       0-0.300              

      





[place] [id number] 05:02:00* 



             Test Item    Value        Reference Range Interpretation Comments

 

             Troponin I (test code = FBX5352) < 0.001      0-0.300              

      





[place] [id number] 05:02:00* 



             Test Item    Value        Reference Range Interpretation Comments

 

             Troponin I (test code = HIN9264) < 0.001      0-0.300              

      





[facility]2018-05-28 04:28:00* 



             Test Item    Value        Reference Range Interpretation Comments

 

             Sodium Level (test code = 2951-2) 137          136-145             

       





[facility]2018-05-28 04:28:00* 



             Test Item    Value        Reference Range Interpretation Comments

 

             Potassium Level (test code = 2823-3) 4.7          3.5-5.1          

          





[facility] CenterChlor[facility] [id number] 04:28:00* 



             Test Item    Value        Reference Range Interpretation Comments

 

             Chloride Level (test code = 2075-0) 106                      

         





[facility] CenterCarbon Dioxide [id number] 04:28:00* 



             Test Item    Value        Reference Range Interpretation Comments

 

             Carbon Dioxide Level (test code = 2028-9) 21           22-29       

 L             





[facility] CenterAnion [id number] 04:28:00* 



             Test Item    Value        Reference Range Interpretation Comments

 

             Anion Gap (test code = 33037-3) 14.7         8-16                  

     





[facility] CenterBlood Urea [id number] 04:28:00* 



             Test Item    Value        Reference Range Interpretation Comments

 

             Blood Urea Nitrogen (test code = 3094-0) 10           7-26         

              





[facility] CenterCreatinine2018-05-28 04:28:00* 



             Test Item    Value        Reference Range Interpretation Comments

 

             Creatinine (test code = 2160-0) 0.69         0.57-1.11             

     





[facility] CenterBUN/Creatinine [id number] 04:28:00* 



             Test Item    Value        Reference Range Interpretation Comments

 

             BUN/Creatinine Ratio (test code = 3097-3) 14           6-25        

               





[facility] Medical CenterEstimat Glomerular Filtration Rate
2018 04:28:00* 



             Test Item    Value        Reference Range Interpretation Comments

 

             Estimat Glomerular Filtration Rate (test code = 70742-0) 60-       

   >60                        





Ranges were taken from the National Kidney Disease Education Program and the [facility] [employer] literature.Reference ranges:60 or greater: Utqsno55-05 (
for 3 consecutive months): Chronic kidney disease 15 or less: Kidney failureBaylor Scott & White McLane Children's Medical CenterGlucose [id number] 04:28:00* 



             Test Item    Value        Reference Range Interpretation Comments

 

             Glucose Level (test code = UAV8276) 94                       

         





[facility] CenterCalcium Sfdqs2967-98-94 04:28:00* 



             Test Item    Value        Reference Range Interpretation Comments

 

             Calcium Level (test code = 20939-1) 9.0          8.4-10.2          

         





[facility] Medical CenterTotal Ivqnkqjmy4731-68-90 04:28:00* 



             Test Item    Value        Reference Range Interpretation Comments

 

             Total Bilirubin (test code = 1975-2) 0.6          0.2-1.2          

          





[facility] Medical CenterAspartate Amino Transf (AST/SGOT)
2018 04:28:00* 



             Test Item    Value        Reference Range Interpretation Comments

 

                                        Aspartate Amino Transf (AST/SGOT) (test 

code = Aspartate Amino Transf 

(AST/SGOT))     23              5-34                             





[facility] Medical CenterAlanine Aminotransferase (ALT/SGPT)
2018 04:28:00* 



             Test Item    Value        Reference Range Interpretation Comments

 

             Alanine Aminotransferase (ALT/SGPT) (test code = 1742-6) 13        

   0-55                       





[facility] Medical CenterTotal Vzlskzm9445-69-99 04:28:00* 



             Test Item    Value        Reference Range Interpretation Comments

 

             Total Protein (test code = 2885-2) 7.5          6.5-8.1            

        





[facility] CenterAlbumin2018-05-28 04:28:00* 



             Test Item    Value        Reference Range Interpretation Comments

 

             Albumin (test code = 1751-7) 3.8          3.5-5.0                  

  





[facility] CenterGlobulin2018-05-28 04:28:00* 



             Test Item    Value        Reference Range Interpretation Comments

 

             Globulin (test code = 80606-2) 3.7          2.3-3.5      H         

    





[facility] CenterAlbumin/Globulin Kvsgw3804-86-02 04:28:00
  * 



             Test Item    Value        Reference Range Interpretation Comments

 

             Albumin/Globulin Ratio (test code = 1759-0) 1.0          0.8-2.0   

                 





[facility] CenterAlkaline Qbgkiwimsns6195-83-74 04:28:00* 



             Test Item    Value        Reference Range Interpretation Comments

 

             Alkaline Phosphatase (test code = 6768-6) 90                 

               





[facility] CenterCreatine Ezewkb8896-86-04 04:28:00* 



             Test Item    Value        Reference Range Interpretation Comments

 

             Creatine Kinase (test code = 2157-6) 67                      

          





[facility] CenterCreatine Kinase [id number] 04:28:00* 



             Test Item    Value        Reference Range Interpretation Comments

 

             Creatine Kinase MB (test code = 56073-3) 0.70         0-5.0        

              





[facility] Medical CenterAmylase Udoqa7390-49-24 04:28:00* 



             Test Item    Value        Reference Range Interpretation Comments

 

             Amylase Level (test code = 1798-8) 68                        

        





[facility] CenterLipase2018-05[facility] 04:28:00* 



             Test Item    Value        Reference Range Interpretation Comments

 

             Lipase (test code = 3040-3) 37           8-78                      

 





[facility]odium Vpwvp6027-08-34 04:28:00* 



             Test Item    Value        Reference Range Interpretation Comments

 

             Sodium Level (test code = 2951-2) 137          136-145             

       





[facility] CenterPotassium Lngsg1200-89-13 04:28:00* 



             Test Item    Value        Reference Range Interpretation Comments

 

             Potassium Level (test code = 2823-3) 4.7          3.5-5.1          

          





[facility] CenterChloride Pgjwb9841-09-62 04:28:00* 



             Test Item    Value        Reference Range Interpretation Comments

 

             Chloride Level (test code = 2075-0) 106                      

         





[facility] CenterCarbon Dioxide Owtsp4096-99-96 04:28:00* 



             Test Item    Value        Reference Range Interpretation Comments

 

             Carbon Dioxide Level (test code = 2028-9) 21           22-29       

 L             





[facility] CenterAnion Efs7020-05-87 04:28:00* 



             Test Item    Value        Reference Range Interpretation Comments

 

             Anion Gap (test code = 33037-3) 14.7         8-16                  

     





[facility] CenterBlood Urea Wvwullti4234-50-10 04:28:00* 



             Test Item    Value        Reference Range Interpretation Comments

 

             Blood Urea Nitrogen (test code = 3094-0) 10           7-26         

              





[facility] CenterCreatinine2018-05-28 04:28:00* 



             Test Item    Value        Reference Range Interpretation Comments

 

             Creatinine (test code = 2160-0) 0.69         0.57-1.11             

     





[facility] CenterBUN/Creatinine Osbjc0631-18-53 04:28:00* 



             Test Item    Value        Reference Range Interpretation Comments

 

             BUN/Creatinine Ratio (test code = 3097-3) 14           6-25        

               





[facility] CenterEstimat Glomerular Filtration Rate
2018 04:28:00* 



             Test Item    Value        Reference Range Interpretation Comments

 

             Estimat Glomerular Filtration Rate (test code = 091821231) > 60    

     >60                        





Ranges were taken from the National Kidney Disease Education Program and the [facility] Kidney Foundation literature.Reference ranges:60 or greater: [id number] (
for 3 consecutive months): Chronic kidney disease 15 or less: Kidney failureBaylor Scott & White McLane Children's Medical CenterGlucose Mqhsd8736-65-98 04:28:00* 



             Test Item    Value        Reference Range Interpretation Comments

 

             Glucose Level (test code = GFB8420) 94                       

         





[facility] CenterCalcium Xrqns0401-50-54 04:28:00* 



             Test Item    Value        Reference Range Interpretation Comments

 

             Calcium Level (test code = 31655-4) 9.0          8.4-10.2          

         





[facility] CenterTotal Sbuwjafzg7645-27-45 04:28:00* 



             Test Item    Value        Reference Range Interpretation Comments

 

             Total Bilirubin (test code = 1975-2) 0.6          0.2-1.2          

          





[facility] CenterAspartate Amino Transf (AST/SGOT)
2018 04:28:00* 



             Test Item    Value        Reference Range Interpretation Comments

 

                                        Aspartate Amino Transf (AST/SGOT) (test 

code = Aspartate Amino Transf 

(AST/SGOT))     23              5-34                             





[facility] CenterAlanine Aminotransferase (ALT/SGPT)
2018 04:28:00* 



             Test Item    Value        Reference Range Interpretation Comments

 

             Alanine Aminotransferase (ALT/SGPT) (test code = 1742-6) 13        

   0-55                       





[facility] CenterTotal Loxdobp1022-54-71 04:28:00* 



             Test Item    Value        Reference Range Interpretation Comments

 

             Total Protein (test code = 2885-2) 7.5          6.5-8.1            

        





[facility] CenterAlbumin2018-05-28 04:28:00* 



             Test Item    Value        Reference Range Interpretation Comments

 

             Albumin (test code = 1751-7) 3.8          3.5-5.0                  

  





[facility] CenterGlobulin2018-05-28 04:28:00* 



             Test Item    Value        Reference Range Interpretation Comments

 

             Globulin (test code = 46622-9) 3.7          2.3-3.5      H         

    





[facility] CenterAlbumin/Globulin Semnx9327-75-93 04:28:00
  * 



             Test Item    Value        Reference Range Interpretation Comments

 

             Albumin/Globulin Ratio (test code = 1759-0) 1.0          0.8-2.0   

                 





[facility] CenterAlkaline Lrrgixuwykm7473-64-18 04:28:00* 



             Test Item    Value        Reference Range Interpretation Comments

 

             Alkaline Phosphatase (test code = 6768-6) 90                 

               





[facility] CenterCreatine Efvcnl1513-40-32 04:28:00* 



             Test Item    Value        Reference Range Interpretation Comments

 

             Creatine Kinase (test code = 2157-6) 67                      

          





[facility] CenterCreatine Kinase DS1463-86-00 04:28:00* 



             Test Item    Value        Reference Range Interpretation Comments

 

             Creatine Kinase MB (test code = 71576-3) 0.70         0-5.0        

              





[facility] CenterAmylase Iomhl7109-27-32 04:28:00* 



             Test Item    Value        Reference Range Interpretation Comments

 

             Amylase Level (test code = 1798-8) 68                        

        





[facility] CenterLipase2018-05-28 04:28:00* 



             Test Item    Value        Reference Range Interpretation Comments

 

             Lipase (test code = 3040-3) 37           8-78                      

 





[facility] Centerodium Axbzv8574-47-46 04:28:00* 



             Test Item    Value        Reference Range Interpretation Comments

 

             Sodium Level (test code = 2951-2) 137          136-145             

       





[facility] CenterPotassium Zykez9101-79-17 04:28:00* 



             Test Item    Value        Reference Range Interpretation Comments

 

             Potassium Level (test code = 2823-3) 4.7          3.5-5.1          

          





[facility] CenterChloride Qwyyx2876-02-81 04:28:00* 



             Test Item    Value        Reference Range Interpretation Comments

 

             Chloride Level (test code = 2075-0) 106                      

         





[facility] CenterCarbon Dioxide Xhqsi7108-18-71 04:28:00* 



             Test Item    Value        Reference Range Interpretation Comments

 

             Carbon Dioxide Level (test code = 2028-9) 21           22-29       

 L             





[facility] CenterAnion Pzv4238-71-76 04:28:00* 



             Test Item    Value        Reference Range Interpretation Comments

 

             Anion Gap (test code = 33037-3) 14.7         8-16                  

     





[facility] CenterBlood Urea Lzrtnzxs8441-64-04 04:28:00* 



             Test Item    Value        Reference Range Interpretation Comments

 

             Blood Urea Nitrogen (test code = 3094-0) 10           7-26         

              





[facility] CenterCreatinine2018-05-28 04:28:00* 



             Test Item    Value        Reference Range Interpretation Comments

 

             Creatinine (test code = 2160-0) 0.69         0.57-1.11             

     





[facility] CenterBUN/Creatinine Fqabu9652-25-92 04:28:00* 



             Test Item    Value        Reference Range Interpretation Comments

 

             BUN/Creatinine Ratio (test code = 3097-3) 14           6-25        

               





[facility] CenterEstimat Glomerular Filtration Rate
2018 04:28:00* 



             Test Item    Value        Reference Range Interpretation Comments

 

             Estimat Glomerular Filtration Rate (test code = 155776468) > 60    

     >60                        





Ranges were taken from the National Kidney Disease Education Program and the [facility] Kid[facility] Foundation literature.Reference ranges:60 or greater: Whtftp63-61 (
for 3 consecutive months): Chronic kidney disease 15 or less: Kidney failureBaylor Scott & White McLane Children's Medical CenterGlucose Lmofa6274-64-32 04:28:00* 



             Test Item    Value        Reference Range Interpretation Comments

 

             Glucose Level (test code = ACT0512) 94                       

         





[facility] CenterCalcium Mylgi3381-57-71 04:28:00* 



             Test Item    Value        Reference Range Interpretation Comments

 

             Calcium Level (test code = 79415-5) 9.0          8.4-10.2          

         





[facility] CenterTotal Vzcmexzbm4207-75-04 04:28:00* 



             Test Item    Value        Reference Range Interpretation Comments

 

             Total Bilirubin (test code = 1975-2) 0.6          0.2-1.2          

          





[facility] CenterAspartate Amino Transf (AST/SGOT)
2018 04:28:00* 



             Test Item    Value        Reference Range Interpretation Comments

 

                                        Aspartate Amino Transf (AST/SGOT) (test 

code = Aspartate Amino Transf 

(AST/SGOT))     23              5-34                             





[facility] CenterAlanine Aminotransferase (ALT/SGPT)
2018 04:28:00* 



             Test Item    Value        Reference Range Interpretation Comments

 

             Alanine Aminotransferase (ALT/SGPT) (test code = 1742-6) 13        

   0-55                       





[facility] CenterTotal Iruriqf9378-68-37 04:28:00* 



             Test Item    Value        Reference Range Interpretation Comments

 

             Total Protein (test code = 2885-2) 7.5          6.5-8.1            

        





[facility] CenterAlbumin2018-05-28 04:28:00* 



             Test Item    Value        Reference Range Interpretation Comments

 

             Albumin (test code = 1751-7) 3.8          3.5-5.0                  

  





[facility] CenterGlobulin2018-05-28 04:28:00* 



             Test Item    Value        Reference Range Interpretation Comments

 

             Globulin (test code = 95239-2) 3.7          2.3-3.5      H         

    





[facility] CenterAlbumin/Globulin Xcflk3797-02-61 04:28:00
  * 



             Test Item    Value        Reference Range Interpretation Comments

 

             Albumin/Globulin Ratio (test code = 1759-0) 1.0          0.8-2.0   

                 





[facility] CenterAlkaline Epdezjiwuiy7064-10-52 04:28:00* 



             Test Item    Value        Reference Range Interpretation Comments

 

             Alkaline Phosphatase (test code = 6768-6) 90                 

               





[facility] CenterCreatine Mkumsj1502-03-83 04:28:00* 



             Test Item    Value        Reference Range Interpretation Comments

 

             Creatine Kinase (test code = 2157-6) 67                      

          





[facility] CenterCreatine Kinase [id number] 04:28:00* 



             Test Item    Value        Reference Range Interpretation Comments

 

             Creatine Kinase MB (test code = 71390-0) 0.70         0-5.0        

              





[facility] CenterAmylase Zkpba1441-57-02 04:28:00* 



             Test Item    Value        Reference Range Interpretation Comments

 

             Amylase Level (test code = 1798-8) 68                        

        





[facility] CenterLipase2018-05-28 04:28:00* 



             Test Item    Value        Reference Range Interpretation Comments

 

             Lipase (test code = 3040-3) 37           8-78                      

 





[facility] CenterWhite Blood Mltot3341-57-35 04:08:00* 



             Test Item    Value        Reference Range Interpretation Comments

 

             White Blood Count (test code = 6690-2) 4.07         4.8-10.8     L 

            





[facility] CenterRed Blood Ayzpm5071-06-75 04:08:00* 



             Test Item    Value        Reference Range Interpretation Comments

 

             Red Blood Count (test code = 789-8) 4.11         3.6-5.1           

         





[facility] CenterHemoglobin2018-05-28 04:08:00* 



             Test Item    Value        Reference Range Interpretation Comments

 

             Hemoglobin (test code = 89252-4) 10.9         12.0-16.0    L       

      





[facility] CenterHematocrit2018-05-28 04:08:00* 



             Test Item    Value        Reference Range Interpretation Comments

 

             Hematocrit (test code = 4544-3) 35.2         34.2-44.1             

     





[facility] CenterMean Corpuscular Eccuuf4705-59-30 
04:08:00* 



             Test Item    Value        Reference Range Interpretation Comments

 

             Mean Corpuscular Volume (test code = 787-2) 85.6         81-99     

                 





[facility] CenterMean Corpuscular Fnbtfdyvhe4641-46-96 
04:08:00* 



             Test Item    Value        Reference Range Interpretation Comments

 

             Mean Corpuscular Hemoglobin (test code = 785-6) 26.5         28-32 

       L             





[facility] CenterMean Corpuscular Hemoglobin Concent
2018 04:08:00* 



             Test Item    Value        Reference Range Interpretation Comments

 

             Mean Corpuscular Hemoglobin Concent (test code = 786-4) 31.0       

  31-35                      





[facility] CenterRed Cell Distribution Irgjb4170-94-76 
04:08:00* 



             Test Item    Value        Reference Range Interpretation Comments

 

             Red Cell Distribution Width (test code = 96588-1) 13.4         11.7

-14.4                  





[facility] CenterPlatelet Rdhqa4130-99-38 04:08:00* 



             Test Item    Value        Reference Range Interpretation Comments

 

             Platelet Count (test code = 777-3) 187          140-360            

        





[facility] CenterNeutrophils (%) (Auto)2018 04:08:00
  * 



             Test Item    Value        Reference Range Interpretation Comments

 

             Neutrophils (%) (Auto) (test code = 53678-5) 52.1         38.7-80.0

                  





[facility] CenterLymphocytes (%) (Auto)2018 04:08:00
  * 



             Test Item    Value        Reference Range Interpretation Comments

 

             Lymphocytes (%) (Auto) (test code = 736-9) 33.9         18.0-39.1  

                





[facility] CenterMonocytes (%) (Auto)2018 04:08:00* 



             Test Item    Value        Reference Range Interpretation Comments

 

             Monocytes (%) (Auto) (test code = 5905-5) 11.3         4.4-11.3    

               





[facility] CenterEosinophils (%) (Auto)2018 04:08:00
  * 



             Test Item    Value        Reference Range Interpretation Comments

 

             Eosinophils (%) (Auto) (test code = 713-8) 2.0          0.0-6.0    

                





[facility] CenterBasophils (%) (Auto)2018 04:08:00* 



             Test Item    Value        Reference Range Interpretation Comments

 

             Basophils (%) (Auto) (test code = 706-2) 0.5          0.0-1.0      

              





[facility] CenterIM GRANULOCYTES %2018 04:08:00* 



             Test Item    Value        Reference Range Interpretation Comments

 

             IM GRANULOCYTES % (test code = IM GRANULOCYTES %) 0.2          0.0-

1.0                    





[facility] CenterNeutrophils # (Auto)2018 04:08:00* 



             Test Item    Value        Reference Range Interpretation Comments

 

             Neutrophils # (Auto) (test code = 751-8) 2.1          2.1-6.9      

              





[facility] CenterLymphocytes # (Auto)2018 04:08:00* 



             Test Item    Value        Reference Range Interpretation Comments

 

             Lymphocytes # (Auto) (test code = 00218-9) 1.4          1.0-3.2    

                





[facility] CenterMonocytes # (Auto)2018 04:08:00* 



             Test Item    Value        Reference Range Interpretation Comments

 

             Monocytes # (Auto) (test code = 742-7) 0.5          0.2-0.8        

            





[facility] CenterEosinophils # (Auto)2018 04:08:00* 



             Test Item    Value        Reference Range Interpretation Comments

 

             Eosinophils # (Auto) (test code = 711-2) 0.1          0.0-0.4      

              





[facility] CenterBasophils # (Auto)2018 04:08:00* 



             Test Item    Value        Reference Range Interpretation Comments

 

             Basophils # (Auto) (test code = 704-7) 0.0          0.0-0.1        

            





[facility] CenterAbsolute Immature Granulocyte (auto
2018 04:08:00* 



             Test Item    Value        Reference Range Interpretation Comments

 

                                        Absolute Immature Granulocyte (auto (andrade

t code = Absolute Immature Granulocyte 

(auto)          0.01            0-0.1                            





[facility] CenterWhite Blood Utzax3590-99-40 04:08:00* 



             Test Item    Value        Reference Range Interpretation Comments

 

             White Blood Count (test code = 6690-2) 4.07         4.8-10.8     L 

            





[facility] CenterRed Blood Iwjbc8496-24-83 04:08:00* 



             Test Item    Value        Reference Range Interpretation Comments

 

             Red Blood Count (test code = 789-8) 4.11         3.6-5.1           

         





[facility] CenterHemoglobin2018-05-28 04:08:00* 



             Test Item    Value        Reference Range Interpretation Comments

 

             Hemoglobin (test code = 39340-3) 10.9         12.0-16.0    L       

      





[facility] CenterHematocrit2018-05-28 04:08:00* 



             Test Item    Value        Reference Range Interpretation Comments

 

             Hematocrit (test code = 4544-3) 35.2         34.2-44.1             

     





[facility] CenterMean Corpuscular Wdrxxs5432-80-36 
04:08:00* 



             Test Item    Value        Reference Range Interpretation Comments

 

             Mean Corpuscular Volume (test code = 787-2) 85.6         81-99     

                 





[facility] CenterMean Corpuscular Oareglrhtm6130-44-31 
04:08:00* 



             Test Item    Value        Reference Range Interpretation Comments

 

             Mean Corpuscular Hemoglobin (test code = 785-6) 26.5         28-32 

       L             





[facility] CenterMean Corpuscular Hemoglobin Concent
2018 04:08:00* 



             Test Item    Value        Reference Range Interpretation Comments

 

             Mean Corpuscular Hemoglobin Concent (test code = 786-4) 31.0       

  31-35                      





[facility] CenterRed Cell Distribution Pxecr1528-18-80 
04:08:00* 



             Test Item    Value        Reference Range Interpretation Comments

 

             Red Cell Distribution Width (test code = 11444-1) 13.4         11.7

-14.4                  





[facility] CenterPlatelet Budtu4028-34-90 04:08:00* 



             Test Item    Value        Reference Range Interpretation Comments

 

             Platelet Count (test code = 777-3) 187          140-360            

        





[facility] CenterNeutrophils (%) (Auto)2018 04:08:00
  * 



             Test Item    Value        Reference Range Interpretation Comments

 

             Neutrophils (%) (Auto) (test code = 41704-1) 52.1         38.7-80.0

                  





[facility] CenterLymphocytes (%) (Auto)2018 04:08:00
  * 



             Test Item    Value        Reference Range Interpretation Comments

 

             Lymphocytes (%) (Auto) (test code = 736-9) 33.9         18.0-39.1  

                





[facility] CenterMonocytes (%) (Auto)2018 04:08:00* 



             Test Item    Value        Reference Range Interpretation Comments

 

             Monocytes (%) (Auto) (test code = 5905-5) 11.3         4.4-11.3    

               





[facility] CenterEosinophils (%) (Auto)2018 04:08:00
  * 



             Test Item    Value        Reference Range Interpretation Comments

 

             Eosinophils (%) (Auto) (test code = 713-8) 2.0          0.0-6.0    

                





[facility] CenterBasophils (%) (Auto)2018 04:08:00* 



             Test Item    Value        Reference Range Interpretation Comments

 

             Basophils (%) (Auto) (test code = 706-2) 0.5          0.0-1.0      

              





[facility] CenterIM GRANULOCYTES %2018 04:08:00* 



             Test Item    Value        Reference Range Interpretation Comments

 

             IM GRANULOCYTES % (test code = IM GRANULOCYTES %) 0.2          0.0-

1.0                    





[facility] CenterNeutrophils # (Auto)2018 04:08:00* 



             Test Item    Value        Reference Range Interpretation Comments

 

             Neutrophils # (Auto) (test code = 751-8) 2.1          2.1-6.9      

              





[facility] CenterLymphocytes # (Auto)2018 04:08:00* 



             Test Item    Value        Reference Range Interpretation Comments

 

             Lymphocytes # (Auto) (test code = 70689-3) 1.4          1.0-3.2    

                





[facility] CenterMonocytes # (Auto)2018 04:08:00* 



             Test Item    Value        Reference Range Interpretation Comments

 

             Monocytes # (Auto) (test code = 742-7) 0.5          0.2-0.8        

            





[facility] CenterEosinophils # (Auto)2018 04:08:00* 



             Test Item    Value        Reference Range Interpretation Comments

 

             Eosinophils # (Auto) (test code = 711-2) 0.1          0.0-0.4      

              





[facility] CenterBasophils # (Auto)2018 04:08:00* 



             Test Item    Value        Reference Range Interpretation Comments

 

             Basophils # (Auto) (test code = 704-7) 0.0          0.0-0.1        

            





[facility] CenterAbsolute Immature Granulocyte (auto
2018 04:08:00* 



             Test Item    Value        Reference Range Interpretation Comments

 

                                        Absolute Immature Granulocyte (auto (andrade

t code = Absolute Immature Granulocyte 

(auto)          0.01            0-0.1                            





[facility] CenterWhite Blood Qkdjo6388-06-04 04:08:00* 



             Test Item    Value        Reference Range Interpretation Comments

 

             White Blood Count (test code = 6690-2) 4.07         4.8-10.8     L 

            





[facility] CenterRed Blood Dpkmf8738-61-03 04:08:00* 



             Test Item    Value        Reference Range Interpretation Comments

 

             Red Blood Count (test code = 789-8) 4.11         3.6-5.1           

         





[facility] CenterHemoglobin2018-05-28 04:08:00* 



             Test Item    Value        Reference Range Interpretation Comments

 

             Hemoglobin (test code = 29042-9) 10.9         12.0-16.0    L       

      





[facility] CenterHematocrit2018-05-28 04:08:00* 



             Test Item    Value        Reference Range Interpretation Comments

 

             Hematocrit (test code = 4544-3) 35.2         34.2-44.1             

     





[facility] CenterMean Corpuscular Tjksrf8911-85-23 
04:08:00* 



             Test Item    Value        Reference Range Interpretation Comments

 

             Mean Corpuscular Volume (test code = 787-2) 85.6         81-99     

                 





[facility] CenterMean Corpuscular Rhtdrcacpr2592-42-54 
04:08:00* 



             Test Item    Value        Reference Range Interpretation Comments

 

             Mean Corpuscular Hemoglobin (test code = 785-6) 26.5         28-32 

       L             





[facility] CenterMean Corpuscular Hemoglobin Concent
2018 04:08:00* 



             Test Item    Value        Reference Range Interpretation Comments

 

             Mean Corpuscular Hemoglobin Concent (test code = 786-4) 31.0       

  31-35                      





[facility] CenterRed Cell Distribution Llgkn5401-57-40 
04:08:00* 



             Test Item    Value        Reference Range Interpretation Comments

 

             Red Cell Distribution Width (test code = 45018-8) 13.4         11.7

-14.4                  





[facility] CenterPlatelet Ysdsk6669-83-94 04:08:00* 



             Test Item    Value        Reference Range Interpretation Comments

 

             Platelet Count (test code = 777-3) 187          140-360            

        





[facility] CenterNeutrophils (%) (Auto)2018 04:08:00
  * 



             Test Item    Value        Reference Range Interpretation Comments

 

             Neutrophils (%) (Auto) (test code = 84301-3) 52.1         38.7-80.0

                  





[facility] CenterLymphocytes (%) (Auto)2018 04:08:00
  * 



             Test Item    Value        Reference Range Interpretation Comments

 

             Lymphocytes (%) (Auto) (test code = 736-9) 33.9         18.0-39.1  

                





[facility] CenterMonocytes (%) (Auto)2018 04:08:00* 



             Test Item    Value        Reference Range Interpretation Comments

 

             Monocytes (%) (Auto) (test code = 5905-5) 11.3         4.4-11.3    

               





[facility] CenterEosinophils (%) (Auto)2018 04:08:00
  * 



             Test Item    Value        Reference Range Interpretation Comments

 

             Eosinophils (%) (Auto) (test code = 713-8) 2.0          0.0-6.0    

                





[facility] CenterBasophils (%) (Auto)2018 04:08:00* 



             Test Item    Value        Reference Range Interpretation Comments

 

             Basophils (%) (Auto) (test code = 706-2) 0.5          0.0-1.0      

              





[facility] CenterIM GRANULOCYTES %2018 04:08:00* 



             Test Item    Value        Reference Range Interpretation Comments

 

             IM GRANULOCYTES % (test code = IM GRANULOCYTES %) 0.2          0.0-

1.0                    





[facility] CenterNeutrophils # (Auto)2018 04:08:00* 



             Test Item    Value        Reference Range Interpretation Comments

 

             Neutrophils # (Auto) (test code = 751-8) 2.1          2.1-6.9      

              





[facility] CenterLymphocytes # (Auto)2018 04:08:00* 



             Test Item    Value        Reference Range Interpretation Comments

 

             Lymphocytes # (Auto) (test code = 86315-5) 1.4          1.0-3.2    

                





[facility] CenterMonocytes # (Auto)2018 04:08:00* 



             Test Item    Value        Reference Range Interpretation Comments

 

             Monocytes # (Auto) (test code = 742-7) 0.5          0.2-0.8        

            





[facility] CenterEosinophils # (Auto)2018 04:08:00* 



             Test Item    Value        Reference Range Interpretation Comments

 

             Eosinophils # (Auto) (test code = 711-2) 0.1          0.0-0.4      

              





[facility] CenterBasophils # (Auto)2018 04:08:00* 



             Test Item    Value        Reference Range Interpretation Comments

 

             Basophils # (Auto) (test code = 704-7) 0.0          0.0-0.1        

            





[facility] CenterAbsolute Immature Granulocyte (auto
2018 04:08:00* 



             Test Item    Value        Reference Range Interpretation Comments

 

                                        Absolute Immature Granulocyte (auto (andrade

t code = Absolute Immature Granulocyte 

(auto)          0.01            0-0.1                            





[facility] CenterUrine [id number] 04:07:00* 



             Test Item    Value        Reference Range Interpretation Comments

 

             Urine WBC (test code = 5821-4) 11-20        0-5          H         

    





[facility] CenterUrine [id number] 04:07:00* 



             Test Item    Value        Reference Range Interpretation Comments

 

             Urine RBC (test code = 63515-8) 6-10         0-5          H        

     





[facility] CenterUrine Eeloatdg9244-13-36 04:07:00* 



             Test Item    Value        Reference Range Interpretation Comments

 

             Urine Bacteria (test code = 80456-3) RARE         NONE             

          





[facility] CenterUrine Epithelial Xkpqo4533-84-01 04:07:00
  * 



             Test Item    Value        Reference Range Interpretation Comments

 

             Urine Epithelial Cells (test code = 27783-0) FEW          NONE     

                  





[facility] CenterUrine Transitional Epithelial Cells
2018 04:07:00* 



             Test Item    Value        Reference Range Interpretation Comments

 

             Urine Transitional Epithelial Cells (test code = 8249-5) FEW       

   NONE         H             





[facility] CenterUrine [id number] 04:07:00* 



             Test Item    Value        Reference Range Interpretation Comments

 

             Urine WBC (test code = 5821-4) 11-20        0-5          H         

    





[facility] CenterUrine [id number] 04:07:00* 



             Test Item    Value        Reference Range Interpretation Comments

 

             Urine RBC (test code = 60417-1) 6-10         0-5          H        

     





[facility] Jdmcaeif1864-02-56 04:07:00* 



             Test Item    Value        Reference Range Interpretation Comments

 

             Urine Bacteria (test code = 96164-7) RARE         NONE             

          





[facility] CenterUrine Epithelial Pvbgl6951-81-02 04:07:00
  * 



             Test Item    Value        Reference Range Interpretation Comments

 

             Urine Epithelial Cells (test code = 36037-7) FEW          NONE     

                  





[facility] CenterUrine Transitional Epithelial Cells
2018 04:07:00* 



             Test Item    Value        Reference Range Interpretation Comments

 

             Urine Transitional Epithelial Cells (test code = 8249-5) FEW       

   NONE         H             





[facility] CenterUrine [id number] 04:07:00* 



             Test Item    Value        Reference Range Interpretation Comments

 

             Urine WBC (test code = 5821-4) 11-20        0-5          H         

    





[facility] CenterUrine [id number] 04:07:00* 



             Test Item    Value        Reference Range Interpretation Comments

 

             Urine RBC (test code = 68111-3) 6-10         0-5          H        

     





[facility] CenterUrine Rwuqtpac9398-34-81 04:07:00* 



             Test Item    Value        Reference Range Interpretation Comments

 

             Urine Bacteria (test code = 54828-7) RARE         NONE             

          





[facility] CenterUrine Epithelial Zltfm9942-12-31 04:07:00
  * 



             Test Item    Value        Reference Range Interpretation Comments

 

             Urine Epithelial Cells (test code = 94996-7) FEW          NONE     

                  





[facility] CenterUrine Transitional Epithelial Cells
2018 04:07:00* 



             Test Item    Value        Reference Range Interpretation Comments

 

             Urine Transitional Epithelial Cells (test code = 8249-5) FEW       

   NONE         H             





[facility] CenterUrine Bjqwq0344-23-31 04:00:00* 



             Test Item    Value        Reference Range Interpretation Comments

 

             Urine Color (test code = 5778-6) YELLOW       YELLOW               

      





[facility] CenterUrine Ywbhvgu3196-70-31 04:00:00* 



             Test Item    Value        Reference Range Interpretation Comments

 

             Urine Clarity (test code = 05546-7) CLEAR        CLEAR             

         





[facility] CenterUrine Specific Cohbvmi3288-36-41 04:00:00
  * 



             Test Item    Value        Reference Range Interpretation Comments

 

             Urine Specific Gravity (test code = 5811-5) 1.015        1.010-1.02

5                





[facility] CenterUrine [id number] 04:00:00* 



             Test Item    Value        Reference Range Interpretation Comments

 

             Urine pH (test code = 41658-3) 8            5-7          H         

    





[facility] CenterUrine Leukocyte Ixfmpbcq0242-56-56 
04:00:00* 



             Test Item    Value        Reference Range Interpretation Comments

 

             Urine Leukocyte Esterase (test code = 5799-2) TRACE        NEGATIVE

     H             





[facility] CenterUrine Bmkkbbv9222-92-10 04:00:00* 



             Test Item    Value        Reference Range Interpretation Comments

 

             Urine Nitrite (test code = 07924-2) NEGATIVE     NEGATIVE          

         





[facility] CenterUrine Nmaslge1596-73-84 04:00:00* 



             Test Item    Value        Reference Range Interpretation Comments

 

             Urine Protein (test code = 5804-0) NEGATIVE     NEGATIVE           

        





[facility] CenterUrine Glucose (UA)2018 04:00:00* 



             Test Item    Value        Reference Range Interpretation Comments

 

             Urine Glucose (UA) (test code = 2349-9) NEGATIVE     NEGATIVE      

             





[facility] CenterUrine Almarrm9345-83-00 04:00:00* 



             Test Item    Value        Reference Range Interpretation Comments

 

             Urine Ketones (test code = 26202-6) NEGATIVE     NEGATIVE          

         





[facility] CenterUrine Fjumdcxrfmaq4222-48-99 04:00:00* 



             Test Item    Value        Reference Range Interpretation Comments

 

             Urine Urobilinogen (test code = 22380-6) 1            0.2-1        

              





[facility] CenterUrine Gbkuatxpx7266-91-01 04:00:00* 



             Test Item    Value        Reference Range Interpretation Comments

 

             Urine Bilirubin (test code = 1978-6) NEGATIVE     NEGATIVE         

          





[facility] CenterUrine Cgcys1222-52-63 04:00:00* 



             Test Item    Value        Reference Range Interpretation Comments

 

             Urine Blood (test code = 36243-9) TRACE        NEGATIVE     H      

       





[facility] CenterUrine Fybut7473-10-08 04:00:00* 



             Test Item    Value        Reference Range Interpretation Comments

 

             Urine Color (test code = 5778-6) YELLOW       YELLOW               

      





[facility] CenterUrine Vietskv0345-10-29 04:00:00* 



             Test Item    Value        Reference Range Interpretation Comments

 

             Urine Clarity (test code = 87880-6) CLEAR        CLEAR             

         





[facility] CenterUrine Specific Uqfodtl6869-32-74 04:00:00
  * 



             Test Item    Value        Reference Range Interpretation Comments

 

             Urine Specific Gravity (test code = 5811-5) 1.015        1.010-1.02

5                





[facility] CenterUrine [id number] 04:00:00* 



             Test Item    Value        Reference Range Interpretation Comments

 

             Urine pH (test code = 16978-1) 8            5-7          H         

    





[facility] CenterUrine Leukocyte Mpqesbuj5610-24-31 
04:00:00* 



             Test Item    Value        Reference Range Interpretation Comments

 

             Urine Leukocyte Esterase (test code = 5799-2) TRACE        NEGATIVE

     H             





[facility] CenterUrine Xsgawni6257-46-05 04:00:00* 



             Test Item    Value        Reference Range Interpretation Comments

 

             Urine Nitrite (test code = 48430-9) NEGATIVE     NEGATIVE          

         





[facility] CenterUrine Bmbxepn3576-90-33 04:00:00* 



             Test Item    Value        Reference Range Interpretation Comments

 

             Urine Protein (test code = 5804-0) NEGATIVE     NEGATIVE           

        





[facility] Glucose (UA)2018 04:00:00* 



             Test Item    Value        Reference Range Interpretation Comments

 

             Urine Glucose (UA) (test code = 2349-9) NEGATIVE     NEGATIVE      

             





[facility] CenterUrine Pfuyhtv2335-97-77 04:00:00* 



             Test Item    Value        Reference Range Interpretation Comments

 

             Urine Ketones (test code = 21822-5) NEGATIVE     NEGATIVE          

         





[facility] Ddxsbbsrmncc5084-37-97 04:00:00* 



             Test Item    Value        Reference Range Interpretation Comments

 

             Urine Urobilinogen (test code = 40835-2) 1            0.2-1        

              





[facility] CenterUrine Essaznckb6485-81-76 04:00:00* 



             Test Item    Value        Reference Range Interpretation Comments

 

             Urine Bilirubin (test code = 1978-6) NEGATIVE     NEGATIVE         

          





[facility] CenterUrine Tktnq0961-23-83 04:00:00* 



             Test Item    Value        Reference Range Interpretation Comments

 

             Urine Blood (test code = 69284-3) TRACE        NEGATIVE     H      

       





[facility] CenterUrine Nkeln9563-67-66 04:00:00* 



             Test Item    Value        Reference Range Interpretation Comments

 

             Urine Color (test code = 5778-6) YELLOW       YELLOW               

      





[facility] CenterUrine Pgjlgif9433-53-84 04:00:00* 



             Test Item    Value        Reference Range Interpretation Comments

 

             Urine Clarity (test code = 17994-7) CLEAR        CLEAR             

         





[facility] CenterUrine Specific Gadpldx0829-92-44 04:00:00
  * 



             Test Item    Value        Reference Range Interpretation Comments

 

             Urine Specific Gravity (test code = 5811-5) 1.015        1.010-1.02

5                





[facility] CenterUrine [id number] 04:00:00* 



             Test Item    Value        Reference Range Interpretation Comments

 

             Urine pH (test code = 47567-4) 8            5-7          H         

    





[facility] CenterUrine Leukocyte Nvqqpgen6550-11-87 
04:00:00* 



             Test Item    Value        Reference Range Interpretation Comments

 

             Urine Leukocyte Esterase (test code = 5799-2) TRACE        NEGATIVE

     H             





[facility] Dnterav8493-48-73 04:00:00* 



             Test Item    Value        Reference Range Interpretation Comments

 

             Urine Nitrite (test code = 88475-4) NEGATIVE     NEGATIVE          

         





[facility] CenterUrine Tdulyne0021-28-00 04:00:00* 



             Test Item    Value        Reference Range Interpretation Comments

 

             Urine Protein (test code = 5804-0) NEGATIVE     NEGATIVE           

        





[facility] CenterUrine Glucose (UA)2018 04:00:00* 



             Test Item    Value        Reference Range Interpretation Comments

 

             Urine Glucose (UA) (test code = 2349-9) NEGATIVE     NEGATIVE      

             





[facility] CenterUrine Qhnlqco0420-62-95 04:00:00* 



             Test Item    Value        Reference Range Interpretation Comments

 

             Urine Ketones (test code = 95992-5) NEGATIVE     NEGATIVE          

         





[facility] Bvgcyrclxzsy6219-57-51 04:00:00* 



             Test Item    Value        Reference Range Interpretation Comments

 

             Urine Urobilinogen (test code = 08800-1) 1            0.2-1        

              





[facility] CenterUrine Whyailppe8137-96-59 04:00:00* 



             Test Item    Value        Reference Range Interpretation Comments

 

             Urine Bilirubin (test code = 1978-6) NEGATIVE     NEGATIVE         

          





[facility] CenterUrine Hdgct1510-48-98 04:00:00* 



             Test Item    Value        Reference Range Interpretation Comments

 

             Urine Blood (test code = 09558-3) TRACE        NEGATIVE     H      

       





[facility] CenterCreatine Kinase [id number] 16:21:00* 



             Test Item    Value        Reference Range Interpretation Comments

 

             Creatine Kinase MB (test code = 40672-9) 0.60         0-5.0        

              





[facility] CenterTroponin -18-94 16:21:00* 



             Test Item    Value        Reference Range Interpretation Comments

 

             Troponin I (test code = OKE9784) -0.001       0-0.300              

      





[facility] CenterCreatine Ijmnro4686-53-14 16:15:00* 



             Test Item    Value        Reference Range Interpretation Comments

 

             Creatine Kinase (test code = 2157-6) 52                      

          





[facility] CenterProthrombin Uzss8773-10-30 16:13:00* 



             Test Item    Value        Reference Range Interpretation Comments

 

             Prothrombin Time (test code = 5902-2) 12.5         11.9-14.5       

           





[facility] CenterProthromb Time International Ratio
2018 16:13:00* 



             Test Item    Value        Reference Range Interpretation Comments

 

             Prothromb Time International Ratio (test code = 6301-6) 1.01       

                             





Oral Anticoagulant Therapy INR Values:1. Low Intensity Therapy        1.5 - 2.02
. Moderate Intensity Therapy   2.0 - 3.03. High Intensity Therapy(1)    2.5 - 3.
54. High Intensity Therapy(2)    3.0 - 4.05. Panic Value INR              > 5.0
[facility] CenterActivated Partial Thromboplast Time
2018 16:13:00* 



             Test Item    Value        Reference Range Interpretation Comments

 

             Activated Partial Thromboplast Time (test code = 46343-0) 28.3     

    23.8-35.5                  





[facility] CenterProthrombin [id number] 16:13:00* 



             Test Item    Value        Reference Range Interpretation Comments

 

             Prothrombin Time (test code = 5902-2) 12.5         11.9-14.5       

           





[facility] CenterProthromb Time International Ratio
2018 16:13:00* 



             Test Item    Value        Reference Range Interpretation Comments

 

             Prothromb Time International Ratio (test code = 6301-6) 1.01       

                             





Oral Anticoagulant Therapy INR Values:1. Low Intensity Therapy        1.5 - 2.02
. Moderate Intensity Therapy   2.0 - 3.03. High Intensity Therapy(1)    2.5 - 3.
54. High Intensity Therapy(2)    3.0 - 4.05. Panic Value INR              > 5.0
[facility] CenterActivated Partial Thromboplast Time
2018 16:13:00* 



             Test Item    Value        Reference Range Interpretation Comments

 

             Activated Partial Thromboplast Time (test code = 05339-8) 28.3     

    23.8-35.5                  





[facility] CenterUrine [id number] 14:19:00* 



             Test Item    Value        Reference Range Interpretation Comments

 

             Urine WBC (test code = 5821-4) 6-10         0-5          H         

    





[facility] CenterUrine [id number] 14:19:00* 



             Test Item    Value        Reference Range Interpretation Comments

 

             Urine RBC (test code = 96676-8) 0-5          0-5                   

     





[facility] CenterUrine Zwzmfddg5468-55-48 14:19:00* 



             Test Item    Value        Reference Range Interpretation Comments

 

             Urine Bacteria (test code = 41003-6) FEW          NONE             

          





[facility] CenterUrine Epithelial Jvvrl3474-86-95 14:19:00
  * 



             Test Item    Value        Reference Range Interpretation Comments

 

             Urine Epithelial Cells (test code = 34879-2) FEW          NONE     

                  





[facility] CenterUrine Vurxr3722-25-07 14:01:00* 



             Test Item    Value        Reference Range Interpretation Comments

 

             Urine Color (test code = 5778-6) YELLOW       YELLOW               

      





[facility] CenterUrine Xifubwp0214-89-64 14:01:00* 



             Test Item    Value        Reference Range Interpretation Comments

 

             Urine Clarity (test code = 65633-0) CLEAR        CLEAR             

         





[facility] CenterUrine Specific Hbyzcdd2756-03-84 14:01:00
  * 



             Test Item    Value        Reference Range Interpretation Comments

 

             Urine Specific Gravity (test code = 5811-5) 1.010        1.010-1.02

5                





[facility] CenterUrine lZ8703-08-26 14:01:00* 



             Test Item    Value        Reference Range Interpretation Comments

 

             Urine pH (test code = 83983-6) 7            5-7                    

    





[facility] CenterUrine Leukocyte Narbleau0261-05-65 
14:01:00* 



             Test Item    Value        Reference Range Interpretation Comments

 

             Urine Leukocyte Esterase (test code = 5799-2) 1+           NEGATIVE

     H             





[facility] CenterUrine Jcltoto2422-55-52 14:01:00* 



             Test Item    Value        Reference Range Interpretation Comments

 

             Urine Nitrite (test code = 56222-1) NEGATIVE     NEGATIVE          

         





[facility] CenterUrine Fgjsioi8396-79-76 14:01:00* 



             Test Item    Value        Reference Range Interpretation Comments

 

             Urine Protein (test code = 5804-0) NEGATIVE     NEGATIVE           

        





[facility] CenterUrine Glucose (UA)2018 14:01:00* 



             Test Item    Value        Reference Range Interpretation Comments

 

             Urine Glucose (UA) (test code = 2349-9) NEGATIVE     NEGATIVE      

             





[facility] Cwwogsl3203-69-07 14:01:00* 



             Test Item    Value        Reference Range Interpretation Comments

 

             Urine Ketones (test code = 45609-1) NEGATIVE     NEGATIVE          

         





[facility] Aaegyzloooaf5825-38-14 14:01:00* 



             Test Item    Value        Reference Range Interpretation Comments

 

             Urine Urobilinogen (test code = 60908-7) 0.2          0.2-1        

              





[facility] Kbixgxhlh7358-77-97 14:01:00* 



             Test Item    Value        Reference Range Interpretation Comments

 

             Urine Bilirubin (test code = 1978-6) NEGATIVE     NEGATIVE         

          





[facility] Ycgvo8940-84-26 14:01:00* 



             Test Item    Value        Reference Range Interpretation Comments

 

             Urine Blood (test code = 40883-8) 1+           NEGATIVE     H      

       





[facility] CenterMagnesium Hleym7000-58-02 14:01:00* 



             Test Item    Value        Reference Range Interpretation Comments

 

             Magnesium Level (test code = 13161-1) 1.8          1.3-2.1         

           





[facility] CenterAmylase Cxedk4212-89-20 14:01:00* 



             Test Item    Value        Reference Range Interpretation Comments

 

             Amylase Level (test code = 1798-8) 62                        

        





[facility] CenterLipase2018-03-29 14:01:00* 



             Test Item    Value        Reference Range Interpretation Comments

 

             Lipase (test code = 3040-3) 37           8-78                      

 





[facility] CenterMagnesium Gagze7621-85-27 14:01:00* 



             Test Item    Value        Reference Range Interpretation Comments

 

             Magnesium Level (test code = 46469-5) 1.8          1.3-2.1         

           





[facility] Dssnp1875-85-36 13:55:00* 



             Test Item    Value        Reference Range Interpretation Comments

 

             Sodium Level (test code = 2951-2) 138          136-145             

       





[facility] CenterPotassium Vdbyc3662-26-27 13:55:00* 



             Test Item    Value        Reference Range Interpretation Comments

 

             Potassium Level (test code = 2823-3) 4.2          3.5-5.1          

          





[facility] CenterChloride Pdvdx1851-98-26 13:55:00* 



             Test Item    Value        Reference Range Interpretation Comments

 

             Chloride Level (test code = 2075-0) 105                      

         





[facility] CenterCarbon Dioxide Onomr6918-64-52 13:55:00* 



             Test Item    Value        Reference Range Interpretation Comments

 

             Carbon Dioxide Level (test code = 2028-9) 25           -       

               





[facility] CenterAnion Pbf3119-18-71 13:55:00* 



             Test Item    Value        Reference Range Interpretation Comments

 

             Anion Gap (test code = 33037-3) 12.2         8-16                  

     





[facility] CenterBlood Urea Toznahga3975-60-16 13:55:00* 



             Test Item    Value        Reference Range Interpretation Comments

 

             Blood Urea Nitrogen (test code = 3094-0) 9            7-26         

              





[facility] CenterCreatinine2018-03-29 13:55:00* 



             Test Item    Value        Reference Range Interpretation Comments

 

             Creatinine (test code = 2160-0) 0.68         0.57-1.11             

     





[facility] CenterBUN/Creatinine Icolv8538-88-07 13:55:00* 



             Test Item    Value        Reference Range Interpretation Comments

 

             BUN/Creatinine Ratio (test code = 3097-3) 13           6-25        

               





[facility] CenterEstimat Glomerular Filtration Rate
2018 13:55:00* 



             Test Item    Value        Reference Range Interpretation Comments

 

             Estimat Glomerular Filtration Rate (test code = 62938-2) 60-       

   >60                        





Ranges were taken from the National Kidney Disease Education Program and the [facility] [facility] literature.Reference ranges:60 or greater: Putqpd06-93 (
for 3 consecutive months): Chronic kidney disease 15 or less: Kidney failureCHI 
[facility]villeGlucose Jtqxp4810-88-41 13:55:00* 



             Test Item    Value        Reference Range Interpretation Comments

 

             Glucose Level (test code = HDC7386) 112                      

         





[facility] CenterCalcium Xyxnx5361-70-62 13:55:00* 



             Test Item    Value        Reference Range Interpretation Comments

 

             Calcium Level (test code = 20881-3) 9.1          8.4-10.2          

         





[facility] CenterToRe[facility] Czypwroem6942-09-29 13:55:00* 



             Test Item    Value        Reference Range Interpretation Comments

 

             Total Bilirubin (test code = 1975-2) 0.5          0.2-1.2          

          





[facility] Medical CenterAspartate Amino Transf (AST/SGOT)
2018 13:55:00* 



             Test Item    Value        Reference Range Interpretation Comments

 

                                        Aspartate Amino Transf (AST/SGOT) (test 

code = Aspartate Amino Transf 

(AST/SGOT))     15              5-34                             





[facility] CenterAlanine Aminotransferase (ALT/SGPT)
2018 13:55:00* 



             Test Item    Value        Reference Range Interpretation Comments

 

             Alanine Aminotransferase (ALT/SGPT) (test code = 1742-6) 6         

   0-55                       





[facility] CenterToRe[facility] Utyclld3353-83-79 13:55:00* 



             Test Item    Value        Reference Range Interpretation Comments

 

             Total Protein (test code = 2885-2) 6.9          6.5-8.1            

        





[facility] CenterAlbumin2018-03-29 13:55:00* 



             Test Item    Value        Reference Range Interpretation Comments

 

             Albumin (test code = 1751-7) 3.4          3.5-5.0      L           

  





[facility] CenterGlobulin2018-03-29 13:55:00* 



             Test Item    Value        Reference Range Interpretation Comments

 

             Globulin (test code = 52767-5) 3.5          2.3-3.5                

    





[facility] CenterAlbumin/Globulin Gxveb5574-18-16 13:55:00
  * 



             Test Item    Value        Reference Range Interpretation Comments

 

             Albumin/Globulin Ratio (test code = 1759-0) 1.0          0.8-2.0   

                 





[facility] CenterAlkaline Agdcexgmchq2796-09-50 13:55:00* 



             Test Item    Value        Reference Range Interpretation Comments

 

             Alkaline Phosphatase (test code = 6768-6) 78                 

               





[facility] CenterWhite Blood Mwxso2136-73-84 13:32:00* 



             Test Item    Value        Reference Range Interpretation Comments

 

             White Blood Count (test code = 6690-2) 6.10         4.8-10.8       

            





[facility] CenterRed Blood Fycuj8252-91-55 13:32:00* 



             Test Item    Value        Reference Range Interpretation Comments

 

             Red Blood Count (test code = 789-8) 3.87         3.6-5.1           

         





[facility] CenterHemoglobin2018-03-29 13:32:00* 



             Test Item    Value        Reference Range Interpretation Comments

 

             Hemoglobin (test code = 75283-7) 10.6         12.0-16.0    L       

      





[facility] CenterHematocrit2018-03-29 13:32:00* 



             Test Item    Value        Reference Range Interpretation Comments

 

             Hematocrit (test code = 4544-3) 32.4         34.2-44.1    L        

     





[facility] Medical CenterMean Corpuscular Vxhhqj2647-49-08 
13:32:00* 



             Test Item    Value        Reference Range Interpretation Comments

 

             Mean Corpuscular Volume (test code = 787-2) 83.7         81-99     

                 





[facility] CenterMean Corpuscular Cgdxridbut3272-90-50 
13:32:00* 



             Test Item    Value        Reference Range Interpretation Comments

 

             Mean Corpuscular Hemoglobin (test code = 785-6) 27.4         28-32 

       L             





[facility] CenterMean Corpuscular Hemoglobin Concent
2018 13:32:00* 



             Test Item    Value        Reference Range Interpretation Comments

 

             Mean Corpuscular Hemoglobin Concent (test code = 786-4) 32.7       

  31-35                      





[facility] CenterRed Cell Distribution Vqmks6817-17-70 
13:32:00* 



             Test Item    Value        Reference Range Interpretation Comments

 

             Red Cell Distribution Width (test code = 24165-9) 14.4         11.7

-14.4                  





[facility] CenterPlatelet Dlrug3073-78-80 13:32:00* 



             Test Item    Value        Reference Range Interpretation Comments

 

             Platelet Count (test code = 777-3) 202          140-360            

        





[facility] CenterNeutrophils (%) (Auto)2018 13:32:00
  * 



             Test Item    Value        Reference Range Interpretation Comments

 

             Neutrophils (%) (Auto) (test code = 23453-9) 73.9         38.7-80.0

                  





[facility] Medical CenterLymphocytes (%) (Auto)2018 13:32:00
  * 



             Test Item    Value        Reference Range Interpretation Comments

 

             Lymphocytes (%) (Auto) (test code = 736-9) 16.6         18.0-39.1  

  L             





[facility] CenterMonocytes (%) (Auto)2018 13:32:00* 



             Test Item    Value        Reference Range Interpretation Comments

 

             Monocytes (%) (Auto) (test code = 5905-5) 6.9          4.4-11.3    

               





[facility] CenterEosinophils (%) (Auto)2018 13:32:00
  * 



             Test Item    Value        Reference Range Interpretation Comments

 

             Eosinophils (%) (Auto) (test code = 713-8) 2.0          0.0-6.0    

                





[facility] CenterBasophils (%) (Auto)2018 13:32:00* 



             Test Item    Value        Reference Range Interpretation Comments

 

             Basophils (%) (Auto) (test code = 706-2) 0.3          0.0-1.0      

              





[facility] CenterIM GRANULOCYTES %2018 13:32:00* 



             Test Item    Value        Reference Range Interpretation Comments

 

             IM GRANULOCYTES % (test code = IM GRANULOCYTES %) 0.3          0.0-

1.0                    





[facility] CenterNeutrophils # (Auto)2018 13:32:00* 



             Test Item    Value        Reference Range Interpretation Comments

 

             Neutrophils # (Auto) (test code = 751-8) 4.5          2.1-6.9      

              





[facility] CenterLymphocytes # (Auto)2018 13:32:00* 



             Test Item    Value        Reference Range Interpretation Comments

 

             Lymphocytes # (Auto) (test code = 14081-3) 1.0          1.0-3.2    

                





[facility] CenterMonocytes # (Auto)2018 13:32:00* 



             Test Item    Value        Reference Range Interpretation Comments

 

             Monocytes # (Auto) (test code = 742-7) 0.4          0.2-0.8        

            





[facility] CenterEosinophils # (Auto)2018 13:32:00* 



             Test Item    Value        Reference Range Interpretation Comments

 

             Eosinophils # (Auto) (test code = 711-2) 0.1          0.0-0.4      

              





[facility] CenterBasophils # (Auto)2018 13:32:00* 



             Test Item    Value        Reference Range Interpretation Comments

 

             Basophils # (Auto) (test code = 704-7) 0.0          0.0-0.1        

            





[facility] CenterAbsolute Immature Granulocyte (auto
2018 13:32:00* 



             Test Item    Value        Reference Range Interpretation Comments

 

                                        Absolute Immature Granulocyte (auto (andrade

t code = Absolute Immature Granulocyte 

(auto)          0.02            0-0.1                            





[facility] Lfqapnu4097-61-19 11:47:00* 



             Test Item    Value        Reference Range Interpretation Comments

 

             Bedside Glucose (test code = 61002-0) 105                    

           





[id number]
2018 11:47:00* 



             Test Item    Value        Reference Range Interpretation Comments

 

             Bedside Glucose (test code = 12375-9) 105                    

           





[facility] ID: [id number]ucose
2018 11:47:00* 



             Test Item    Value        Reference Range Interpretation Comments

 

             Bedside Glucose (test code = 80960-1) 105                    

           





Meter ID: [id number] Medical CenterClostridium Difficile
Toxin A & -72-60 20:22:00* 



             Test Item    Value        Reference Range Interpretation Comments

 

             Clostridium Difficile Toxin A & B (test code = 308335252) NEGATIVE 

    NEGATIVE                   





Testing on stool aspirate specimens is outside  claims since specime
n type not validated on this assay.[facility]
Clostridium Difficile Toxin A & -55-80 20:22:00* 



             Test Item    Value        Reference Range Interpretation Comments

 

             Clostridium Difficile Toxin A & B (test code = 623354979) NEGATIVE 

    NEGATIVE                   





Testing on stool aspirate specimens is outside  claims since specime
n type not validated on this assay.[facility]
Clostridium Difficile Toxin A & -54-35 20:22:00* 



             Test Item    Value        Reference Range Interpretation Comments

 

             Clostridium Difficile Toxin A & B (test code = 846483166) NEGATIVE 

    NEGATIVE                   





Testing on stool aspirate specimens is outside  claims since specime
n type not validated on this assay.[facility]
Erythrocyte Sedimentation Okhw8430-33-29 09:29:00* 



             Test Item    Value        Reference Range Interpretation Comments

 

             Erythrocyte Sedimentation Rate (test code = 4537-7) 8            0-

20                       





[facility] CenterErythrocyte Sedimentation Rjzq2729-43-62 
09:29:00* 



             Test Item    Value        Reference Range Interpretation Comments

 

             Erythrocyte Sedimentation Rate (test code = 4537-7) 8            0-

20                       





[facility] CenterErythrocyte Sedimentation Torp8324-41-28 
09:29:00* 



             Test Item    Value        Reference Range Interpretation Comments

 

             Erythrocyte Sedimentation Rate (test code = 4537-7) 8            0-

20                       





[facility] CenterHemoglobin A1c Skouxlw0160-09-03 07:35:00
  * 



             Test Item    Value        Reference Range Interpretation Comments

 

             Hemoglobin A1c Percent (test code = Hemoglobin A1c Percent) 5.0    

      4.0-7.0                    





[facility] CenterHemoglobin A1c Mhgvmzx6309-03-05 07:35:00
  * 



             Test Item    Value        Reference Range Interpretation Comments

 

             Hemoglobin A1c Percent (test code = Hemoglobin A1c Percent) 5.0    

      4.0-7.0                    





[facility] CenterHemoglobin A1c Lwqxmwb9031-45-63 07:35:00
  * 



             Test Item    Value        Reference Range Interpretation Comments

 

             Hemoglobin A1c Percent (test code = Hemoglobin A1c Percent) 5.0    

      4.0-7.0                    





[facility] Centerodium Luknu4636-70-70 07:00:00* 



             Test Item    Value        Reference Range Interpretation Comments

 

             Sodium Level (test code = 2951-2) 135          136-145      L      

       





[facility] CenterPotassium Ozjeb4019-74-97 07:00:00* 



             Test Item    Value        Reference Range Interpretation Comments

 

             Potassium Level (test code = 2823-3) 4.2          3.5-5.1          

          





[facility] CenterChloride Vzoru4920-78-15 07:00:00* 



             Test Item    Value        Reference Range Interpretation Comments

 

             Chloride Level (test code = 2075-0) 106                      

         





[facility] CenterCarbon Dioxide Ddsvi7083-37-95 07:00:00* 



             Test Item    Value        Reference Range Interpretation Comments

 

             Carbon Dioxide Level (test code = 2028-9) 22           22-29       

               





[facility] CenterAnion Uty5628-76-83 07:00:00* 



             Test Item    Value        Reference Range Interpretation Comments

 

             Anion Gap (test code = 33037-3) 11.2         8-16                  

     





[facility] CenterBlood Urea Rpqexili1206-68-94 07:00:00* 



             Test Item    Value        Reference Range Interpretation Comments

 

             Blood Urea Nitrogen (test code = 3094-0) 8            7-26         

              





[facility] CenterCreatinine2018-02-19 07:00:00* 



             Test Item    Value        Reference Range Interpretation Comments

 

             Creatinine (test code = 2160-0) 0.71         0.57-1.11             

     





[facility] CenterBUN/Creatinine Djbsm4782-90-94 07:00:00* 



             Test Item    Value        Reference Range Interpretation Comments

 

             BUN/Creatinine Ratio (test code = 3097-3) 11           6-25        

               





[facility] CenterEstimat Glomerular Filtration Rate
2018 07:00:00* 



             Test Item    Value        Reference Range Interpretation Comments

 

             Estimat Glomerular Filtration Rate (test code = 52994-8) 60-       

   >60                        





Ranges were taken from the National Kidney Disease Education Program and the [facility] [facility] Kid[facility] Foundation literature.Reference ranges:60 or greater: Ldvnlk50-83 (
for 3 consecutive months): Chronic kidney disease 15 or less: Kidney failureBaylor Scott & White McLane Children's Medical CenterGlucose Mlmek7683-26-02 07:00:00* 



             Test Item    Value        Reference Range Interpretation Comments

 

             Glucose Level (test code = DIN4215) 91                       

         





[facility] CenterCalcium Mijyn0206-64-21 07:00:00* 



             Test Item    Value        Reference Range Interpretation Comments

 

             Calcium Level (test code = 96123-8) 8.4          8.4-10.2          

         





[facility] CenterTotal Wwcqrmoko5157-12-20 07:00:00* 



             Test Item    Value        Reference Range Interpretation Comments

 

             Total Bilirubin (test code = 1975-2) 0.5          0.2-1.2          

          





[facility] CenterAspartate Amino Transf (AST/SGOT)
2018 07:00:00* 



             Test Item    Value        Reference Range Interpretation Comments

 

                                        Aspartate Amino Transf (AST/SGOT) (test 

code = Aspartate Amino Transf 

(AST/SGOT))     18              5-34                             





[facility] CenterAlanine Aminotransferase (ALT/SGPT)
2018 07:00:00* 



             Test Item    Value        Reference Range Interpretation Comments

 

             Alanine Aminotransferase (ALT/SGPT) (test code = 1742-6) 8         

   0-55                       





[facility] CenterTotal Nliulsn4739-22-24 07:00:00* 



             Test Item    Value        Reference Range Interpretation Comments

 

             Total Protein (test code = 2885-2) 6.6          6.5-8.1            

        





[facility] CenterAlbumin2018-02-19 07:00:00* 



             Test Item    Value        Reference Range Interpretation Comments

 

             Albumin (test code = 1751-7) 3.4          3.5-5.0      L           

  





[facility] CenterGlobulin2018-02-19 07:00:00* 



             Test Item    Value        Reference Range Interpretation Comments

 

             Globulin (test code = 09422-0) 3.2          2.3-3.5                

    





[facility] CenterAlbumin/Globulin Dvpgd7895-25-42 07:00:00
  * 



             Test Item    Value        Reference Range Interpretation Comments

 

             Albumin/Globulin Ratio (test code = 1759-0) 1.1          0.8-2.0   

                 





[facility] CenterAlkaline Mvmgjknkvbl7432-34-42 07:00:00* 



             Test Item    Value        Reference Range Interpretation Comments

 

             Alkaline Phosphatase (test code = 6768-6) 68                 

               





[facility] CenterWhite Blood Ljfpg9358-84-17 06:25:00* 



             Test Item    Value        Reference Range Interpretation Comments

 

             White Blood Count (test code = 6690-2) 3.66         4.8-10.8     L 

            





[facility] CenterRed Blood Njqul7135-41-34 06:25:00* 



             Test Item    Value        Reference Range Interpretation Comments

 

             Red Blood Count (test code = 789-8) 4.06         3.6-5.1           

         





[facility] CenterHemoglobin2018-02-19 06:25:00* 



             Test Item    Value        Reference Range Interpretation Comments

 

             Hemoglobin (test code = 20805-4) 10.7         12.0-16.0    L       

      





[facility] CenterHematocrit2018-02-19 06:25:00* 



             Test Item    Value        Reference Range Interpretation Comments

 

             Hematocrit (test code = 4544-3) 34.9         34.2-44.1             

     





[facility] CenterMean Corpuscular Xcfppf9668-90-08 
06:25:00* 



             Test Item    Value        Reference Range Interpretation Comments

 

             Mean Corpuscular Volume (test code = 787-2) 86.0         81-99     

                 





[facility] CenterMean Corpuscular Fajoiqeuvw2207-68-99 
06:25:00* 



             Test Item    Value        Reference Range Interpretation Comments

 

             Mean Corpuscular Hemoglobin (test code = 785-6) 26.4         28-32 

       L             





[facility] CenterMean Corpuscular Hemoglobin Concent
2018 06:25:00* 



             Test Item    Value        Reference Range Interpretation Comments

 

             Mean Corpuscular Hemoglobin Concent (test code = 786-4) 30.7       

  31-35        L             





[facility] CenterRed Cell Distribution Qqgls3422-89-61 
06:25:00* 



             Test Item    Value        Reference Range Interpretation Comments

 

             Red Cell Distribution Width (test code = 26632-2) 15.3         11.7

-14.4    H             





[facility] CenterPlatelet Fumov2889-27-44 06:25:00* 



             Test Item    Value        Reference Range Interpretation Comments

 

             Platelet Count (test code = 777-3) 167          140-360            

        





[facility] CenterNeutrophils (%) (Auto)2018 06:25:00
  * 



             Test Item    Value        Reference Range Interpretation Comments

 

             Neutrophils (%) (Auto) (test code = 02531-9) 52.3         38.7-80.0

                  





[facility] CenterLymphocytes (%) (Auto)2018 06:25:00
  * 



             Test Item    Value        Reference Range Interpretation Comments

 

             Lymphocytes (%) (Auto) (test code = 736-9) 33.3         18.0-39.1  

                





[facility] CenterMonocytes (%) (Auto)2018 06:25:00* 



             Test Item    Value        Reference Range Interpretation Comments

 

             Monocytes (%) (Auto) (test code = 5905-5) 11.7         4.4-11.3    

 H             





[facility] CenterEosinophils (%) (Auto)2018 06:25:00
  * 



             Test Item    Value        Reference Range Interpretation Comments

 

             Eosinophils (%) (Auto) (test code = 713-8) 1.6          0.0-6.0    

                





[facility] CenterBasophils (%) (Auto)2018 06:25:00* 



             Test Item    Value        Reference Range Interpretation Comments

 

             Basophils (%) (Auto) (test code = 706-2) 0.8          0.0-1.0      

              





[facility] CenterIM GRANULOCYTES %2018 06:25:00* 



             Test Item    Value        Reference Range Interpretation Comments

 

             IM GRANULOCYTES % (test code = IM GRANULOCYTES %) 0.3          0.0-

1.0                    





[facility] CenterNeutrophils # (Auto)2018 06:25:00* 



             Test Item    Value        Reference Range Interpretation Comments

 

             Neutrophils # (Auto) (test code = 751-8) 1.9          2.1-6.9      

L             





[facility] CenterLymphocytes # (Auto)2018 06:25:00* 



             Test Item    Value        Reference Range Interpretation Comments

 

             Lymphocytes # (Auto) (test code = 71981-9) 1.2          1.0-3.2    

                





[facility] CenterMonocytes # (Auto)2018 06:25:00* 



             Test Item    Value        Reference Range Interpretation Comments

 

             Monocytes # (Auto) (test code = 742-7) 0.4          0.2-0.8        

            





[facility] CenterEosinophils # (Auto)2018 06:25:00* 



             Test Item    Value        Reference Range Interpretation Comments

 

             Eosinophils # (Auto) (test code = 711-2) 0.1          0.0-0.4      

              





[facility] CenterBasophils # (Auto)2018 06:25:00* 



             Test Item    Value        Reference Range Interpretation Comments

 

             Basophils # (Auto) (test code = 704-7) 0.0          0.0-0.1        

            





[facility] CenterAbsolute Immature Granulocyte (auto
2018 06:25:00* 



             Test Item    Value        Reference Range Interpretation Comments

 

                                        Absolute Immature Granulocyte (auto (andrade

t code = Absolute Immature Granulocyte 

(auto)          0.01            0-0.1                            





[facility] CenterUrine [id number] 21:43:00* 



             Test Item    Value        Reference Range Interpretation Comments

 

             Urine WBC (test code = 5821-4) 0-5          0-5                    

    





[facility] CenterUrine [id number] 21:43:00* 



             Test Item    Value        Reference Range Interpretation Comments

 

             Urine RBC (test code = 12842-6) 0-5          0-5                   

     





[facility] CenterUrine Ipvxtkeb4556-65-80 21:43:00* 



             Test Item    Value        Reference Range Interpretation Comments

 

             Urine Bacteria (test code = 47871-3) MODERATE     NONE         H   

          





[facility] CenterUrine Epithelial Kiofb3698-39-96 21:43:00
  * 



             Test Item    Value        Reference Range Interpretation Comments

 

             Urine Epithelial Cells (test code = 12655-0) RARE         NONE     

                  





[facility] CenterUrine Yyrxv1186-00-63 21:36:00* 



             Test Item    Value        Reference Range Interpretation Comments

 

             Urine Color (test code = 5778-6) YELLOW       YELLOW               

      





[facility] CenterUrine Hgvlmnm4059-26-21 21:36:00* 



             Test Item    Value        Reference Range Interpretation Comments

 

             Urine Clarity (test code = 00686-2) CLEAR        CLEAR             

         





[facility] CenterUrine Specific Atvjkkt7296-57-29 21:36:00
  * 



             Test Item    Value        Reference Range Interpretation Comments

 

             Urine Specific Gravity (test code = 5811-5) 1.015        1.010-1.02

5                





[facility] CenterUrine [id number] 21:36:00* 



             Test Item    Value        Reference Range Interpretation Comments

 

             Urine pH (test code = 00831-9) 6            5-7                    

    





[facility] CenterUrine Leukocyte Mzwgnwqy3527-00-21 
21:36:00* 



             Test Item    Value        Reference Range Interpretation Comments

 

             Urine Leukocyte Esterase (test code = 5799-2) NEGATIVE     NEGATIVE

                   





[facility] CenterUrine Qnctdjr5566-04-34 21:36:00* 



             Test Item    Value        Reference Range Interpretation Comments

 

             Urine Nitrite (test code = 64407-8) NEGATIVE     NEGATIVE          

         





[facility] CenterUrine Wwpcjyk7482-58-51 21:36:00* 



             Test Item    Value        Reference Range Interpretation Comments

 

             Urine Protein (test code = 5804-0) NEGATIVE     NEGATIVE           

        





[facility] CenterUrine Glucose (UA)2018 21:36:00* 



             Test Item    Value        Reference Range Interpretation Comments

 

             Urine Glucose (UA) (test code = 2349-9) NEGATIVE     NEGATIVE      

             





[facility] CenterUrine Lmavitq9058-91-64 21:36:00* 



             Test Item    Value        Reference Range Interpretation Comments

 

             Urine Ketones (test code = 83747-9) NEGATIVE     NEGATIVE          

         





[facility] Qiliaffvkuqp5237-80-74 21:36:00* 



             Test Item    Value        Reference Range Interpretation Comments

 

             Urine Urobilinogen (test code = 17893-8) 0.2          0.2-1        

              





[facility] CenterUrine Bhybupgfn3336-81-09 21:36:00* 



             Test Item    Value        Reference Range Interpretation Comments

 

             Urine Bilirubin (test code = 1978-6) NEGATIVE     NEGATIVE         

          





[facility] CenterUrine Rhlbk1028-29-07 21:36:00* 



             Test Item    Value        Reference Range Interpretation Comments

 

             Urine Blood (test code = 27611-2) TRACE        NEGATIVE     H      

       





[facility] CenterCreatine Vkkzbc4004-97-81 18:47:00* 



             Test Item    Value        Reference Range Interpretation Comments

 

             Creatine Kinase (test code = 2157-6) 87                      

          





[facility] CenterCreatine Kinase [id number] 18:47:00* 



             Test Item    Value        Reference Range Interpretation Comments

 

             Creatine Kinase MB (test code = 64557-2) 1.00         0-5.0        

              





[facility] CenterTroponin -76-67 18:47:00* 



             Test Item    Value        Reference Range Interpretation Comments

 

             Troponin I (test code = QJE7330) -0.00        0.0-0.78     L       

      





[facility] CenterCT ABDOMEN/PELVIS WO    [facility]   [address] [address]      Patient Name: JOAN RUIZ   MR #: [id number]    : 1955 
Age/Sex: 63/F  Acct #: [id number] Req #: [id number]  Adm Physician:     
Ordered by: CARY NESS MD  Report #: [id number]   Location: ER  Room/B
ed:     ________________________________________________________________________
___________________________    Procedure: [id number] CT/CT ABDOMEN/PELVIS MARILYN Gamez
 Date: 18                            Exam Time: 0500       REPORT STATUS:
Signed    EXAM: CT ABDOMEN AND PELVIS without IV CONTRAST   INDICATION:  Abdomi
nal pain, nausea and vomiting   COMPARISON:  CT of the abdomen and pelvis 2018   TECHNIQUE: The abdomen and pelvis were scanned using a multidetect
or helical   scanner. Coronal and sagittal reformations were obtained. Routine p
rotocol   performed.   IV Contrast: None   Oral Contrast: Gastrografin   CTDIvol
has been reviewed. It is below the limits set by the Radiation Protocol   Commi
ttee (RPC).      FINDINGS:   LOWER THORAX: No consolidations      LIVER: No mass
es   BILIARY: Cholecystectomy. No abnormal ductal dilation.       SPLEEN: No mas
ses   PANCREAS: No masses      ADRENALS: No nodules      RIGHT KIDNEY: No nephro
ureterolithiasis or hydronephrosis.       LEFT KIDNEY: No nephroureterolithiasis
or hydronephrosis.      GI TRACT: No wall thickening or obstruction. Surgical c
hanges of gastric   bypass. Surgical sutures around the sigmoid colon. Normal ap
pendix.      VESSELS:  Mild atherosclerotic changes of the abdominal aorta witho
ut aneurysm.   PERITONEUM/RETROPERITONEUM: No free air or fluid   LYMPH NODES: N
o lymphadenopathy      REPRODUCTIVE ORGANS: Not visualized   BLADDER: Normal    
 SOFT TISSUES: Normal   BONES: No suspicious bone lesions.      IMPRESSION:   No
acute findings.   No bowel obstruction.      Signed by: Dr. Mat Nugent M.D. on 2018 5:20 AM        Dictated By: MAT NUGENT MD  Electronically 
Signed By: MAT NUGENT MD on 18  Transcribed By: KACEY on 520       COPY TO:   CARY NESS MD        CT ABDOMEN/PELVIS W    [address] [address]      Patient Name: JOAN RUIZ   MR #: [id number]    : 1955 
Age/Sex: 62/F  Acct #: [id number] Req #: [id number]  Adm Physician:     
Ordered by: CHRISTIAN UGARTE MD  Report #: [id number]   Location: ER  Room/Bed:
    ___________________________________________________________________________
________________________    Procedure: [id number] CT/CT ABDOMEN/PELVIS W  Exam Da
te: 18                            Exam Time:        REPORT STATUS: Sig
donnie    EXAM: CT ABDOMEN/PELVIS W   DATE: 2018 6:05 PM     INDICATION:  Abdo
gary pain   COMPARISON:  2016   TECHNIQUE: The abdomen and pelvis were sca
nned using a multidetector helical   scanner. Coronal and sagittal reformations 
were obtained. Routine protocol   performed.   IV Contrast: 100 ml Isovue 370   
  FINDINGS:   LOWER THORAX: Mild bibasilar atelectasis.      LIVER/BILIARY: Sta
ble low-density right liver lesion, likely benign. Unchanged   mild biliary duct
al dilation.      GALLBLADDER: Cholecystectomy.   SPLEEN: Unremarkable   PANCREA
S: Unremarkable      ADRENALS: No nodules   KIDNEYS: Symmetric perfusion. Stable
subcentimeter probable left inferior renal   cyst. No hydronephrosis.      GI T
RACT: Postsurgical changes are seen status post gastric bypass and prior   sigmo
idectomy. No evidence of obstruction or wall thickening. Normal appendix.      V
ESSELS: Mild to moderate atherosclerotic changes.   PERITONEUM/RETROPERITONEUM: 
No free air or fluid   LYMPH NODES: No lymphadenopathy      REPRODUCTIVE ORGANS/
BLADDER: Hysterectomy. Bladder is mildly distended but   otherwise unremarkable.
     SOFT TISSUES: Unremarkable   BONES: There are scattered degenerative villafuerte
es, worse at L5-S1.      IMPRESSION:   No acute abnormality.          Signed by:
Dr Daysi Stroud MD on 2018 8:43 PM        Dictated By: DAYSI STROUD MD
 Electronically Signed By: DAYSI STROUD MD on 18  Transcribed By: 
KACEY on 18       COPY TO:   CHRISTIAN UGARTE MD

## 2020-06-06 NOTE — XMS REPORT
Clinical Summary

                             Created on: 2020



Renetta Funez

External Reference #: [id number]

: 1955

Sex: Female



Demographics





                          Address                   [address] [place] [place]  [address]

 

                          Home Phone                [phone]

 

                          Preferred Language        English

 

                          Marital Status            

 

                          Judaism Affiliation     Unknown

 

                          Race                      White

 

                          Ethnic Group              /Latin





Author





                          Author                    Juan [facility]

 

                          Organization              [facility]

 

                          Address                   Unknown

 

                          Phone                     Unavailable







Support





                Name            Relationship    Address         Phone

 

                Humza Patrick ECON            Unknown         [phone]







Care Team Providers





                    Care Team Member Name Role                Phone

 

                    Asked, No Pcp       PCP                 Unavailable







Allergies





                                        Comments



                 Active Allergy  Reactions       Severity        Noted Date 

 

                                        



Blisters



                     Morphine            Hives               2018 







Medications





                          End Date                  Status



              Medication   Sig          Dispensed    Refills      Start  



                                         Date  

 

                                                    Active



                     clonAZEPAM (KlonoPIN) 1  Take 1 mg by        0   



                           MG tablet                 mouth every 8     



                                         (eight) hours     



                                         as needed for     



                                         anxiety.     

 

                                                    Active



                     clonAZEPAM (KlonoPIN) 2  Take 2 mg by        0   



                           MG tablet                 mouth every 8     



                                         (eight) hours     



                                         as needed for     



                                         anxiety.     

 

                                                    Active



                     diclofenac (VOLTAREN) 1 %  Apply 1             0   



                           gel                       application     



                                         topically     



                                         every 6 (six)     



                                         hours as     



                                         needed     



                                         (pain).     

 

                                                    Active



                     HYDROcodone-acetaminophen  Take 1 tablet       0   



                           (NORCO)  mg per     by mouth     



                           tablet                    every 8     



                                         (eight) hours     



                                         as needed for     



                                         moderate     



                                         pain.     

 

                                                    Active



                     promethazine-codeine  Take 5 mL by        0   



                           (PHENERGAN with CODEINE)  mouth every 6     



                           6.25-10 mg/5 mL syrup     (six) hours     



                                         as needed for     



                                         cough.     

 

                                                    Active



                     zolpidem (AMBIEN) 10 mg  Take 10 mg by       0   



                           tablet                    mouth nightly     



                                         as needed for     



                                         sleep.     

 

                                                    Active



                     cholecalciferol, vitamin  Take 1,000          0   



                           D3, (VITAMIN D3) 1,000    Units by     



                           unit tablet               mouth daily.     

 

                                                    Active



                     ascorbic acid, vitamin C,  Take 500 mg         0   



                           (VITAMIN C) 500 MG tablet  by mouth     



                                         daily.     

 

                                                    Active



                     TURMERIC ROOT EXTRACT  Take 1 tablet       0   



                           ORAL                      by mouth     



                                         daily.     







Active Problems





 



                           Problem                   Noted Date

 

 



                           Pneumonia of right middle lobe due to infectious orga

nism  2018

 

 



                           Atypical pneumonia        2018







Social History





                                        Date



                 Tobacco Use     Types           Packs/Day       Years Used 

 

                                         



                                         Never Smoker    

 

    



                                         Smokeless Tobacco: Never   



                                         Used   







                    Drinks/Week         oz/Week             Comments



                                         Alcohol Use   

 

                                                             



                                         No   







 



                           Sex Assigned at Birth     Date Recorded

 

 



                                         Not on file 







                                        Industry



                           Job Start Date            Occupation 

 

                                        Not on file



                           Not on file               Not on file 







                                        Travel End



                           Travel History            Travel Start 

 





                                         No recent travel history available.







Last Filed Vital Signs

Not on file



Plan of Treatment





   



                 Health Maintenance  Due Date        Last Done       Comments

 

   



                           BREAST CANCER SCREENING   2003  

 

   



                           COLONOSCOPY SCREENING     2003  

 

   



                           SHINGLES VACCINES (#1)    2003  

 

   



                           65+ PNEUMOCOCCAL VACCINE  2018  



                                         (1 of 2 - PCV13)   

 

   



                           INFLUENZA VACCINE         2020  







Results

Not on fileafter 2019



Insurance





                                        Type



            Payer      Benefit    Subscriber ID  Effective  Phone      Address 



                           Plan /                    Dates   



                                         Group     

 

                                        HMO



                 [employer]  [employer]           xxxxxxxxx       2018-P 

  



                           [facility]                resent   



                                         [facility]     



                                         AD[facility]     







     



            Guarantor Name  Account    Relation to  Date of    Phone      Billin

g Address



                     Type                Patient             Birth  

 

     



            Renetta Funez  Personal/F  Self       1955  [phone]  [address]

[address]



                     [address]               (Home)              [address]



                                         [place] [address]







Advance Directives





For more information, please contact: [phone]







                          Patient Representative    Explanation



                           Type                      Date Recorded  

 

                                                     



                                         Advance Directives,   



                                         Living Will and   



                                         Medical Power of   



                                            











                          Date Inactivated          Comments



                           Code Status               Date Activated  

 

                          3/6/2018  1:55 PM          



                           Full Code                 3/6/2018  7:39 AM  







  



                           Code Status decision reached by:  Patient

## 2020-06-06 NOTE — DIAGNOSTIC IMAGING REPORT
Perfusion Lung Scan



NOTE: Lung ventilation studies with xenon are not being performed per the

recommendation of the Society of Nuclear Medicine and Molecular Imaging.  It is

not possible to be certain that the ventilation system is adequately

disinfected.  Ventilation studies with Tc-99m DTPA particles is contraindicated

because the delivery by nebulization generates too many water droplets from the

patient's airway. 

 

Clinical Information: 65 F with left-sided chest pain



Comparison: Chest radiograph 6/6/2020



Discussion:  Ventilation images were not obtained.  See note above.



Perfusion images of the lungs were obtained in multiple projections following

intravenous administration of approximately 6 mCi of Tc-99m MAA. Distribution

of tracer is irregular throughout the lungs,  There are no segmental perfusion

defects of any size.  



The cardiomediastinal silhouette is unremarkable.



Impression: 



1.  Scan findings represent a VERY LOW probability for acute pulmonary embolic

disease based on the perfusion only PIOPED criteria.  Ventilation imaging would

not change the assigned probability.

2.  Scan findings are compatible with diffuse parenchymal and/or obstructive

lung disease. 



Signed by: Dr. Selene Zapata M.D. on 6/6/2020 11:33 PM

## 2020-06-06 NOTE — DIAGNOSTIC IMAGING REPORT
EXAMINATION:  CHEST SINGLE (PORTABLE)   



COMPARISON: Chest x-ray 3/22/2019



INDICATION:  

^POSS ASPIRATION

^[id number]

^1830  



DISCUSSION:

Frontal view of the chest obtained at 1757 hours.



HEART AND MEDIASTINUM:  The heart is normal in size of cord is tortuous

calcifications of the arch

  

LINES:  None.



LUNGS:  The lungs are well inflated and clear. No pneumonia or pulmonary edema.



PLEURA:  No pleural effusion or pneumothorax.



BONES AND SOFT TISSUES:  No focal osseous lesion. The soft tissues are normal.





IMPRESSION: 

No acute cardiopulmonary disease.



Signed by: Dr. Jaelyn Nails MD on 6/6/2020 7:24 PM

## 2020-06-07 VITALS — SYSTOLIC BLOOD PRESSURE: 140 MMHG | DIASTOLIC BLOOD PRESSURE: 78 MMHG

## 2020-11-25 ENCOUNTER — HOSPITAL ENCOUNTER (EMERGENCY)
Dept: HOSPITAL 88 - ER | Age: 65
Discharge: HOME | End: 2020-11-25
Payer: MEDICARE

## 2020-11-25 VITALS — SYSTOLIC BLOOD PRESSURE: 188 MMHG | DIASTOLIC BLOOD PRESSURE: 76 MMHG

## 2020-11-25 VITALS — HEIGHT: 64 IN | WEIGHT: 180 LBS | BODY MASS INDEX: 30.73 KG/M2

## 2020-11-25 DIAGNOSIS — N39.0: ICD-10-CM

## 2020-11-25 DIAGNOSIS — R11.2: ICD-10-CM

## 2020-11-25 DIAGNOSIS — R10.32: Primary | ICD-10-CM

## 2020-11-25 DIAGNOSIS — K21.9: ICD-10-CM

## 2020-11-25 DIAGNOSIS — R33.9: ICD-10-CM

## 2020-11-25 LAB
ALBUMIN SERPL-MCNC: 4.3 G/DL (ref 3.5–5)
ALBUMIN/GLOB SERPL: 1.2 {RATIO} (ref 0.8–2)
ALP SERPL-CCNC: 107 IU/L (ref 40–150)
ALT SERPL-CCNC: 13 IU/L (ref 0–55)
AMYLASE SERPL-CCNC: 82 U/L (ref 25–125)
ANION GAP SERPL CALC-SCNC: 16.3 MMOL/L (ref 8–16)
BACTERIA URNS QL MICRO: (no result) /HPF
BASOPHILS # BLD AUTO: 0 10*3/UL (ref 0–0.1)
BASOPHILS NFR BLD AUTO: 0.1 % (ref 0–1)
BILIRUB UR QL: NEGATIVE
BUN SERPL-MCNC: 7 MG/DL (ref 7–26)
BUN/CREAT SERPL: 11 (ref 6–25)
CALCIUM SERPL-MCNC: 8.8 MG/DL (ref 8.4–10.2)
CHLORIDE SERPL-SCNC: 100 MMOL/L (ref 98–107)
CLARITY UR: (no result)
CO2 SERPL-SCNC: 23 MMOL/L (ref 22–29)
COLOR UR: YELLOW
DEPRECATED NEUTROPHILS # BLD AUTO: 6.4 10*3/UL (ref 2.1–6.9)
DEPRECATED RBC URNS MANUAL-ACNC: (no result) /HPF (ref 0–5)
EGFRCR SERPLBLD CKD-EPI 2021: > 60 ML/MIN (ref 60–?)
EOSINOPHIL # BLD AUTO: 0 10*3/UL (ref 0–0.4)
EOSINOPHIL NFR BLD AUTO: 0 % (ref 0–6)
EPI CELLS URNS QL MICRO: (no result) /LPF
ERYTHROCYTE [DISTWIDTH] IN CORD BLOOD: 15 % (ref 11.7–14.4)
GLOBULIN PLAS-MCNC: 3.7 G/DL (ref 2.3–3.5)
GLUCOSE SERPLBLD-MCNC: 145 MG/DL (ref 74–118)
HCT VFR BLD AUTO: 39.9 % (ref 34.2–44.1)
HGB BLD-MCNC: 12.6 G/DL (ref 12–16)
KETONES UR QL STRIP.AUTO: NEGATIVE
LEUKOCYTE ESTERASE UR QL STRIP.AUTO: (no result)
LIPASE SERPL-CCNC: 35 U/L (ref 8–78)
LYMPHOCYTES # BLD: 0.6 10*3/UL (ref 1–3.2)
LYMPHOCYTES NFR BLD AUTO: 8.4 % (ref 18–39.1)
MCH RBC QN AUTO: 25.9 PG (ref 28–32)
MCHC RBC AUTO-ENTMCNC: 31.6 G/DL (ref 31–35)
MCV RBC AUTO: 82.1 FL (ref 81–99)
MONOCYTES # BLD AUTO: 0.3 10*3/UL (ref 0.2–0.8)
MONOCYTES NFR BLD AUTO: 4.3 % (ref 4.4–11.3)
NEUTS SEG NFR BLD AUTO: 86.9 % (ref 38.7–80)
NITRITE UR QL STRIP.AUTO: NEGATIVE
PLATELET # BLD AUTO: 270 X10E3/UL (ref 140–360)
POTASSIUM SERPL-SCNC: 3.3 MMOL/L (ref 3.5–5.1)
PROT UR QL STRIP.AUTO: (no result)
RBC # BLD AUTO: 4.86 X10E6/UL (ref 3.6–5.1)
RENAL EPI CELLS URNS QL MICRO: (no result)
SODIUM SERPL-SCNC: 136 MMOL/L (ref 136–145)
SP GR UR STRIP: >=1.03 (ref 1.01–1.02)
UROBILINOGEN UR STRIP-MCNC: 0.2 MG/DL (ref 0.2–1)
WBC #/AREA URNS HPF: (no result) /HPF (ref 0–5)

## 2020-11-25 PROCEDURE — 85025 COMPLETE CBC W/AUTO DIFF WBC: CPT

## 2020-11-25 PROCEDURE — 74177 CT ABD & PELVIS W/CONTRAST: CPT

## 2020-11-25 PROCEDURE — 80053 COMPREHEN METABOLIC PANEL: CPT

## 2020-11-25 PROCEDURE — 51702 INSERT TEMP BLADDER CATH: CPT

## 2020-11-25 PROCEDURE — 99284 EMERGENCY DEPT VISIT MOD MDM: CPT

## 2020-11-25 PROCEDURE — 87186 SC STD MICRODIL/AGAR DIL: CPT

## 2020-11-25 PROCEDURE — 51700 IRRIGATION OF BLADDER: CPT

## 2020-11-25 PROCEDURE — 87086 URINE CULTURE/COLONY COUNT: CPT

## 2020-11-25 PROCEDURE — 82150 ASSAY OF AMYLASE: CPT

## 2020-11-25 PROCEDURE — 81001 URINALYSIS AUTO W/SCOPE: CPT

## 2020-11-25 PROCEDURE — 83690 ASSAY OF LIPASE: CPT

## 2020-11-25 PROCEDURE — 36415 COLL VENOUS BLD VENIPUNCTURE: CPT

## 2020-11-25 NOTE — DIAGNOSTIC IMAGING REPORT
EXAM: CT Abdomen and Pelvis WITH contrast  

INDICATION:      

^ABD PAIN. VOMITING

^[id number]

^0436

^Y 

COMPARISON: CT dated 5/28/2018

TECHNIQUE: Abdomen and pelvis were scanned utilizing a multidetector helical

scanner from the lung base to the pubic symphysis after administration of IV

contrast. Coronal and sagittal reformations were obtained. Dose modulation,

iterative reconstruction, and/or weight based adjustment of the mA/kV was

utilized to reduce the radiation dose to as low as reasonably achievable.

Routine protocol was performed. Scan was performed when during portal venous

phase.    

     IV CONTRAST: 100 mL of Isovue-370

     ORAL CONTRAST: Gastroview

            

COMPLICATIONS: None



RADIATION DOSE:

     Total DLP: 586.33 mGy*cm

     Estimated effective dose: (DLP x 0.015 x size factor) mSv

     CTDIvol has been reviewed. It is below the limits set by the Radiation

Protocol Committee (RPC).



FINDINGS:



LINES and TUBES: None.



LOWER THORAX:  Unremarkable



HEPATOBILIARY:      No focal hepatic lesions. Mild biliary dilatation, likely

due to reservoir effect. 



GALLBLADDER: Surgically absent.



SPLEEN: No splenomegaly. 



PANCREAS: No focal masses or ductal dilatation.  



ADRENALS: No adrenal nodules    



KIDNEYS/URETERS: Kidneys enhance symmetrically.  No hydronephrosis. No renal

mass. Left renal subcentimeter hypodensities are too small to characterize.  No

stones.



GI TRACT: No abnormal distention or evidence of bowel obstruction.  Mild wall

thickening at small bowel anastomotic site (series 2, image 46).  Appendix is

absent. Evidence of gastric bypass surgery. Oral contrast within distal

esophagus, suggestive of gastroesophageal reflux.



PELVIC ORGANS/BLADDER: Unremarkable.



LYMPH NODES: No lymphadenopathy.



VESSELS: There is mild atherosclerotic disease in the aorta and major arterial

branches.



PERITONEUM / RETROPERITONEUM: No free air or fluid.



BONES: Degenerative changes of spine. L3 vertebral body hemangioma.



SOFT TISSUES: Unremarkable.            



IMPRESSION: 



1.  Mild nonspecific focal small bowel wall thickening at the anastomotic site.

2.  No evidence of bowel obstruction.



Signed by: Dr. Clovis Kelsey MD on 11/25/2020 5:31 AM

## 2020-11-25 NOTE — EMERGENCY DEPARTMENT NOTE
History of Present Illnes


History of Present Illness


Chief Complaint:  Abdominal Complaints


History of Present Illness


This is a 65 year old  female PRESENTS TO ED VIA POV C/O UMBILICAL/LLQ 

PAIN RADIATING TO FLANK AND


   BACK SINCE 0900 ON 11/24. PT REPORTS NAUSEA AND 10 EPISODE OF VOMITING ON


   11/24/20. PT WITH HX OF OBSTRUCTION.  .


Historian:  Patient


Arrival Mode:  Car


 Required:  No


Onset (how long ago):  day(s) (1)


Location:  PERIUMBILICAL


Quality:  PAIN


Radiation:  Reports back, Reports abdomen (LLQ)


Severity:  moderate


Onset quality:  sudden


Duration (how long):  day(s) (1)


Timing of current episode:  constant


Progression:  waxing and waning


Chronicity:  new


Context:  Denies recent illness, Denies recent surgery


Relieving factors:  none


Exacerbating factors:  none


Associated symptoms:  Reports nausea/vomiting


Treatments prior to arrival:  none





Past Medical/Family History


Physician Review


I have reviewed the patient's past medical and family history.  Any updates have

been documented here.





Past Medical History


Recent Fever:  No


Clinical Suspicion of Infectio:  No


New/Unexplained Change in Ment:  No


Past Medical History:  GERD, Osteoarthritis


Other Medical History:  


Diverliticulitis 





Bowel obstructions. 





Aspiration Pneumonia


Past Surgical History:  Cholecysctectomy, Appendectomy, Hysterectomy, Bariatric 

Surgery, Colon Resection


Other Surgery:  


Gastric Bypass 11 years ago





Bowel Resection x3





Carpal Tunnel





Social History


Smoking Cessation:  Never Smoker


Alcohol Use:  None


Any Illegal Drug Use:  No


Physically hurt or threatened:  No





Family History


Family history of heart diseas:  No





Other


Last Tetanus:  2011





Review of Systems


Review of Systems


Constitutional:  Reports no symptoms


EENTM:  Reports no symptoms


Cardiovascular:  Reports no symptoms


Respiratory:  Reports no symptoms


Gastrointestinal:  Reports as per HPI


Genitourinary:  Reports no symptoms


Musculoskeletal:  Reports no symptoms


Integumentary:  Reports no symptoms


Neurological:  Reports no symptoms


Psychological:  Reports no symptoms


Endocrine:  Reports no symptoms


Hematological/Lymphatic:  Reports no symptoms





Physical Exam


Related Data


Allergies:  


Coded Allergies:  


     No Known Allergies (Unverified , 6/6/20)


Triage Vital Signs





Vital Signs








  Date Time  Temp Pulse Resp B/P (MAP) Pulse Ox O2 Delivery O2 Flow Rate FiO2


 


11/25/20 03:06 99.5 80 20 190/89 99 Room Air  








Vital signs reviewed:  Yes





Physical Exam


CONSTITUTIONAL





Constitutional:  Present well-developed, Present well-nourished; 


   Absent distressed


HENT


HENT:  Present normocephalic, Present atraumatic, Present oropharynx 

clear/moist, Present nose normal


HENT L/R:  Present left ext ear normal, Present right ext ear normal


EYES





Eyes:  Reports PERRL, Reports conjunctivae normal


NECK


Neck:  Present ROM normal


PULMONARY


Pulmonary:  Present effort normal, Present breath sounds normal


CARDIOVASCULAR





Cardiovascular:  Present regular rhythm, Present heart sounds normal, Present 

capillary refill normal, Present normal rate


GASTROINTESTINAL





Abdominal:  Present soft, Present bowel sounds normal, Present tender (alexsandra 

umbilical, suprapubic)


GENITOURINARY





Genitourinary:  Present exam deferred


SKIN


Skin:  Present warm, Present dry


MUSCULOSKELETAL





Musculoskeletal:  Present ROM normal


NEUROLOGICAL





Neurological:  Present alert, Present oriented x 3, Present no gross motor or 

sensory deficits


PSYCHOLOGICAL


Psychological:  Present mood/affect normal, Present judgement normal





Results


Laboratory


Laboratory





Laboratory Tests








Test


 11/25/20


03:00


 


White Blood Count


 7.37 x10e3/uL


(4.8-10.8)


 


Red Blood Count


 4.86 x10e6/uL


(3.6-5.1)


 


Hemoglobin


 12.6 g/dL


(12.0-16.0)


 


Hematocrit


 39.9 %


(34.2-44.1)


 


Mean Corpuscular Volume


 82.1 fL


(81-99)


 


Mean Corpuscular Hemoglobin


 25.9 pg


(28-32)


 


Mean Corpuscular Hemoglobin


Concent 31.6 g/dL


(31-35)


 


Red Cell Distribution Width


 15.0 %


(11.7-14.4)


 


Platelet Count


 270 x10e3/uL


(140-360)


 


Neutrophils (%) (Auto)


 86.9 %


(38.7-80.0)


 


Lymphocytes (%) (Auto)


 8.4 %


(18.0-39.1)


 


Monocytes (%) (Auto)


 4.3  %


(4.4-11.3)


 


Eosinophils (%) (Auto)


 0.0 %


(0.0-6.0)


 


Basophils (%) (Auto)


 0.1 %


(0.0-1.0)


 


Neutrophils # (Auto) 6.4 (2.1-6.9) 


 


Lymphocytes # (Auto) 0.6 (1.0-3.2) 


 


Monocytes # (Auto) 0.3 (0.2-0.8) 


 


Eosinophils # (Auto) 0.0 (0.0-0.4) 


 


Basophils # (Auto) 0.0 (0.0-0.1) 


 


Absolute Immature Granulocyte


(auto 0.02 x10e3/uL


(0-0.1)


 


Urine Color


 Yellow


(YELLOW)


 


Urine Clarity Cloudy (CLEAR) 


 


Urine pH 7.5 (5 - 7) 


 


Urine Specific Gravity


 >=1.030


(1.010-1.025)


 


Urine Protein 1+ (NEGATIVE) 


 


Urine Glucose (UA)


 Negative


(NEGATIVE)


 


Urine Ketones


 Negative


(NEGATIVE)


 


Urine Blood 3+ (NEGATIVE) 


 


Urine Nitrite


 Negative


(NEGATIVE)


 


Urine Bilirubin


 Negative


(NEGATIVE)


 


Urine Urobilinogen


 0.2 mg/dL (0.2


- 1)


 


Urine Leukocyte Esterase 1+ (NEGATIVE) 


 


Urine RBC 0-5 /HPF (0-5) 


 


Urine WBC


 21-50 /HPF


(0-5)


 


Urine Epithelial Cells


 Few /LPF


(NONE)


 


Urine Renal Epithelial Cells Few (NONE) 


 


Urine Bacteria


 Many /HPF


(NONE)


 


Sodium Level


 136 mmol/L


(136-145)


 


Potassium Level


 3.3 mmol/L


(3.5-5.1)


 


Chloride Level


 100 mmol/L


()


 


Carbon Dioxide Level


 23 mmol/L


(22-29)


 


Anion Gap


 16.3 mmol/L


(8-16)


 


Blood Urea Nitrogen 7 mg/dL (7-26) 


 


Creatinine


 0.66 mg/dL


(0.57-1.11)


 


Estimat Glomerular Filtration


Rate > 60 ML/MIN


(60-)


 


BUN/Creatinine Ratio 11 (6-25) 


 


Glucose Level


 145 mg/dL


()


 


Calcium Level


 8.8 mg/dL


(8.4-10.2)


 


Total Bilirubin


 0.9 mg/dL


(0.2-1.2)


 


Aspartate Amino Transf


(AST/SGOT) 29 IU/L (5-34) 





 


Alanine Aminotransferase


(ALT/SGPT) 13 IU/L (0-55) 





 


Alkaline Phosphatase


 107 IU/L


()


 


Total Protein


 8.0 g/dL


(6.5-8.1)


 


Albumin


 4.3 g/dL


(3.5-5.0)


 


Globulin


 3.7 g/dL


(2.3-3.5)


 


Albumin/Globulin Ratio 1.2 (0.8-2.0) 


 


Amylase Level


 82 U/L


()


 


Lipase 35 U/L (8-78) 








Lab results reviewed:  Yes





Imaging


Imaging results reviewed:  Yes


Impressions


Procedure: [id number] CT/CT ABDOMEN/PELVIS W


Exam Date: 11/25/20                            Exam Time: 0436








                              REPORT STATUS: Signed





EXAM: CT Abdomen and Pelvis WITH contrast  


INDICATION:      


^ABD PAIN. VOMITING


^[id number]


^0436


^Y 


COMPARISON: CT dated 5/28/2018


TECHNIQUE: Abdomen and pelvis were scanned utilizing a multidetector helical


scanner from the lung base to the pubic symphysis after administration of IV


contrast. Coronal and sagittal reformations were obtained. Dose modulation,


iterative reconstruction, and/or weight based adjustment of the mA/kV was


utilized to reduce the radiation dose to as low as reasonably achievable.


Routine protocol was performed. Scan was performed when during portal venous


phase.    


     IV CONTRAST: 100 mL of Isovue-370


     ORAL CONTRAST: Gastroview


            


COMPLICATIONS: None





RADIATION DOSE:


     Total DLP: 586.33 mGy*cm


     Estimated effective dose: (DLP x 0.015 x size factor) mSv


     CTDIvol has been reviewed. It is below the limits set by the Radiation


Protocol Committee (RPC).





FINDINGS:





LINES and TUBES: None.





LOWER THORAX:  Unremarkable





HEPATOBILIARY:      No focal hepatic lesions. Mild biliary dilatation, likely


due to reservoir effect. 





GALLBLADDER: Surgically absent.





SPLEEN: No splenomegaly. 





PANCREAS: No focal masses or ductal dilatation.  





ADRENALS: No adrenal nodules    





KIDNEYS/URETERS: Kidneys enhance symmetrically.  No hydronephrosis. No renal


mass. Left renal subcentimeter hypodensities are too small to characterize.  No


stones.





GI TRACT: No abnormal distention or evidence of bowel obstruction.  Mild wall


thickening at small bowel anastomotic site (series 2, image 46).  Appendix is


absent. Evidence of gastric bypass surgery. Oral contrast within distal


esophagus, suggestive of gastroesophageal reflux.





PELVIC ORGANS/BLADDER: Unremarkable.





LYMPH NODES: No lymphadenopathy.





VESSELS: There is mild atherosclerotic disease in the aorta and major arterial


branches.





PERITONEUM / RETROPERITONEUM: No free air or fluid.





BONES: Degenerative changes of spine. L3 vertebral body hemangioma.





SOFT TISSUES: Unremarkable.            





IMPRESSION: 





1.  Mild nonspecific focal small bowel wall thickening at the anastomotic site.


2.  No evidence of bowel obstruction.





Signed by: Dr. Clovis Becerril MD on 11/25/2020 5:31 AM








Dictated By: CLOVIS BECERRIL MD


Electronically Signed By: CLOVIS BECERRIL MD on 11/25/20 0531


Transcribed By: KACEY on 11/25/20 0531 








COPY TO:   CARY NESS MD~





Assessment & Plan


Medical Decision Making


MDM


PT WITH ABD PAIN WITH H/O BOWEL OBSTRUCTIONS AND COLON RESECTION ALONG WITH 

OTHER ABD SURGERIES IN PAST





CBC, CMP, AMYLASE, LIPASE, UA, CT ABD/PELVIS ORDERED TO EVAL FOR LEUKOCYTOSIS, 

UTI, PANCREATITIS, DIVERTICULITIS, BOWEL OBSTRUCTION, BOWEL PERFORATION, 

COLITIS, KIDNEY STONE





MORPHINE 4 MG IV ORDERED


ZOFRAN 4 MG IV ORDERED


PROTONIX 40 MG IV ORDERED


1 LITER NS IV BOLUS ORDERED





ct scan reveals distended bladder, bladder scan showed 760, pt went to bathroom 

and only put out 250 cc urine, bladder scan repeated shows 500cc,


browning ordered 450 cc output





Assessment & Plan


Final Impression:  


(1) UTI (urinary tract infection)


(2) Abdominal pain


(3) Urinary retention


Depart Disposition:  HOME, SELF-CARE


Last Vital Signs











  Date Time  Temp Pulse Resp B/P (MAP) Pulse Ox O2 Delivery O2 Flow Rate FiO2


 


11/25/20 03:06 99.5 80 20 190/89 99 Room Air  








Home Meds


Active Scripts


Loperamide Hcl* (IMODIUM*) 2 Mg Cap, 2 MG PO Q6H PRN for diarrhea for 14 Days, 

CAP


   Prov:COCO MARTIN MD         2/20/18


Levofloxacin (LEVAQUIN) 500 Mg Tablet, 500 MG PO DAILY for 7 Days


   Prov:COCO MARTIN MD         2/20/18


Metronidazole (FLAGYL) 500 Mg Tablet, 500 MG PO TID for 7 Days


   Prov:COCO MARTIN MD         2/20/18


Tramadol Hcl* (ULTRAM 50MG*) 50 Mg Tab, 50 MG PO Q8H PRN for PAIN, #20 TAB


   Prov:COCO MARTIN MD         2/20/18


Reported Medications


Clonazepam (CLONAZEPAM) 1 Mg Tablet, 1 MG PO TID, TAB


   1/16/17


Esomeprazole Magnesium (NEXIUM) 40 Mg Capsule.dr, 40 MG PO DAILY


   PROTONIX THERAPEUTIC SUBSTITUTE FOR NEXIUM PER [facility]


   1/16/17


Metoprolol Succinate (METOPROLOL SUCCINATE) 25 Mg Tab.er.24h, 25 MG PO PRN


   11/12/16


Hydrocodone Bit/Acetaminophen (NORCO  TABLET) 1 Each Tablet, 10 MG Q4HR, 

#50


   10/2/15


Zolpidem Tartrate (AMBIEN) 5 Mg Tablet, 10 MG PO BEDTIME, TAB


   10/15/14


Medications in the ED





Pantoprazole Sodium 40 mg NOW  STAT IV ;  Start 11/25/20 at 03:06;  Stop 

11/25/20 at 03:12;  Status DC


Morphine Sulfate 4 mg NOW  STAT IV ;  Start 11/25/20 at 03:06;  Stop 11/25/20 at

03:11;  Status DC


Ondansetron HCl 4 mg NOW  STAT IV ;  Start 11/25/20 at 03:06;  Stop 11/25/20 at 

03:12;  Status DC











CARY NESS MD        Nov 25, 2020 03:20

## 2020-11-25 NOTE — NUR
bladder scan performed per md request. 760cc noted on bladder scan. md 
informed. pt instructed to atttempt to void. pt voided 250cc cloudy yellow 
urine. bladder scan repeated 509cc urine noted on bladder scan. md informed c 
browning catheter ordered.
No

## 2020-11-25 NOTE — XMS REPORT
Continuity of Care Document

                             Created on: 2020



JOAN RUIZ

External Reference #: [id number]

: 1955

Sex: Female



Demographics





                          Address                   [address]

[place]  [address]

 

                          Home Phone                [phone]

 

                          Preferred Language        English

 

                          Marital Status            Unknown

 

                          Zoroastrian Affiliation     Unknown

 

                          Race                      Unknown

 

                                        Additional Race(s) 

Other

White



 

                          Ethnic Group              /Latin





Author





                          Author                    [facility]

t

 

                          Organization              [facility]

 

                          Address                   [address]

[place]  [address]



 

                          Phone                     Unavailable







Support





                Name            Relationship    Address         Phone

 

                    GENEVA HURT   ECON                [address]

[address]

[place]  [address]                     [phone]

 

                    GENEVA HURT   PRS                 [address]

[place]  [address]                     [phone]

 

                    GENEVA HURT   PRS                 [address]

[address]

[place]  [address]                     [phone]

 

                    ELBERT RUIZ       PRS                 [address] [place]  [address]               [phone]

 

                    GENEVA HURT   ECON                [address] [place]  [address]                     Unavailable







Care Team Providers





                    Care Team Member Name Role                Phone

 

                    TIGRE DE ANDA III PCP                 [phone]

 

                    VICKIE NESS Attphys             Unavailable

 

                    ALETA GUTHRIE    Attphys             Unavailable

 

                    TOM UGARTE  Attphys             Unavailable

 

                    ISABELLA BARONE     Admphys             Unavailable







Payers





           Payer Name Policy Type Policy Number Effective Date Expiration Date SUZIE Michael [facility]            [id number] 2019 00:00:00        

    [facility]







Problems





           Condition Name Condition Details Condition Category Status     Onset 

Date Resolution

Date            Last Treatment Date Treating Clinician Comments        Source

 

                          Pneumonia of right middle lobe due to infectious organ

ism Pneumonia of right 

middle lobe due to infectious organism Disease   Active    2018 00:00:00  

                             

                                        Juan Garrett

 

        Atypical pneumonia Atypical pneumonia Disease Active  2018 00:00:0

0                          

                                        Juan Garrett

 

       Acute diarrhea Acute diarrhea Problem Active                             

       [facility]

 

       Chest pain Chest pain Problem Active                                    C

[facility]

 

       Chronic abdominal pain Chronic abdominal pain Problem Active             

                       [facility]

 

                Slow transit constipation Constipation by delayed colonic transi

t Problem         Active

                                                                  [facility]

 

       Hypoglycemia Hypoglycemia Problem Active                                 

   [facility]

 

       Chest wall pain        Problem Active                                    

[facility]

 

       Urinary tract infection        Problem Active                            

        [facility]

 

       Gastroesophageal reflux disease        Problem Active                    

                [facility]







Allergies, Adverse Reactions, Alerts





        Allergy Name Allergy Type Status  Severity Reaction(s) Onset Date Inacti

ve Date 

Treating Clinician        Comments                  Source

 

           Morphine   Propensity to adverse reactions to drug Active            

    Hives      2018 

00:00:00                                        Blisters        Juan salazar

 

       No Known Allergies DA     Active U             2015 00:00:00       

               [facility]







Social History





           Social Habit Start Date Stop Date  Quantity   Comments   Source

 

           Sex Assigned At Birth                                             Isidro duenas Gerry

 

           Tobacco use and exposure 2018 00:00:00 2018 00:00:00 Meme krishnamurthy used            

Juan Garrett

 

                Alcohol intake  2018 00:00:00 2018 00:00:00 Current 

non-drinker of 

alcohol (finding)                                   Juan Garrett







                Smoking Status  Start Date      Stop Date       Source

 

                Never smoker                                    Juan salazar







Medications





             Ordered Medication Name Filled Medication Name Start Date   Stop Da

te    Current 

Medication? Ordering Clinician Indication Dosage     Frequency  Signature (SIG) 

Comments                  Components                Source

 

       clonAZEPAM (KlonoPIN) 1 MG tablet        2018 18:50:20        Yes  

                1mg    Q8H    Take 1

mg by mouth every 8 (eight) hours as needed for anxiety.                        

                   Juan Garrett

 

       clonAZEPAM (KlonoPIN) 2 MG tablet        2018 18:50:20        Yes  

                2mg    Q8H    Take 2

mg by mouth every 8 (eight) hours as needed for anxiety.                        

                 Juan Garrett

 

        diclofenac (VOLTAREN) 1 % gel         2018 18:50:20         Yes   

                  1{application} Q6H

                Apply 1 application topically every 6 (six) hours as needed (myranda

n).                                   

Juan Garrett

 

             HYDROcodone-acetaminophen (NORCO)  mg per tablet             

 2018 18:50:20              

Yes                                    1{tbl}       Q8H          Take 1 tablet b

y mouth every 8 (eight) hours as needed for 

moderate pain.                                              Juan Garrett

 

                    promethazine-codeine (PHENERGAN with CODEINE) 6.25-10 mg/5 m

L syrup                     2018

18:50:20            Yes                           5mL       Q6H       Take 5 mL 

by mouth every 6 (six) hours as needed for 

cough.                                                      Juan Garrett

 

       zolpidem (AMBIEN) 10 mg tablet        2018 18:50:20        Yes     

             10mg   QD     Take 10 

mg by mouth nightly as needed for sleep.                                        

 Juan Garrett

 

                    cholecalciferol, vitamin D3, (VITAMIN D3) 1,000 unit tablet 

                    2018 

18:50:20        Yes                  1000U  QD     Take 1,000 Units by mouth aidan

ly.               Juan Garrett

 

           ascorbic acid, vitamin C, (VITAMIN C) 500 MG tablet            2018-0

3-07 18:50:20            Yes        

                    500mg     QD        Take 500 mg by mouth daily.             

        Juan Garrett

 

       TURMERIC ROOT EXTRACT ORAL        2018 18:50:20        Yes         

         1{tbl}        Take 1 

tablet by mouth daily.                                         Juan Garrett

 

                    Loperamide Hcl (Imodium*) 2 Mg CAP Loperamide Hcl (Imodium*)

 2 Mg CAP 2018

06:17:00        Yes                  2             Every 6 Hours as needed for D

iarrhea               [facility]

 

                    Levofloxacin (Levaquin) 500 Mg TABLET Levofloxacin (Levaquin

) 500 Mg TABLET 

2018 06:16:00        Yes                  500           Daily             

   [facility]

 

                    Metronidazole (Flagyl) 500 Mg TABLET Metronidazole (Flagyl) 

500 Mg TABLET 

2018 06:16:00        Yes                  500           Three Times A Day 

              [facility]

 

                    Tramadol Hcl (Ultram 50MG*) 50 Mg TAB Tramadol Hcl (Ultram 5

0MG*) 50 Mg TAB 

2018 06:16:00        Yes                  50            Every 8 Hours as n

eeded for Pain               [facility]

 

     Clonazepam Clonazepam           Yes            1         Three Times A Day 

          [facility]

 

                          Esomeprazole Magnesium (Nexium) 40 Mg CAPSULE.DR Vides

prazole Magnesium (Nexium)

40 Mg CAPSULE.             Yes               40          Daily             [facility]

 

                          Hydrocodone Bit/Acetaminophen (Norco  Tablet) 1 

Each TABLET Hydrocodone 

Bit/Acetaminophen (Norco  Tablet) 1 Each TABLET                 Yes       

              10              Every 4 

Hours                                                       [facility]

 

     Metoprolol Succinate Metoprolol Succinate           Yes            25      

  As Needed           [facility]

 

                Zolpidem Tartrate (Ambien) 5 Mg TABLET Zolpidem Tartrate (Ambien

) 5 Mg TABLET                 

        Yes                     10              Bedtime                 [facility]

 

                          Calcium Carbonate/Vitamin D3 (Calcium 500+D Tablet Nicolasa

w) 1 Each TAB.CHEW Calcium

Carbonate/Vitamin D3 (Calcium 500+D Tablet Chew) 1 Each TAB.CHEW                

           2017 

00:00:00 No                      2               Daily                   [facility]

 

     Clonazepam Clonazepam      2017 00:00:00 No             .5        Aidan

ly           [facility]

 

     Ferrous Sulfate Ferrous Sulfate      2016 00:00:00 No             325

       Daily           [facility]

 

      Metoprolol Tartrate Metoprolol Tartrate       2016 00:00:00 No      

          50          Daily 

                                                    [facility]

 

                          Hydrocodone Bit/Acetaminophen (Norco 5-325 Tablet) 1 E

ach TABLET Hydrocodone 

Bit/Acetaminophen (Norco 5-325 Tablet) 1 Each TABLET              2015-10-02 00:

00:00 No                        

           1                     Q4-6 Hrs as needed for Pain                    

   [facility]

 

                          Clomiphene Citrate (Serophene) 50 Mg TABLET Clomiphene

 Citrate (Serophene) 50 Mg

TABLET       2014 00:00:00 No                50          At Bedtime       

      [facility]

 

     Clonazepam Clonazepam      2014 00:00:00 No             .5        As 

Needed           [facility]

 

                          Esomeprazole Mag Trihydrate (Nexium) 40 Mg CAPSULE. 

Esomeprazole Mag 

Trihydrate (Nexium) 40 Mg CAPSULE.       2014 00:00:00 No               

 1           Daily             

[facility]

 

      Losartan Potassium Losartan Potassium       2014 00:00:00 No        

        50          Daily       

                                        [facility]

 

                          Metoprolol Succinate (Toprol Xl) 50 Mg TAB.SR.24H Meto

prolol Succinate (Toprol 

Xl) 50 Mg TAB.SR.24H       2014 00:00:00 No                50          Aidan

ly             [facility]

 

                    Zolpidem Tartrate (Ambien) 10 Mg TABLET Zolpidem Tartrate (A

mbien) 10 Mg TABLET 

       2014 00:00:00 No                   10            Bedtime           

    [facility]

 

                Paroxetine Hcl (Paxil) 40 Mg TABLET Paroxetine Hcl (Paxil) 40 Mg

 TABLET                 

2013 00:00:00 No                      1               Daily               

    [facility]

 

                          Quetiapine Fumarate (Seroquel) 50 Mg TABLET Quetiapine

 Fumarate (Seroquel) 50 Mg

TABLET       2013 00:00:00 No                1           Daily            

 [facility]







Vital Signs





             Vital Name   Observation Time Observation Value Comments     Source

 

             Weight       2020 17:57:00 167 [lb_av]               [facility]

 

             BMI (Body Mass Index) 2020 17:57:00 28.7 kg/m2               

 [facility]







Procedures

This patient has no known procedures.



Plan of Care





             Planned Activity Planned Date Details      Comments     Source

 

                    Future Scheduled Test 2020 00:00:00 INFLUENZA VACCINE 

[code = INFLUENZA 

VACCINE]                                            [facility] Scheduled Test 2018 00:00:00 65+ PNEUMOCOCCAL V

ACCINE (1 of 1 - 

PPSV23) [code = 65+ PNEUMOCOCCAL VACCINE (1 of 1 - PPSV23)]                     

      [facility] Scheduled Test 2003 00:00:00 BREAST CANCER SCRE

ENING [code = BREAST

 CANCER SCREENING]                                  [facility] Scheduled Test 2003 00:00:00 COLONOSCOPY SCREEN

ING [code = 

COLONOSCOPY SCREENING]                              [facility] Scheduled Test 2003 00:00:00 SHINGLES VACCINES 

(#1) [code = 

SHINGLES VACCINES (#1)]                             [facility]

 

             Instructions              Chest Pain - Chest Wall              [facility]

 

             Instructions              Urinary Tract Infection - Women          

    [facility]







Encounters





             Start Date/Time End Date/Time Encounter Type Admission Type Attendi

[facility]   Care Department Encounter ID    Source

 

             2020 17:37:00 2020 00:19:00 Departed Emergency Room 1  

          CARY NESS         [facility] [id number]    

I [facility]

 

             2019 13:35:00 2019 13:35:00 Registered Emergency Room 1

            BERTRAM GUTHRIE         [facility]          [id number]    [facility]

 

             2019 12:57:00 2019 16:54:00 Departed Emergency Room 1  

          BERTRAM GUTHRIE

                [facility]          [id number]    [facility]

 

             2018 03:28:00 2018 06:00:00 Departed Emergency Room 1  

          CARY NESS         [facility]          [id number]    [facility]

 

          2018 12:36:00 2018 19:05:00 Departed Emergency Room       

              [facility]                    [id number]              [facility]

 

             2018 21:23:00 2018 14:01:00 Discharged Inpatient (obs) 

ER           CHRISTIAN UGARTE           [facility]          [id number]    [facility]







Results





           Test Description Test Time  Test Comments Results    Result Comments 

Source

 

                LUNG PERFUSION  2020 23:28:00                             

                               

                                          [place] [address]     
Patient Name: JOAN RUIZ                                MR #: [id number]        
         : 1955                                   Age/Sex: 65/F  Acct 
#: [id number]                              Req #: [id number]  Adm Physician:  
                                                   Ordered by: CARY NESS MD                         Report #: [id number]        Location: ER    
                                 Room/Bed:                   
________________________________________________________________________________

___________________    Procedure:  NM/LUNG PERFUSION  Exam Date: 
20                            Exam Time: 2300                             
                REPORT STATUS: Signed    Perfusion Lung Scan      NOTE: Lung 
ventilation studies with xenon are not being performed per the   recommendation 
of the Society of Nuclear Medicine and Molecular Imaging.  It is   not possible 
to be certain that the ventilation system is adequately   disinfected.  Vent
ilation studies with Tc-99m DTPA particles is contraindicated   because the 
delivery by nebulization generates too many water droplets from the   patient's 
airway.        Clinical Information: 65 F with left-sided chest pain      
Comparison: Chest radiograph 2020      Discussion:  Ventilation images were 
not obtained.  See note above.      Perfusion images of the lungs were obtained 
in multiple projections following   intravenous administration of approximately 
6 mCi of Tc-99m MAA. Distribution   of tracer is irregular throughout the lungs,
 There are no segmental perfusion   defects of any size.        The 
cardiomediastinal silhouette is unremarkable.      Impression:       1.  Scan 
findings represent a VERY LOW probability for acute pulmonary embolic   disease 
based on the perfusion only PIOPED criteria.  Ventilation imaging would   not 
change the assigned probability.   2.  Scan findings are compatible with diffuse
parenchymal and/or obstructive   lung disease.       Signed by: Dr. Mer Zapata M.D. on 2020 11:33 PM        Dictated By: MER ZAPATA MD  Electronically 
Signed By: MER ZAPATA MD on 20  Transcribed By: KACEY on 20       COPY TO:   CARY NESS MD                                   

  

 

                CHEST SINGLE (PORTABLE) 2020 19:15:00                     

                              

                                                   [place] [address]      Patient Name: JOAN RUIZ                                MR #: 
[id number]                  : 1955                                   
Age/Sex: 65/F  Acct #: [id number]                              Req #: [id number]
[id number]  Adm Physician:                                                      
Ordered by: JUAN LUIS GARCIA MD                         Report #: [id number]       
Location: ER                                      Room/Bed:                   
________________________________________________________________________________

___________________    Procedure: [id number] DX/CHEST SINGLE (PORTABLE)  Exam 
Date: 20                            Exam Time: 1830                       
                      REPORT STATUS: Signed    EXAMINATION:  CHEST SINGLE 
(PORTABLE)         COMPARISON: Chest x-ray 3/22/2019      INDICATION:      POSS 
ASPIRATION    [id number]    1830        DISCUSSION:   Frontal view of the chest 
obtained at 1757 hours.      HEART AND MEDIASTINUM:  The heart is normal in size
of cord is tortuous   calcifications of the arch        LINES:  None.      
LUNGS:  The lungs are well inflated and clear. No pneumonia or pulmonary edema. 
    PLEURA:  No pleural effusion or pneumothorax.      BONES AND SOFT TISSUES:  
No focal osseous lesion. The soft tissues are normal.         IMPRESSION:    No 
acute cardiopulmonary disease.      Signed by: Dr. Mikel Black MD on 
2020 7:24 PM        Dictated By: MIKEL BLACK MD  Electronically Signed
By: MIKEL BLACK MD on 20  Transcribed By: KACEY on 20       COPY TO:   JUAN LUIS GARCIA MD                                     

 

                    Blood leukocytes automated count (number/volume) 2020 

19:00:00   

 

                                        Test Item

 

             White Blood Count (test code = 6690-2) 5.34         4.8-10.8       

            





[facility] 
(number/volume)2020 19:00:00* 



             Test Item    Value        Reference Range Interpretation Comments

 

             Red Blood Count (test code = 789-8) 3.85         3.6-5.1           

         





[facility] 
(moles/volume)2020 19:00:00* 



             Test Item    Value        Reference Range Interpretation Comments

 

             Hemoglobin (test code = 57037-5) 10.1         12.0-16.0            

      





[facility] hematocrit (volume 
fraction)2020 19:00:00* 



             Test Item    Value        Reference Range Interpretation Comments

 

             Hematocrit (test code = 4544-3) 33.0         34.2-44.1             

     





[facility] eryth[facility] mean corpuscular 
[id number] 19:00:00* 



             Test Item    Value        Reference Range Interpretation Comments

 

             Mean Corpuscular Volume (test code = 787-2) 85.7         81-99     

                 





[facility] mean corpuscular 
hemoglobin (mass per erythrocyte)2020 19:00:00* 



             Test Item    Value        Reference Range Interpretation Comments

 

             Mean Corpuscular Hemoglobin (test code = 785-6) 26.2         28-32 

                     





[facility] eryth[facility] mean corpuscular 
hemoglobin concentration measurement (mass/volume)2020 19:00:00* 



             Test Item    Value        Reference Range Interpretation Comments

 

             Mean Corpuscular Hemoglobin Concent (test code = 786-4) 30.6       

  31-35                      





[facility]isco[facility] JsiAc-Wfs2235-06-06 19:00:00* 



             Test Item    Value        Reference Range Interpretation Comments

 

             Red Cell Distribution Width (test code = 53514-0) 14.8         11.7

-14.4                  





[facility] platelet count 
(count/volume)2020 19:00:00* 



             Test Item    Value        Reference Range Interpretation Comments

 

             Platelet Count (test code = 777-3) 258          140-360            

        





[facility] segmented neutrophil 
count as percentage of total ouosfbfips2530-48-38 19:00:00* 



             Test Item    Value        Reference Range Interpretation Comments

 

             Neutrophils (%) (Auto) (test code = 81002-9) 73.0         38.7-80.0

                  





[facility] lymphocyte count as 
percentage ot total lvofayvrjb5024-05-42 19:00:00* 



             Test Item    Value        Reference Range Interpretation Comments

 

             Lymphocytes (%) (Auto) (test code = 736-9) 18.0         18.0-39.1  

                





[facility] monocyte count as 
percentage of total [id number] 19:00:00* 



             Test Item    Value        Reference Range Interpretation Comments

 

             Monocytes (%) (Auto) (test code = 5905-5) 7.1          4.4-11.3    

               





[facility] eosinophil count as 
percentage of total fotwcuhpth5420-83-11 19:00:00* 



             Test Item    Value        Reference Range Interpretation Comments

 

             Eosinophils (%) (Auto) (test code = 713-8) 0.7          0.0-6.0    

                





[facility] blood basophil count as 
percentage of total shzyengaam0311-21-00 19:00:00* 



             Test Item    Value        Reference Range Interpretation Comments

 

             Basophils (%) (Auto) (test code = 706-2) 0.6          0.0-1.0      

              





[facility]iscoFluor[facility] procedure less than one hour
icxkvmwt7934-69-96 19:00:00* 



             Test Item    Value        Reference Range Interpretation Comments

 

             IM GRANULOCYTES % (test code = IM GRANULOCYTES %) 0.6          0.0-

1.0                    





[facility] neutrophil count
2020 19:00:00* 



             Test Item    Value        Reference Range Interpretation Comments

 

             Neutrophils # (Auto) (test code = 751-8) 3.9          2.1-6.9      

              





[facility] lymphocytes count (number/volume)
2020 19:00:00* 



             Test Item    Value        Reference Range Interpretation Comments

 

             Lymphocytes # (Auto) (test code = 94102-9) 1.0          1.0-3.2    

                





[facility] 
(number/volume)2020 19:00:00* 



             Test Item    Value        Reference Range Interpretation Comments

 

             Monocytes # (Auto) (test code = 742-7) 0.4          0.2-0.8        

            





[facility]Autom[facility] eosinophil count
2020 19:00:00* 



             Test Item    Value        Reference Range Interpretation Comments

 

             Eosinophils # (Auto) (test code = 711-2) 0.0          0.0-0.4      

              





[facility] basophil count 
(count/volume)2020 19:00:00* 



             Test Item    Value        Reference Range Interpretation Comments

 

             Basophils # (Auto) (test code = 704-7) 0.0          0.0-0.1        

            





[facility]iscoFluor[facility] procedure less than one hour
[id number] 19:00:00* 



             Test Item    Value        Reference Range Interpretation Comments

 

                                        Absolute Immature Granulocyte (auto (andrade

t code = Absolute Immature Granulocyte 

(auto)          0.03            0-0.1                            





[facility]iscoProthrombin time (PT) in platelet poor 
plasma by coagulation [id number] 19:00:00* 



             Test Item    Value        Reference Range Interpretation Comments

 

             Prothrombin Time (test code = 5902-2) 11.7         11.9-14.5       

           





[facility]iscoINR in Platelet poor plasma by 
Coagulation [id number] 19:00:00* 



             Test Item    Value        Reference Range Interpretation Comments

 

             Prothromb Time International Ratio (test code = 6301-6) 0.81       

                             





Oral Anticoagulant Therapy INR Values:1. Low Intensity Therapy        1.5 - 2.02
. Moderate Intensity Therapy   2.0 - 3.03. High Intensity Therapy(1)    2.5 - 3.
54. High Intensity Therapy(2)    3.0 - 4.05. Panic Value INR              > 5.0
[facility]iscoActivated partial thromboplastin time 
(aPTT) in platelet poor plasma by coagulation vxbmq7283-17-06 19:00:00* 



             Test Item    Value        Reference Range Interpretation Comments

 

             Activated Partial Thromboplast Time (test code = 84868-2) 27.6     

    23.8-35.5                  





[facility]iscoFibrin D-dimer DDU measurement in 
platelet poor plasma (mass/volume)2020 19:00:00* 



             Test Item    Value        Reference Range Interpretation Comments

 

             D-Dimer Quantitative (PE/DVT) (test code = 21002-3) 546.00       0-

400                      





As with all in vitro diagnostic tests, the test results should be interpreted by
the physician in conjunction with clinical findings and other test results.Test 
results are reported in NEW D-dimer units(ug/mLFEU).[facility] or plasma sodium measurement (moles/volume)2020 
19:00:00* 



             Test Item    Value        Reference Range Interpretation Comments

 

             Sodium Level (test code = 2951-2) 137          136-145             

       





[facility]ieerum or plasma potassium measurement 
(moles/volume)2020 19:00:00* 



             Test Item    Value        Reference Range Interpretation Comments

 

             Potassium Level (test code = 2823-3) 4.1          3.5-5.1          

          





[facility]ieerum or plasma chloride measurement 
(moles/volume)2020 19:00:00* 



             Test Item    Value        Reference Range Interpretation Comments

 

             Chloride Level (test code = 2075-0) 105                      

         





[facility]ieerum or plasma carbon dioxide, total 
measurement (moles/volume)2020 19:00:00* 



             Test Item    Value        Reference Range Interpretation Comments

 

             Carbon Dioxide Level (test code = 2028-9) 25           22-29       

               





[facility]ieerum or plasma anion [id number] 
19:00:00* 



             Test Item    Value        Reference Range Interpretation Comments

 

             Anion Gap (test code = 33037-3) 11.1         8-16                  

     





[facility]ieerum or plasma urea nitrogen measurement
(mass/volume)2020 19:00:00* 



             Test Item    Value        Reference Range Interpretation Comments

 

             Blood Urea Nitrogen (test code = 3094-0) 14           7-26         

              





[facility]ieerum or plasma creatinine measurement 
(mass/volume)2020 19:00:00* 



             Test Item    Value        Reference Range Interpretation Comments

 

             Creatinine (test code = 2160-0) 0.87         0.57-1.11             

     





[facility]ieerum or plasma urea nitrogen/creatinine 
mass fmorp3193-51-07 19:00:00* 



             Test Item    Value        Reference Range Interpretation Comments

 

             BUN/Creatinine Ratio (test code = 3097-3) 16           6-25        

               





[facility]iscoEstimated glomerular filtration rate 
(GFR) qwztozqoduytq8415-71-10 19:00:00* 



             Test Item    Value        Reference Range Interpretation Comments

 

             Estimat Glomerular Filtration Rate (test code = 554338822) > 60    

     >60                        





Ranges were taken from the National Kidney Disease Education Program and the [facility] Foundation literature.Reference ranges:60 or greater: Ajflks95-39 (
for 3 consecutive months): Chronic kidney disease 15 or less: Kidney failureTexas Health FriscoGlucose jpdhelwtccc0772-95-04 19:00:00* 



             Test Item    Value        Reference Range Interpretation Comments

 

             Glucose Level (test code = OPM1434) 89                       

         





[facility]ieerum or plasma calcium measurement 
(mass/volume)2020 19:00:00* 



             Test Item    Value        Reference Range Interpretation Comments

 

             Calcium Level (test code = 47089-3) 8.9          8.4-10.2          

         





[facility]ieerum or plasma total bilirubin 
measurement (mass/volume)2020 19:00:00* 



             Test Item    Value        Reference Range Interpretation Comments

 

             Total Bilirubin (test code = 1975-2) 0.3          0.2-1.2          

          





[facility] than one hour
pwmaadgm0563-61-50 19:00:00* 



             Test Item    Value        Reference Range Interpretation Comments

 

                                        Aspartate Amino Transf (AST/SGOT) (test 

code = Aspartate Amino Transf 

(AST/SGOT))     14              5-34                             





[facility]ieerum or plasma alanine aminotransferase 
measurement (enzymatic activity/volume)2020 19:00:00* 



             Test Item    Value        Reference Range Interpretation Comments

 

             Alanine Aminotransferase (ALT/SGPT) (test code = 1742-6) 8         

   0-55                       





[facility]ieerum or plasma protein measurement 
(mass/volume)2020 19:00:00* 



             Test Item    Value        Reference Range Interpretation Comments

 

             Total Protein (test code = 2885-2) 6.9          6.5-8.1            

        





[facility]ieerum or plasma albumin measurement 
(mass/volume)2020 19:00:00* 



             Test Item    Value        Reference Range Interpretation Comments

 

             Albumin (test code = 1751-7) 3.2          3.5-5.0                  

  





[facility]iscoPlasma globulin measurement (mass/volume)
2020 19:00:00* 



             Test Item    Value        Reference Range Interpretation Comments

 

             Globulin (test code = 78473-3) 3.7          2.3-3.5                

    





[facility]ieerum or plasma albumin/globulin mass 
jvbpv3516-12-34 19:00:00* 



             Test Item    Value        Reference Range Interpretation Comments

 

             Albumin/Globulin Ratio (test code = 1759-0) 0.9          0.8-2.0   

                 





[facility]ieerum or plasma alkaline phosphatase 
measurement (enzymatic activity/volume)2020 19:00:00* 



             Test Item    Value        Reference Range Interpretation Comments

 

             Alkaline Phosphatase (test code = 6768-6) 96                 

               





[facility]iscoBNP Bld-mZmt1129-97-50 19:00:00* 



             Test Item    Value        Reference Range Interpretation Comments

 

             B-Type Natriuretic Peptide (test code = 14390-2) 14.1         0-100

                      





[facility]ieerum or plasma creatine kinase 
measurement (enzymatic activity/volume)2020 19:00:00* 



             Test Item    Value        Reference Range Interpretation Comments

 

             Creatine Kinase (test code = 2157-6) 33                      

          





[facility]ieerum or plasma creatine kinase MB 
measurement (mass/volume)2020 19:00:00* 



             Test Item    Value        Reference Range Interpretation Comments

 

             Creatine Kinase MB (test code = 25371-2) 0.70         0-5.0        

              





[facility]iscoTroponin I measurement by highly 
sensitive enzyme xuxzwrpgdlw8326-31-36 19:00:00* 



             Test Item    Value        Reference Range Interpretation Comments

 

             Troponin I (test code = 78665-9) < 0.001      0-0.300              

      





[facility] FriscoUrine color jkfoyxdclyaec7861-61-87 
18:14:00* 



             Test Item    Value        Reference Range Interpretation Comments

 

             Urine Color (test code = 5778-6) YELLOW       YELLOW               

      





[facility] FriscoUrine wfdvexq9462-25-66 18:14:00* 



             Test Item    Value        Reference Range Interpretation Comments

 

             Urine Clarity (test code = 37555-8) HAZY         CLEAR             

         





[facility]iepecific gravity of Urine by Test strip
2020 18:14:00* 



             Test Item    Value        Reference Range Interpretation Comments

 

             Urine Specific Gravity (test code = 5811-5) 1.025        1.010-1.02

5                





[facility]iscoUrine pH measurement by automated test 
mblqw7292-42-01 18:14:00* 



             Test Item    Value        Reference Range Interpretation Comments

 

             Urine pH (test code = 68050-2) 8.5          5-7                    

    





[facility]iscoUrine leukocyte esterase detection by 
oatfjtwv9128-60-81 18:14:00* 



             Test Item    Value        Reference Range Interpretation Comments

 

             Urine Leukocyte Esterase (test code = 5799-2) LARGE        NEGATIVE

                   





[facility]iscoUrine nitrite xqakejhtj2288-07-14 
18:14:00* 



             Test Item    Value        Reference Range Interpretation Comments

 

             Urine Nitrite (test code = 49612-2) POSITIVE     NEGATIVE          

         





[facility]iscoUrine protein measurement by test strip 
(mass/volume)2020 18:14:00* 



             Test Item    Value        Reference Range Interpretation Comments

 

             Urine Protein (test code = 5804-0) NEGATIVE     NEGATIVE           

        





[facility]iscoUrine glucose xkoinamfo6842-94-35 
18:14:00* 



             Test Item    Value        Reference Range Interpretation Comments

 

             Urine Glucose (UA) (test code = 2349-9) NEGATIVE     NEGATIVE      

             





[facility]iscoUrine ketones detection by automated test
xucem6310-50-37 18:14:00* 



             Test Item    Value        Reference Range Interpretation Comments

 

             Urine Ketones (test code = 11165-8) NEGATIVE     NEGATIVE          

         





[facility]iscoUrine urobilinogen measurement by test 
strip (mass/volume)2020 18:14:00* 



             Test Item    Value        Reference Range Interpretation Comments

 

             Urine Urobilinogen (test code = 27141-7) 0.2          0.2-1        

              





[facility]iscoUrine total bilirubin measurement 
(mass/volume)2020 18:14:00* 



             Test Item    Value        Reference Range Interpretation Comments

 

             Urine Bilirubin (test code = 1978-6) NEGATIVE     NEGATIVE         

          





[facility]iscoUrine erythrocytes blgsglwow9135-72-02 
18:14:00* 



             Test Item    Value        Reference Range Interpretation Comments

 

             Urine Blood (test code = 45696-9) TRACE        NEGATIVE            

       





[facility]iscoAutomated urine sediment leukocyte count 
by microscopy (number/high power field)2020 18:14:00* 



             Test Item    Value        Reference Range Interpretation Comments

 

             Urine WBC (test code = 5821-4) 21-50        0-5                    

    





[facility] FriscoErythrocytes detection in urine sediment 
by light gndhaukpxx4884-25-53 18:14:00* 



             Test Item    Value        Reference Range Interpretation Comments

 

             Urine RBC (test code = 41940-5) 6-10         0-5                   

     





[facility]iscoBacteria detection in urine sediment by 
light hgtrgkbmsq3804-42-91 18:14:00* 



             Test Item    Value        Reference Range Interpretation Comments

 

             Urine Bacteria (test code = 23740-5) MANY         NONE             

          





[facility] FriscoEpithelial cells detection in urine 
sediment by light hvvdeedgcf3503-05-66 18:14:00* 



             Test Item    Value        Reference Range Interpretation Comments

 

             Urine Epithelial Cells (test code = 01852-2) FEW          NONE     

                  





[facility]iscoMucus detection in urine sediment by 
light ibbzxqivuj5674-38-91 18:14:00* 



             Test Item    Value        Reference Range Interpretation Comments

 

             Urine Mucus (test code = 8247-9) MODERATE     RARE                 

      





[facility]achieCR MAMM BILATERAL IGLESIA CAD DIGITAL
2020 08:42:11 - SCR MAMM BILATERAL IGLESIA CAD DIGITALBILATERAL DIGITAL 
SCREENING MAMMOGRAM 3D/2D WITH CAD: 2020CLINICAL: Asymptomatic.  Digital 
breast tomosynthesis was performed in addition to routine CC and MLO views.  
Current mammographic images were evaluated by either a [employer] M-Vu or a [employer] 
ImageChecker CAD (computer aided detection system).  Comparison is made to exams
dated  2018 mammogram, 2017 mammogram, and 2016 mammogram - [facility]-.  There are scattered fibroglandular tissues in both 
breasts.  There are benign vascular calcifications and calcifications in both 
breasts.  No suspicious mass, architectural distortion, malignant type 
calcification, or lymph node abnormality detected.  Breast architecture is 
stable compared to prior exams.IMPRESSION: BENIGNThere is no mammographic 
evidence of malignancy. Resume annual screening mammography in one year.  
Eugene Durham M.D.          ss/penrad:2020 08:42:11  Imaging 
Technologist: Nichole Baird , [facility]-FWletter sent: BIRADS 1-
2 Normal  Mammogram BI-RADS: 2 Benign- CT ABD PELVIS W/YTTO2150-81-71 11:54:00  
Name: JOAN RUIZ                        [facility]     : 
1955 Age/S: 64  / F         [address] [facility]           Unit #: [id number]     Loc:               [place] [address]                Phys: Kelsey Hannon MD                                                   Acct: [id number]  Di
s Date:               Status: REG ER                                  PHONE #: [phone]
[phone]     Exam Date: 2019  1057                     FAX #: [phone]
[phone]      Reason: epigastric abd pain, nausea/vomiting, h/o ileus     EXAMS:     
                                         CPT CODE:      [id number] CT ABD PELVIS 
W/CONT                       14798                    REASON FOR EXAM: epigastr
ic abd pain, nausea/vomiting, h/o ileus/SBO               EXAM ORDER DATE: 12/3/
2019 9:41 AM               Ordering MJuan ADJuan A: Arabella Hannon MD               PROCED
URE:  - CT ABD PELVIS W/CONT  contrast-enhanced axial CT images       were acqui
red through the abdomen/pelvis at 5 mm intervals.  Sagittal       and coronal re
formatted images were generated.  Automated exposure       control was utilized 
for this reduction.               Phases of contrast: Venous               TRUDI
RISON: CT of the abdomen and pelvis 2019               FINDINGS:       
        Visualized thorax: Lung bases are clear. Atherosclerotic disease is     
 seen throughout the coronary arteries               Hepatobiliary system: Prior
cholecystectomy. Hepatic parenchyma is       within normal limits               
Pancreas: Mildly atrophic               Spleen: Normal               Adrenal 
glands: Normal               Genitourinary system: Prior hysterectomy. Otherwise
normal               Gastrointestinal tract and appendix: Postsurgical changes 
of gastric       bypass. There are also anastomotic sutures in the pelvis sugges
ting       subsegmental colonic resection with a colorectal anastomosis. No     
 abnormal bowel distention and no significant colonic stool burden.       Appen
shaniqua is within normal limits.               Abdominal vascular structures: Athero
sclerotic plaques are scattered       throughout the abdominal aorta            
  Peritoneum and retroperitoneum: No free fluid or free air.  No omental       
or mesenteric masses.  No abnormal lymph nodes.               Musculoskeletal st
ructures and abdominal wall: Mild degenerative       changes are present in the 
spine.     PAGE  1                       Signed Report                    (CATALINA
NUED)   Name: JOAN RUIZ                        [facility]  
  : 1955 Age/S: 64  / F         [address] [facility]           Unit #:
[id number]     Loc:               [place] [address]                Phys: Arabella Hannon MD                                                   Acct: [id number]
[id number]  Dis Date:               Status: REG ER                                  PHON
E #: [phone]     Exam Date: 2019  1055                     FAX #: [phone]
[phone]      Reason: epigastric abd pain, nausea/vomiting, h/o ileus     EXAMS
:                                               CPT CODE:      [id number] CT ABD 
PELVIS W/CONT                       10940               <Continued>             
    IMPRESSION:         No acute intra-abdominal process.         Postsurgical 
changes of gastric bypass as well as findings suggesting         segmental 
colonic resection with colorectal anastomosis. No abnormal         distention of
the bowel to suggest ileus or obstruction.                   Location: [facility]      
   ** Electronically Signed by Alton Tobias MD on 2019 at 1154 **          
           Reported and signed by: Alton Tobias MD                              
CC: Arabella Hannon MD; Jaydon De Anda III, MD                               
                                                           Technologist:Nanda Pina                     CTDI:        DLP:        Trnscb Date/Time: 2019
(3662) t.SDR.RR31                       Orig Print D/T: S: 2019 (5998)    
 PAGE  2                       Signed Report                               - XR 
CHEST 1 -21-21 10:57:00  Name: JOAN RIUZwood 
[facility]    : 1955 Age/S:64  /F            [address] [facility]           Unit#:[id number]     Loc: [place] [address]  
            Phys: Arabella Hannon MD                                             
    Acct#: [id number] Dis Date:                   PHONE #: [phone]      
Status: REG ER                                   FAX #: [phone]      Exam 
Date: 2019           Reason: epigastric abd pain                          
      EXAMS:                                               CPT CODE:      
[id number] XR CHEST 1 V                               [address]44                      
     REASON FOR EXAM: epigastric abd pain               Exam Order Date: 
12/3/2019 9:41 AM               Ordering M.D.: Arabella Hannon MD               
PROCEDURE:  - XR CHEST 1 V               COMPARISON: Frontal chest x-ray 
2011               FINDINGS:         The lungs are clear.  There is
no pleural effusion or pneumothorax.       Pulmonary vascularity is within 
normal limits.               Cardiomediastinal silhouette is normal in size for 
technique. The       mediastinal contours are within normal limits. Mild 
atherosclerotic       disease is present in the aortic arch.               
Musculoskeletal structures are within normal limits.               The 
visualized upper abdomen is within normal limits.                         
IMPRESSION:         No acute cardiopulmonary process.                   Locatio
n: [facility]          ** Electronically Signed by Alton Tobias MD on 2019 at 1057
**                      Reported and signed by: Alton Tobias MD            CC: Arabella Medina MD; Jaydon De Anda III, MD                                    
                                                      Technologist: Nanda Pina 
                                          Trnscrpt Data: 2019 (5947) tTAVO [id number]                         Orig Print D/T: S: 2019 (7292)            
            PAGE  1                       Signed Report                         
     KQOJSAPY--30-03 10:47:00* 



             Test Item    Value        Reference Range Interpretation Comments

 

             TROPONIN-I (test code = TROPI) <0.015 ng/mL 0.00-0.056   N         

    





COMPREHENSIVE METABOLIC [id number] 10:31:00* 



             Test Item    Value        Reference Range Interpretation Comments

 

             SODIUM (test code = NA) 137 mmol/L   136-145      N             

 

             POTASSIUM (test code = K) 4.2 mmol/L   3.5-5.1      N             

 

             CHLORIDE (test code = CL) 98 mmol/L    101-109      L             

 

             CARBON DIOXIDE (test code = CO2) 27.3 mmol/L  21-32        N       

      

 

             ANION GAP (test code = GAP) 16 mmol/L    10-20        N            

 

 

             GLUCOSE (test code = GLU) 113 mg/dL           H             

 

             BLOOD UREA NITROGEN (test code = BUN) 8 mg/dL      3-21         N  

           

 

             CREATININE (test code = CREAT) 0.69 mg/dL   0.55-1.3     N         

    

 

             BUN/CREATININE RATIO (test code = BUN/CREA) 11.6         10-20     

   N             

 

             TOTAL PROTEIN (test code = PROT) 8.5 g/dL     6.5-8.4      H       

      

 

             ALBUMIN (test code = ALB) 4.1 g/dL     3.4-4.8      N             

 

             GLOBULIN (test code = GLOB) 4.4 G/DL     1-10         N            

 

 

             ALBUMIN/GLOBULIN RATIO (test code = A/G) 0.93 RATIO   0.75-1.50    

N             

 

             CALCIUM (test code = CA) 8.7 mg/dL    8.4-10.2     N             

 

             BILIRUBIN TOTAL (test code = BILT) 0.60 mg/dL   0.0-1.0      N     

        

 

             SGOT/AST (test code = AST) 21 U/L       6-32         N             

 

             SGPT/ALT (test code = ALT) 19 U/L       12-78        N            N

ote: Change in REFERENCE RANGE due to

new reagent method.

 

             ALKALINE PHOSPHATASE TOTAL (test code = ALKP) 87 U/L         

     N             





[id number] 10:31:00* 



             Test Item    Value        Reference Range Interpretation Comments

 

             LIPASE (test code = LIP) 302 U/L      128-270      H             





URINALYSIS [id number] 10:28:00* 



             Test Item    Value        Reference Range Interpretation Comments

 

             UA COLOR (test code = COLU) YELLOW       YELLOW                    

 

 

             UA APPEARANCE (test code = APPU) HAZY         CLEAR        A       

      

 

             UA GLUCOSE DIPSTICK (test code = DGLUU) norm mg/dL   NEGATIVE      

             

 

             UA BILIRUBIN DIPSTICK (test code = BILU) NEGATIVE mg/dL NEGATIVE   

                

 

             UA KETONE DIPSTICK (test code = KETU) neg mg/dL    NEGATIVE        

           

 

             UA SPECIFIC GRAVITY (test code = SGU) 1.015        1.001-1.035     

           

 

             UA BLOOD DIPSTICK (test code = NESS) 10 (Trace) Lj/uL NEGATIVE     

A             

 

             UA PH DIPSTICK (test code = YUMI) 9.0          5.0-8.0      A       

      

 

             UA PROTEIN DIPSTICK (test code = PROU) neg mg/dL    Neg-15         

            

 

             UA UROBILINIOGEN DIPSTICK (test code = URO) norm mg/dL   0.0-0.2   

                 

 

             UA NITRITE DIPSTICK (test code = MARILU) POSITIVE     NEGATIVE       

            

 

                UA LEUKOCYTE ESTERASE DIPSTICK (test code = LEUU) 25 Catarina/uL (Tra

ce) uL NEGATIVE        

A                                        

 

             UA WBC (test code = WBCU) 0-5 per HPF  0-5                        

 

             UA RBC (test code = RBCU) 0-2 per HPF  0-5                        

 

             UA EPITHELIAL CELLS (test code = EPIU) RARE per HPF Few            

            

 

             UA BACTERIA (test code = BACU) MANY per HPF NONE                   

    

 

             UA AMORPHOUS SEDIMENT (test code = AMORU) MANY per LPF NONE        

               





Urine Source? Clean CatchCOMPREHENSIVE METABOLIC [id number] 10:20:00* 



             Test Item    Value        Reference Range Interpretation Comments

 

             SODIUM (test code = NA) 137 mmol/L   136-145      N             

 

             POTASSIUM (test code = K) 4.2 mmol/L   3.5-5.1      N             

 

             CHLORIDE (test code = CL) 98 mmol/L    101-109      L             

 

             CARBON DIOXIDE (test code = CO2) 27.3 mmol/L  21-32        N       

      

 

             ANION GAP (test code = GAP) 16 mmol/L    10-20        N            

 

 

             GLUCOSE (test code = GLU) 113 mg/dL           H             

 

             BLOOD UREA NITROGEN (test code = BUN) 8 mg/dL      3-21         N  

           

 

             CREATININE (test code = CREAT) 0.69 mg/dL   0.55-1.3     N         

    

 

             BUN/CREATININE RATIO (test code = BUN/CREA) 11.6         10-20     

   N             

 

             TOTAL PROTEIN (test code = PROT)  gram/dL     6.4-8.2              

      

 

             ALBUMIN (test code = ALB)  g/dL        3.4-5.0                    

 

             GLOBULIN (test code = GLOB)  g/dL        2.7-4.2                   

 

 

             ALBUMIN/GLOBULIN RATIO (test code = A/G)              0.75-1.50    

              

 

             CALCIUM (test code = CA) 8.7 mg/dL    8.4-10.2     N             

 

             BILIRUBIN TOTAL (test code = BILT)  mg/dL       0.2-1.2            

        

 

             SGOT/AST (test code = AST)  IUnit/L     15-37                      

 

             SGPT/ALT (test code = ALT)  U/L         10-69                      

 

             ALKALINE PHOSPHATASE TOTAL (test code = ALKP)  IUnit/L       

                   





[id number] 10:20:00* 



             Test Item    Value        Reference Range Interpretation Comments

 

             LIPASE (test code = LIP)  Unit/L      144-286                    





URINALYSIS [id number] 09:48:00* 



             Test Item    Value        Reference Range Interpretation Comments

 

             UA COLOR (test code = COLU) YELLOW       YELLOW                    

 

 

             UA APPEARANCE (test code = APPU)              CLEAR                

      

 

             UA GLUCOSE DIPSTICK (test code = DGLUU) norm mg/dL   NEGATIVE      

             

 

             UA BILIRUBIN DIPSTICK (test code = BILU) NEGATIVE mg/dL NEGATIVE   

                

 

             UA KETONE DIPSTICK (test code = KETU) neg mg/dL    NEGATIVE        

           

 

             UA SPECIFIC GRAVITY (test code = SGU) 1.015        1.001-1.035     

           

 

             UA BLOOD DIPSTICK (test code = NESS) 10 (Trace) Lj/uL NEGATIVE     

A             

 

             UA PH DIPSTICK (test code = YUMI) 9.0          5.0-8.0      A       

      

 

             UA PROTEIN DIPSTICK (test code = PROU) neg mg/dL    Neg-15         

            

 

             UA UROBILINIOGEN DIPSTICK (test code = URO) norm mg/dL   0.0-0.2   

                 

 

             UA NITRITE DIPSTICK (test code = MARILU) POSITIVE     NEGATIVE       

            

 

                UA LEUKOCYTE ESTERASE DIPSTICK (test code = LEUU) 25 Catarina/uL (Tra

ce) uL NEGATIVE        

A                                        

 

             UA WBC (test code = WBCU)  per HPF     0-5                        

 

             UA RBC (test code = RBCU)  per HPF     0-5                        

 

             UA EPITHELIAL CELLS (test code = EPIU)  per HPF     Few            

            

 

             UA BACTERIA (test code = BACU)  per HPF     NONE                   

    





Urine Source? Clean CatchCBC W/AUTO [id number] 09:46:00* 



             Test Item    Value        Reference Range Interpretation Comments

 

             WHITE BLOOD CELL (test code = WBC) 4.2 K/mm3    4.5-12.5     L     

        

 

             RED BLOOD CELL (test code = RBC) 4.96 mill/mm3 3.7-5.2      N      

       

 

             HEMOGLOBIN (test code = HGB) 13.0 gram/dL 11.5-15.5    N           

  

 

             HEMATOCRIT (test code = HCT) 41.1 %       36.0-46.0    N           

  

 

             MEAN CELL VOLUME (test code = MCV) 82.9 fL      80-98        N     

        

 

             MEAN CELL HGB (test code = MCH) 26.2 picogram 27.0-33.0    L       

      

 

             MEAN CELL HGB CONCETRATION (test code = MCHC) 31.6 gram/dL 33.0-36.

0    L             

 

             RED CELL DISTRIBUTION WIDTH (test code = RDW) 13.9 %       11.6-16.

2    N             

 

             RED CELL DISTRIBUTION WIDTH SD (test code = RDW-SD) 42.9 fL      37

.0-51.0    N             

 

             PLATELET COUNT (test code = PLT) 270 K/mm3    150-450      N       

      

 

             MEAN PLATELET VOLUME (test code = MPV) 10.0 fL      6.7-11.0     N 

            

 

             NEUTROPHIL % (test code = NT%) 60.2 %       39.0-69.0    N         

    

 

             LYMPHOCYTE % (test code = LY%) 29.8 %       25.0-55.0    N         

    

 

             MONOCYTE % (test code = MO%) 9.1 %        0.0-10.0     N           

  

 

             EOSINOPHIL % (test code = EO%) 0.2 %        0.0-5.0      N         

    

 

             BASOPHIL % (test code = BA%) 0.7 %        0.0-1.0      N           

  

 

             NEUTROPHIL # (test code = NT#) 2.52 K/mm3   1.8-7.7      N         

    

 

             LYMPHOCYTE # (test code = LY#) 1.25 K/mm3   1.0-5.0      N         

    

 

             MONOCYTE # (test code = MO#) 0.38 K/mm3   0-0.8        N           

  

 

             EOSINOPHIL # (test code = EO#) 0.01 K/mm3   0.0-0.5      N         

    

 

             BASOPHIL # (test code = BA#) 0.03 K/mm3   0.0-0.2      N           

  

 

             MANUAL DIFF REQUIRED (test code = MDIFF) NO                        

              





URINALYSIS [id number] 20:01:00* 



             Test Item    Value        Reference Range Interpretation Comments

 

             UA COLOR (test code = COLU) YELLOW       YELLOW                    

 

 

             UA APPEARANCE (test code = APPU) CLEAR        CLEAR                

      

 

             UA GLUCOSE DIPSTICK (test code = DGLUU) norm mg/dL   NEGATIVE      

             

 

             UA BILIRUBIN DIPSTICK (test code = BILU) NEGATIVE mg/dL NEGATIVE   

                

 

             UA KETONE DIPSTICK (test code = KETU) 5 (Trace) mg/dL NEGATIVE     

A             

 

             UA SPECIFIC GRAVITY (test code = SGU) 1.010        1.001-1.035     

           

 

             UA BLOOD DIPSTICK (test code = NESS) neg Lj/uL   NEGATIVE          

         

 

             UA PH DIPSTICK (test code = YUMI) 6.5          5.0-8.0              

      

 

             UA PROTEIN DIPSTICK (test code = PROU) neg mg/dL    Neg-15         

            

 

             UA UROBILINIOGEN DIPSTICK (test code = URO) norm mg/dL   0.0-0.2   

                 

 

             UA NITRITE DIPSTICK (test code = MARILU) NEGATIVE     NEGATIVE       

            

 

             UA LEUKOCYTE ESTERASE DIPSTICK (test code = LEUU) 100 Catarina/uL (1+) u

L NEGATIVE     A            



 

             UA WBC (test code = WBCU) 3-5 per HPF  0-5                        

 

             UA RBC (test code = RBCU) 0-2 per HPF  0-5                        

 

             UA EPITHELIAL CELLS (test code = EPIU) Few (2-5/hpf) per HPF Few   

                     

 

             UA BACTERIA (test code = BACU) FEW per HPF  NONE                   

    





Urine Source? Clean CatchBASIC METABOLIC [id number] 19:45:00* 



             Test Item    Value        Reference Range Interpretation Comments

 

             SODIUM (test code = NA) 132 mmol/L   135-148      L             

 

             POTASSIUM (test code = K) 4.1 mmol/L   3.5-5.1      N             

 

             CHLORIDE (test code = CL) 97 mmol/L    101-109      L             

 

             CARBON DIOXIDE (test code = CO2) 30.4 mmol/L  21-32        N       

      

 

             ANION GAP (test code = GAP) 9 mmol/L     10-20        L            

 

 

             GLUCOSE (test code = GLU) 84 mg/dL            N             

 

             BLOOD UREA NITROGEN (test code = BUN) 17 mg/dL     3-21         N  

           

 

             GLOMERULAR FILTRATION RATE (test code = GFR) > 60 mL/min  >=60     

                 Estimated GFR by

using Modified MDRD formula.Chronic kidney disease is defined as either kidney 
damageor GFR <60 mL/min/1.73 m2 for >3 months.

 

             CREATININE (test code = CREAT) 0.67 mg/dL   0.55-1.3     N         

    

 

             BUN/CREATININE RATIO (test code = BUN/CREA) 25.4         10-20     

   H             

 

             CALCIUM (test code = CA) 9.2 mg/dL    8.4-10.2     N             





HEPATIC FUNCTION [id number] 19:45:00* 



             Test Item    Value        Reference Range Interpretation Comments

 

             TOTAL PROTEIN (test code = PROT) 6.6 g/dL     6.5-8.4      N       

      

 

             ALBUMIN (test code = ALB) 3.3 g/dL     3.4-4.8      L             

 

             GLOBULIN (test code = GLOB) 3.3 G/DL     1-10         N            

 

 

             ALBUMIN/GLOBULIN RATIO (test code = A/G) 1.0 RATIO    0.75-1.50    

N             

 

             BILIRUBIN TOTAL (test code = BILT) 0.40 mg/dL   0.0-1.0      N     

        

 

             BILIRUBIN DIRECT (test code = BILD) 0.10 mg/dL   0.0-0.30     N    

         

 

             SGOT/AST (test code = AST) 27 U/L       6-32         N             

 

             SGPT/ALT (test code = ALT) 24 U/L       12-78        N            N

ote: Change in REFERENCE RANGE due to

new reagent method.

 

             ALKALINE PHOSPHATASE TOTAL (test code = ALKP) 90 U/L         

     N             





[id number] 19:45:00* 



             Test Item    Value        Reference Range Interpretation Comments

 

             LIPASE (test code = LIP) 271 U/L      128-270      H             





BASIC METABOLIC [id number] 19:35:00* 



             Test Item    Value        Reference Range Interpretation Comments

 

             SODIUM (test code = NA) 132 mmol/L   135-148      L             

 

             POTASSIUM (test code = K) 4.1 mmol/L   3.5-5.1      N             

 

             CHLORIDE (test code = CL) 97 mmol/L    101-109      L             

 

             CARBON DIOXIDE (test code = CO2) 30.4 mmol/L  21-32        N       

      

 

             ANION GAP (test code = GAP) 9 mmol/L     10-20        L            

 

 

             GLUCOSE (test code = GLU) 84 mg/dL            N             

 

             BLOOD UREA NITROGEN (test code = BUN) 17 mg/dL     3-21         N  

           

 

             GLOMERULAR FILTRATION RATE (test code = GFR) > 60 mL/min  >=60     

                 Estimated GFR by

using Modified MDRD formula.Chronic kidney disease is defined as either kidney 
damageor GFR <60 mL/min/1.73 m2 for >3 months.

 

             CREATININE (test code = CREAT) 0.67 mg/dL   0.55-1.3     N         

    

 

             BUN/CREATININE RATIO (test code = BUN/CREA) 25.4         10-20     

   H             

 

             CALCIUM (test code = CA) 9.2 mg/dL    8.4-10.2     N             





HEPATIC FUNCTION [id number] 19:35:00* 



             Test Item    Value        Reference Range Interpretation Comments

 

             TOTAL PROTEIN (test code = PROT)  gram/dL     6.4-8.2              

      

 

             ALBUMIN (test code = ALB)  g/dL        3.4-5.0                    

 

             GLOBULIN (test code = GLOB)  g/dL        2.7-4.2                   

 

 

             ALBUMIN/GLOBULIN RATIO (test code = A/G)              0.75-1.50    

              

 

             BILIRUBIN TOTAL (test code = BILT)  mg/dL       0.2-1.2            

        

 

             BILIRUBIN DIRECT (test code = BILD)  mg/dL       0.0-0.20          

         

 

             SGOT/AST (test code = AST)  IUnit/L     15-37                      

 

             SGPT/ALT (test code = ALT)  U/L         10-69                      

 

             ALKALINE PHOSPHATASE TOTAL (test code = ALKP)  IUnit/L       

                   





[id number] 19:35:00* 



             Test Item    Value        Reference Range Interpretation Comments

 

             LIPASE (test code = LIP)  Unit/L      144-286                    





CBC W/O [id number] 19:24:00* 



             Test Item    Value        Reference Range Interpretation Comments

 

             WHITE BLOOD CELL (test code = WBC) 5.2 K/mm3    4.5-12.5     N     

        

 

             RED BLOOD CELL (test code = RBC) 4.54 mill/mm3 3.7-5.2      N      

       

 

             HEMOGLOBIN (test code = HGB) 12.1 gram/dL 11.5-15.5    N           

  

 

             HEMATOCRIT (test code = HCT) 37.9 %       36.0-46.0    N           

  

 

             MEAN CELL VOLUME (test code = MCV) 83.5 fL      80-98        N     

        

 

             MEAN CELL HGB (test code = MCH) 26.7 picogram 27.0-33.0    L       

      

 

             MEAN CELL HGB CONCETRATION (test code = MCHC) 31.9 gram/dL 33.0-36.

0    L             

 

             RED CELL DISTRIBUTION WIDTH (test code = RDW) 13.7 %       11.6-16.

2    N             

 

             RED CELL DISTRIBUTION WIDTH SD (test code = RDW-SD) 42.3 fL      37

.0-51.0    N             

 

             PLATELET COUNT (test code = PLT) 254 K/mm3    150-450      N       

      

 

             MEAN PLATELET VOLUME (test code = MPV) 9.6 fL       6.7-11.0     N 

            





- CT ABD PELVIS [id number] 11:21:00  Name: JOAN RUIZ                     
  [facility]     : 1955 Age/S: 64  / F         [address]
[facility]           Unit #: [id number]     Loc:               [place] [address]                Phys: Jaxon Leyva MD                             
                 Acct: [id number]  Dis Date:               Status: REG ER     
                            PHONE #: [phone]     Exam Date: 2019  
1100                     FAX #: [phone]      Reason: upper abdominal pain, 
hx obstructions               EXAMS:                                            
  CPT CODE:      [id number] CT ABD PELVIS W/CONT                       75846     
              TECHNIQUE:        - CT ABD PELVIS W/CONT .                 This 
exam was performed using one or more of the following dose       reduction 
techniques: Automated exposure control, adjustment of the mA       and/ or kV 
according to patient size or use of iterative       reconstruction technique.   
           COMPARISON: CT abdomen and pelvis 2018               HISTORY:   
   64 years Female         upper abdominal pain, hx obstructions                
                       FINDINGS:       CT ABDOMEN:               Included lung 
bases and visualized lower mediastinum: Lower lung field       atelectasis.. No 
significant abnormality in the visualized lower       mediastinum.              
Liver: Normal in size and density.  No focal lesions.               Gallbladder 
and biliary ducts: Cholecystectomy. No intra-or       extrahepatic biliary 
ductal dilatation.               Spleen: Normal in size and density.  No focal 
lesions.               Adrenals: No adrenal nodules or enlargement.             
 Pancreas: No focal lesion, calcifications, pancreatic duct dilatation,       or
peripancreatic fluid collections.               Right kidney: Normal in position
and size.   No stones.   No       hydronephrosis.  No focal lesions.            
  Left kidney:  Normal in position and size.  No stones.  No       
hydronephrosis.  Sub-5 mm hypodensities left kidney too small to       
characterize.               GI structures: No small or large bowel dilatation.  
No small or large       bowel wall thickening.    Prior surgical changes 
involving multiple     PAGE  1                       Signed Report              
     (CONTINUED)   Name: JOAN RUIZ                        [facility]     : 1955 Age/S: 64  / F         [address] [facility]       
   Unit #: [id number]     Loc:               [place] [address]                
Phys: Jaxon Leyva MD                                               Acct: 
[id number]  Dis Date:               Status: REG ER                            
     PHONE #: [phone]     Exam Date: 2019  1100                     
FAX #: [phone]      Reason: upper abdominal pain, hx obstructions          
    EXAMS:                                               CPT CODE:      
[id number] CT ABD PELVIS W/CONT                       [address]698               <
Continued>        loops of bowel ,no evidence of obstruction.               
Retroperitoneum and mesentery: No significant lymphadenopathy.   No       free 
fluid.   No free air.  No mesenteric abnormalities.   No       retroperitoneal 
abnormality.                  Abdominal wall: No abdominal wall hernia.         
     Vascular structures: Age appropriate.  No aneurysm.                Osseous 
structures: Age appropriate.               Soft tissues and musculoskeletal 
structures: No significant findings.                        CT PELVIS:       The
urinary bladder is partially distended and appears unremarkable       from this 
exam.               No abnormalities in pelvic viscera.               No 
significant lymphadenopathy.      No free fluid.  No inflammatory       changes.
 No inguinal abnormalities.                          IMPRESSION:         Prior 
surgical changes involving multiple loops of bowel ,no evidence         of 
obstruction.         Cholecystectomy.         Sub-5 mm hypodensities left kidney
too small to characterize.                               ** Electronically 
Signed by Adam Kelly M.D. on 2019 at 1121 **                      Reported
and signed by: Adam Kelly M.D.      CC: Jaxon Leyva MD; Dom Chávez    
                                                                                
              Technologist:GIRISH GERARDO RT(R),CT       CTDI:        DLP:    
   Trnscb Date/Time: 2019 (1121) t.XENIA                        Orig 
Print D/T: S: 2019 (9098)      PAGE  2                       Signed Report
                              BASIC METABOLIC [id number] 10:16:00* 



             Test Item    Value        Reference Range Interpretation Comments

 

             SODIUM (test code = NA) 134 mmol/L   136-145      L             

 

             POTASSIUM (test code = K) 3.9 mmol/L   3.5-5.1      N             

 

             CHLORIDE (test code = CL) 98 mmol/L    101-109      L             

 

             CARBON DIOXIDE (test code = CO2) 25.2 mmol/L  21-32        N       

      

 

             ANION GAP (test code = GAP) 15 mmol/L    10-20        N            

 

 

             GLUCOSE (test code = GLU) 133 mg/dL           H             

 

             BLOOD UREA NITROGEN (test code = BUN) 15 mg/dL     3-21         N  

           

 

             GLOMERULAR FILTRATION RATE (test code = GFR) > 60 mL/min  >=60     

                 Estimated GFR by

using Modified MDRD formula.Chronic kidney disease is defined as either kidney 
damageor GFR <60 mL/min/1.73 m2 for >3 months.

 

             CREATININE (test code = CREAT) 0.74 mg/dL   0.55-1.3     N         

    

 

             BUN/CREATININE RATIO (test code = BUN/CREA) 20.3         10-20     

   H             

 

             CALCIUM (test code = CA) 9.0 mg/dL    8.4-10.2     N             





HEPATIC FUNCTION [id number] 10:16:00* 



             Test Item    Value        Reference Range Interpretation Comments

 

             TOTAL PROTEIN (test code = PROT) 8.2 g/dL     6.5-8.4      N       

      

 

             ALBUMIN (test code = ALB) 3.8 g/dL     3.4-4.8      N             

 

             GLOBULIN (test code = GLOB) 4.4 G/DL     1-10         N            

 

 

             ALBUMIN/GLOBULIN RATIO (test code = A/G) 0.86 RATIO   0.75-1.50    

N             

 

             BILIRUBIN TOTAL (test code = BILT) 1.00 mg/dL   0.0-1.0      N     

        

 

             BILIRUBIN DIRECT (test code = BILD) 0.30 mg/dL   0.0-0.30     N    

         

 

             SGOT/AST (test code = AST) 23 U/L       6-32         N             

 

             SGPT/ALT (test code = ALT) 18 U/L       12-78        N            N

ote: Change in REFERENCE RANGE due to

new reagent method.

 

             ALKALINE PHOSPHATASE TOTAL (test code = ALKP) 109 U/L        

     N             





[id number] 10:16:00* 



             Test Item    Value        Reference Range Interpretation Comments

 

             LIPASE (test code = LIP) 200 U/L      128-270      N             





URINALYSIS [id number] 10:04:00* 



             Test Item    Value        Reference Range Interpretation Comments

 

             UA COLOR (test code = COLU) YELLOW       YELLOW                    

 

 

             UA APPEARANCE (test code = APPU) CLEAR        CLEAR                

      

 

             UA GLUCOSE DIPSTICK (test code = DGLUU) norm mg/dL   NEGATIVE      

             

 

             UA BILIRUBIN DIPSTICK (test code = BILU) 1 mg/dL      NEGATIVE     

A             

 

             UA KETONE DIPSTICK (test code = KETU) 50 (2+) mg/dL NEGATIVE     A 

            

 

             UA SPECIFIC GRAVITY (test code = SGU) 1.020        1.001-1.035     

           

 

             UA BLOOD DIPSTICK (test code = NESS) 25 (1+) Lj/uL NEGATIVE     A  

           

 

             UA PH DIPSTICK (test code = YUMI) 6.0          5.0-8.0              

      

 

             UA PROTEIN DIPSTICK (test code = PROU) 30 (1+) mg/dL Neg-15       A

             

 

             UA UROBILINIOGEN DIPSTICK (test code = URO) norm mg/dL   0.0-0.2   

                 

 

             UA NITRITE DIPSTICK (test code = MARILU) NEGATIVE     NEGATIVE       

            

 

             UA LEUKOCYTE ESTERASE DIPSTICK (test code = LEUU) 500 Catarina/uL (3+) u

L NEGATIVE     A            



 

             UA WBC (test code = WBCU) 20-30 per HPF 0-5          A             

 

             UA RBC (test code = RBCU) 0-3 per HPF  0-5                        

 

             UA EPITHELIAL CELLS (test code = EPIU) None seen per HPF Few       

                 

 

             UA BACTERIA (test code = BACU) FEW per HPF  NONE                   

    





Urine Source? Clean CatchCBC W/O [id number] 10:01:00* 



             Test Item    Value        Reference Range Interpretation Comments

 

             WHITE BLOOD CELL (test code = WBC) 7.4 K/mm3    4.5-12.5     N     

        

 

             RED BLOOD CELL (test code = RBC) 5.41 mill/mm3 3.7-5.2      H      

       

 

             HEMOGLOBIN (test code = HGB) 14.1 gram/dL 11.5-15.5    N           

  

 

             HEMATOCRIT (test code = HCT) 44.0 %       36.0-46.0    N           

  

 

             MEAN CELL VOLUME (test code = MCV) 81.3 fL      80-98        N     

        

 

             MEAN CELL HGB (test code = MCH) 26.1 picogram 27.0-33.0    L       

      

 

             MEAN CELL HGB CONCETRATION (test code = MCHC) 32.0 gram/dL 33.0-36.

0    L             

 

             RED CELL DISTRIBUTION WIDTH (test code = RDW) 13.8 %       11.6-16.

2    N             

 

             RED CELL DISTRIBUTION WIDTH SD (test code = RDW-SD) 41.5 fL      37

.0-51.0    N             

 

             PLATELET COUNT (test code = PLT) 332 K/mm3    150-450      N       

      

 

             MEAN PLATELET VOLUME (test code = MPV) 9.7 fL       6.7-11.0     N 

            





URINALYSIS [id number] 10:01:00* 



             Test Item    Value        Reference Range Interpretation Comments

 

             UA COLOR (test code = COLU) YELLOW       YELLOW                    

 

 

             UA APPEARANCE (test code = APPU) CLEAR        CLEAR                

      

 

             UA GLUCOSE DIPSTICK (test code = DGLUU) norm mg/dL   NEGATIVE      

             

 

             UA BILIRUBIN DIPSTICK (test code = BILU) 1 mg/dL      NEGATIVE     

A             

 

             UA KETONE DIPSTICK (test code = KETU) 50 (2+) mg/dL NEGATIVE     A 

            

 

             UA SPECIFIC GRAVITY (test code = SGU) 1.020        1.001-1.035     

           

 

             UA BLOOD DIPSTICK (test code = NESS) 25 (1+) Lj/uL NEGATIVE     A  

           

 

             UA PH DIPSTICK (test code = YUMI) 6.0          5.0-8.0              

      

 

             UA PROTEIN DIPSTICK (test code = PROU) 30 (1+) mg/dL Neg-15       A

             

 

             UA UROBILINIOGEN DIPSTICK (test code = URO) norm mg/dL   0.0-0.2   

                 

 

             UA NITRITE DIPSTICK (test code = MARILU) NEGATIVE     NEGATIVE       

            

 

             UA LEUKOCYTE ESTERASE DIPSTICK (test code = LEUU) 500 [place]/uL (3+) u

L NEGATIVE     A            



 

             UA WBC (test code = WBCU)  per HPF     0-5                        

 

             UA RBC (test code = RBCU)  per HPF     0-5                        

 

             UA EPITHELIAL CELLS (test code = EPIU)  per HPF     Few            

            

 

             UA BACTERIA (test code = BACU)  per HPF     NONE                   

    





Urine Source? Clean CatchCHEST 2 [id number] 17:52:00                       
                                                              [place] [address]      Patient Name: JOAN RUIZ                             
     MR #: [id number]                     : 1955                       
           Age/Sex: 63/F  Acct #: [id number]                              Req 
#: [id number]  Adm Physician:                                                   
  Ordered by: MARGA PATRICIA NP                            Report #: [id number]  
     Location: ER                                      Room/Bed:                
    ____________________________________________________
_______________________________________________    Procedure: [id number] DX/CHEST
2 VIEWS  Exam Date: 19                            Exam Time:          
                                    REPORT STATUS: Signed    EXAMINATION:  CHEST
2 VIEWS          INDICATION:      Cough      cough    2019       
COMPARISON:  None           FINDINGS:   TUBES and LINES:  None.      LUNGS:  Sylvain
gs are well inflated.  Perihilar peribronchial hazy opacity could be   due to br
onchitis.   There is no evidence of pneumonia or pulmonary edema.      PLEURA:  
No pleural effusion or pneumothorax.      HEART AND MEDIASTINUM:  The cardiomedi
astinal silhouette is unremarkable.          BONES AND SOFT TISSUES:  No acute o
sseous lesion.  Soft tissues are   unremarkable.      UPPER ABDOMEN: No free air
under the diaphragm.          IMPRESSION:    Perihilar peribronchial hazy opaci
ty could be due to bronchitis.         Signed by: Dr. Quiana Mitchell M.D. on 3/22/
2019 5:53 PM        Dictated By: QUIANA MITCHELL MD, MD  Electronically Signed By: 
QUIANA MITCHELL MD, MD on 19  Transcribed By: KACEY on 19  
    COPY TO:   MARGA PATRICIA NP         Influenza Virus Types A,B Antigen
2019 17:41:00* 



             Test Item    Value        Reference Range Interpretation Comments

 

             Influenza Virus Types A,B Antigen (test code = 79278-1) NEGATIVE   

  NEGATIVE                   





[facility] [id number] 14:36:00   
                                                                                
 [place] [address]      Patient Name: JOAN RUIZ               
                   MR #: [id number]                     : 1955         
                         Age/Sex: 63/F  Acct #: [id number]                    
         Req #: [id number]  Adm Physician:                                      
               Ordered by: LUCIUS CORBIN NP                            
Report #: [id number]        Location: ER                                      
Room/Bed:                     _________________________________________________
__________________________________________________    Procedure: [id number] DX/WR
IST COMPLETE LEFT  Exam Date: 19                            Exam Time: 140
5                                              REPORT STATUS: Signed    Exam: Le
ft wrist radiographs-3 views      Indication: Fall with pain in the left wrist. 
    Comparison: None.      Findings:     Diffuse osteopenia. No evidence of acu
te fracture, malalignment, or soft tissue   abnormality. Moderate degenerative c
hanges at the first carpometacarpal joint   with joint space narrowing..      Im
pression:   No acute radiographic abnormality.      Diffuse osteopenia.      Sig
donnie by: Dr. Geno Posey MD on 2019 2:38 PM        Dictated By: GENO POSEY MD
 Electronically Signed By: GENO POSEY MD on 19  Transcribed By: MIRIAM HIRSCH on 19       COPY TO:   LUCIUS CORBIN NP        CT CERVICAL 
SPINE [id number] 14:25:00                                                     
                                [place] [address]      Patient 
Name: JOAN RUIZ                                   MR #: [id number]             
       : 1955                                   Age/Sex: 63/F  Acct #: 
[id number]                              Req #: [id number]  Adm Physician:     
                                                Ordered by: LUCIUS CORBIN NP
                           Report #: [id number]        Location: ER              
                       Room/Bed:                     
_________________________________________________
__________________________________________________    Procedure: [id number] CT/CT
CERVICAL SPINE WO  Exam Date: 19                            Exam Time: 13
45                                              REPORT STATUS: Signed    Exam: H
ead and cervical spine CTs without contrast   Indication: Fall presumably from s
tanding, hit head and passed out   Comparisons: Head and cervical spine CTs .       Technique:   Axial images were obtained from the brain and cervical
spine.   Coronal and sagittal images reconstructed from the axial data.   Dose 
modulation, iterative reconstruction, and/or weight based adjustment    of the m
A/kV was utilized to reduce the radiation dose to as low as reasonably    achiev
able.      Intravenous contrast: None      Findings:      Head CT:      Scalp/sk
ull:    No abnormalities. No fractures, blastic or lytic lesions.      Brain sul
ci: Appropriate for age.   Ventricles: Normal in size and configuration. No hydr
ocephalus.      Extra-axial spaces:    No masses.  No fluid collections.      Pa
renchyma:    Few hypodensities of the periventricular and deep white matter, non
specific   and most commonly seen in mild chronic microvascular ischemic change
s.    No masses, hemorrhage, acute or chronic cortical vascular insults.      Se
llar/suprasellar region: No abnormalities.   Craniocervical junction: Patent for
amen magnum.  No Chiari one malformation.      Cervical spine CT:      Airway: P
atent.      Fractures: None.   Soft tissues: No gross abnormalities.      Atlant
oaxial articulation: Intact.   Alignment: Normal lordosis. No scoliosis.   Cervi
comedullary junction: No abnormalities. Patent foramen magnum.      Vertebrae:  
 No infection or neoplasm.      Degenerative changes:    Moderate right foramin
al stenosis at C5-C6.   Mild to moderate canal stenosis at C6-C7.   Otherwise, n
o significant degenerative changes.      IMPRESSION:      Head CT:   1.  No acut
e abnormality.   2.  No changes when compared to head CT dated 2016.      C
ervical spine CT:   1.  No acute  abnormalities.   2.  Cannot adequately evaluat
e for ligament, spinal cord and or vascular   abnormalities.   3.  Bilateral pre
dominant cervical spondylosis from C5 to C7.      A preliminary report was provi
ded by Dr. Silva on 2019 2:31 PM.      Signed by: DR Elie Villa M.D. on 2019 3:34 PM        Dictated By: ELIE WOOD MD  Electronical
ly Signed By: ELIE WOOD MD on 19 1534  Transcribed By: KACEY on
19 1534       COPY TO:   LUCIUS CORBIN NP        CT BRAIN [id number] 
14:25:00                                                                        
             [place] [address]      Patient Name: JOAN RUIZ   
                               MR #: [id number]                     : 
1955                                   Age/Sex: 63/F  Acct #: [id number]
                             Req #: [id number]  Adm Physician:                  
                                   Ordered by: LUCIUS CORBIN NP             
              Report #: [id number]        Location:                            
          Room/Bed:                     
_________________________________________________
__________________________________________________    Procedure: [id number] CT/CT
BRAIN WO  Exam Date: 19                            Exam Time: 1345        
                                     REPORT STATUS: Signed    Exam: Head and c
ervical spine CTs without contrast   Indication: Fall presumably from standing, 
hit head and passed out   Comparisons: Head and cervical spine CTs 2016.   
   Technique:   Axial images were obtained from the brain and cervical spine.   
Coronal and sagittal images reconstructed from the axial data.   Dose modulatio
n, iterative reconstruction, and/or weight based adjustment    of the mA/kV was 
utilized to reduce the radiation dose to as low as reasonably    achievable.    
 Intravenous contrast: None      Findings:      Head CT:      Scalp/skull:    No
abnormalities. No fractures, blastic or lytic lesions.      Brain sulci: Appro
priate for age.   Ventricles: Normal in size and configuration. No hydrocephalus
.      Extra-axial spaces:    No masses.  No fluid collections.      Parenchyma:
   Few hypodensities of the periventricular and deep white matter, non specific 
 and most commonly seen in mild chronic microvascular ischemic changes.    No 
masses, hemorrhage, acute or chronic cortical vascular insults.      Sellar/supr
asellar region: No abnormalities.   Craniocervical junction: Patent foramen magn
um.  No Chiari one malformation.      Cervical spine CT:      Airway: Patent.   
  Fractures: None.   Soft tissues: No gross abnormalities.      Atlantoaxial ar
ticulation: Intact.   Alignment: Normal lordosis. No scoliosis.   Cervicomedulla
ry junction: No abnormalities. Patent foramen magnum.      Vertebrae:    No infe
ction or neoplasm.      Degenerative changes:    Moderate right foraminal stenos
is at C5-C6.   Mild to moderate canal stenosis at C6-C7.   Otherwise, no signifi
cant degenerative changes.      IMPRESSION:      Head CT:   1.  No acute abnorma
lity.   2.  No changes when compared to head CT dated 2016.      Cervical s
pine CT:   1.  No acute  abnormalities.   2.  Cannot adequately evaluate for lig
ament, spinal cord and or vascular   abnormalities.   3.  Bilateral predominant 
cervical spondylosis from C5 to C7.      A preliminary report was provided by Dr Juan A Silva on 2019 2:31 PM.      Signed by: DR Elie Villa M.D. on  3:34 PM        Dictated By: ELIE WOOD MD  Electronically Signed
By: ELIE WOOD MD on 19  Transcribed By: KACEY on 19       COPY TO:   LUCIUS CORBIN NP        HIP LEFT 2-3 VW (+/- PELVIS)
2019 14:18:00                                                             
                        [place] [address]      Patient Name: 
JOAN RUIZ                                   MR #: [id number]                   
 : 1955                                   Age/Sex: 63/F  Acct #: 
[id number]                              Req #: [id number]  Adm Physician:     
                                                Ordered by: LUCIUS CORBIN NP
                           Report #: [id number]        Location: ER              
                       Room/Bed:                     
_________________________________________________
__________________________________________________    Procedure: [id number] DX/HI
P LEFT 2-3 VW (+/- PELVIS)  Exam Date:                             Exam Time:   
                                           REPORT STATUS: Signed       Exam:  L
eft hip radiographs- 2 views; AP radiograph of the pelvis - 1 view      Indicati
on: Status post fall with pain to left hip.       Comparison: None.      Finding
s:    Left hip:    No evidence of acute fracture or malalignment.      Pelvis:  
No evidence of acute fracture or malalignment. Mild degenerative changes of   b
ilateral hips and pubic symphysis.      Surgical clips project over the lower ab
domen.      Impression:   No acute radiographic abnormality.         Signed by: 
Dr. Geno Posey MD on 2019 2:20 PM        Dictated By: GENO POSEY MD  Electr
onically Signed By: GENO POSEY MD on 19  Transcribed By: KACEY on 0
19       COPY TO:   LUCIUS CORBIN NP        Troponin -42-59 
05:02:00* 



             Test Item    Value        Reference Range Interpretation Comments

 

             Troponin I (test code = MJC1428) -0.001       0-0.300              

      





[facility] 



             Test Item    Value        Reference Range Interpretation Comments

 

             Troponin I (test code = ONF7594) < 0.001      0-0.300              

      





[facility] -66-02 05:02:00* 



             Test Item    Value        Reference Range Interpretation Comments

 

             Troponin I (test code = POS4601) < 0.001      0-0.300              

      





[facility] Jugwo0847-96-20 04:28:00* 



             Test Item    Value        Reference Range Interpretation Comments

 

             Sodium Level (test code = 2951-2) 137          136-145             

       





[facility]iscoPot[facility] Brcrx7893-82-99 04:28:00* 



             Test Item    Value        Reference Range Interpretation Comments

 

             Potassium Level (test code = 2823-3) 4.7          3.5-5.1          

          





[facility]iscoChloride Akpft6056-63-09 04:28:00* 



             Test Item    Value        Reference Range Interpretation Comments

 

             Chloride Level (test code = 2075-0) 106                      

         





[facility]iscoCar[facility] Dioxide Yrxje7890-17-56 04:28:00* 



             Test Item    Value        Reference Range Interpretation Comments

 

             Carbon Dioxide Level (test code = 2028-9) 21           22-29       

 L             





[facility]iscoAnion Mis7083-98-71 04:28:00* 



             Test Item    Value        Reference Range Interpretation Comments

 

             Anion Gap (test code = 33037-3) 14.7         8-16                  

     





[facility]iscoBlood Urea Ntsdkdlg4812-00-12 04:28:00* 



             Test Item    Value        Reference Range Interpretation Comments

 

             Blood Urea Nitrogen (test code = 3094-0) 10           7-26         

              





[facility]iscoCreatinine2018-05-28 04:28:00* 



             Test Item    Value        Reference Range Interpretation Comments

 

             Creatinine (test code = 2160-0) 0.69         0.57-1.11             

     





[facility]iscoBUN/Creatinine Vaoci4550-77-57 04:28:00* 



             Test Item    Value        Reference Range Interpretation Comments

 

             BUN/Creatinine Ratio (test code = 3097-3) 14           6-25        

               





[facility]iscoEstimat Glomerular Filtration Rate
2018 04:28:00* 



             Test Item    Value        Reference Range Interpretation Comments

 

             Estimat Glomerular Filtration Rate (test code = 54645-6) 60-       

   >60                        





Ranges were taken from the National Kidney Disease Education Program and the [facility] Foundation literature.Reference ranges:60 or greater: Xhdkah26-54 (
for 3 consecutive months): Chronic kidney disease 15 or less: Kidney failureTexas Health FriscoGlucose Ekyyz1397-67-04 04:28:00* 



             Test Item    Value        Reference Range Interpretation Comments

 

             Glucose Level (test code = MBI9111) 94                       

         





[facility]iscoCalcium Abbkm8024-76-93 04:28:00* 



             Test Item    Value        Reference Range Interpretation Comments

 

             Calcium Level (test code = 68438-5) 9.0          8.4-10.2          

         





[facility]iscoTotal Uskoapjxd6602-59-63 04:28:00* 



             Test Item    Value        Reference Range Interpretation Comments

 

             Total Bilirubin (test code = 1975-2) 0.6          0.2-1.2          

          





[facility] FriscoAspartate Amino Transf (AST/SGOT)
2018 04:28:00* 



             Test Item    Value        Reference Range Interpretation Comments

 

                                        Aspartate Amino Transf (AST/SGOT) (test 

code = Aspartate Amino Transf 

(AST/SGOT))     23              5-34                             





[facility]iscoAlanine Aminotransferase (ALT/SGPT)
2018 04:28:00* 



             Test Item    Value        Reference Range Interpretation Comments

 

             Alanine Aminotransferase (ALT/SGPT) (test code = 1742-6) 13        

   0-55                       





[facility]iscoTotal Goxxzka2692-01-08 04:28:00* 



             Test Item    Value        Reference Range Interpretation Comments

 

             Total Protein (test code = 2885-2) 7.5          6.5-8.1            

        





[facility]iscoAlbumin2018-05-28 04:28:00* 



             Test Item    Value        Reference Range Interpretation Comments

 

             Albumin (test code = 1751-7) 3.8          3.5-5.0                  

  





[facility]iscoGlobulin2018-05-28 04:28:00* 



             Test Item    Value        Reference Range Interpretation Comments

 

             Globulin (test code = 35889-1) 3.7          2.3-3.5      H         

    





[facility]iscoAlbumin/Globulin Fthdw0463-52-36 04:28:00
  * 



             Test Item    Value        Reference Range Interpretation Comments

 

             Albumin/Globulin Ratio (test code = 1759-0) 1.0          0.8-2.0   

                 





[facility]iscoAlkaline Mvwsbkikhut9253-43-14 04:28:00* 



             Test Item    Value        Reference Range Interpretation Comments

 

             Alkaline Phosphatase (test code = 6768-6) 90                 

               





[facility]iscoCreatine Wwumco4857-54-57 04:28:00* 



             Test Item    Value        Reference Range Interpretation Comments

 

             Creatine Kinase (test code = 2157-6) 67                      

          





[facility]iscoCreatine Kinase HS0297-60-35 04:28:00* 



             Test Item    Value        Reference Range Interpretation Comments

 

             Creatine Kinase MB (test code = 34062-7) 0.70         0-5.0        

              





[facility] FriscoAmylase Lytsd1664-73-83 04:28:00* 



             Test Item    Value        Reference Range Interpretation Comments

 

             Amylase Level (test code = 1798-8) 68                        

        





[facility] FriscoLipase2018-05-28 04:28:00* 



             Test Item    Value        Reference Range Interpretation Comments

 

             Lipase (test code = 3040-3) 37           8-78                      

 





[facility]ieodium Zkaye7549-56-07 04:28:00* 



             Test Item    Value        Reference Range Interpretation Comments

 

             Sodium Level (test code = 2951-2) 137          136-145             

       





[facility]iscoPotassium Agnzt2575-36-49 04:28:00* 



             Test Item    Value        Reference Range Interpretation Comments

 

             Potassium Level (test code = 2823-3) 4.7          3.5-5.1          

          





[facility]iscoChloride Wdvck2388-04-32 04:28:00* 



             Test Item    Value        Reference Range Interpretation Comments

 

             Chloride Level (test code = 2075-0) 106                      

         





[facility]iscoCarbon Dioxide Znfau7219-75-19 04:28:00* 



             Test Item    Value        Reference Range Interpretation Comments

 

             Carbon Dioxide Level (test code = 2028-9) 21           22-29       

 L             





[facility]iscoAnion Gfr3740-28-61 04:28:00* 



             Test Item    Value        Reference Range Interpretation Comments

 

             Anion Gap (test code = 33037-3) 14.7         8-16                  

     





[facility]iscoBlood Urea Ycenslkw6141-80-96 04:28:00* 



             Test Item    Value        Reference Range Interpretation Comments

 

             Blood Urea Nitrogen (test code = 3094-0) 10           7-26         

              





[facility]iscoCreatinine2018-05-28 04:28:00* 



             Test Item    Value        Reference Range Interpretation Comments

 

             Creatinine (test code = 2160-0) 0.69         0.57-1.11             

     





[facility]iscoBUN/Creatinine Ukwrt2699-09-15 04:28:00* 



             Test Item    Value        Reference Range Interpretation Comments

 

             BUN/Creatinine Ratio (test code = 3097-3) 14           6-25        

               





[facility] FriscoEstimat Glomerular Filtration Rate
2018 04:28:00* 



             Test Item    Value        Reference Range Interpretation Comments

 

             Estimat Glomerular Filtration Rate (test code = 611437479) > 60    

     >60                        





Ranges were taken from the National Kidney Disease Education Program and the [facility] literature.Reference ranges:60 or greater: Ytqijc98-76 (
for 3 consecutive months): Chronic kidney disease 15 or less: Kidney failureTexas Health FriscoGlucose Upvai3688-14-10 04:28:00* 



             Test Item    Value        Reference Range Interpretation Comments

 

             Glucose Level (test code = YIO2530) 94                       

         





[facility]iscoCalcium Beocl1412-51-37 04:28:00* 



             Test Item    Value        Reference Range Interpretation Comments

 

             Calcium Level (test code = 38874-6) 9.0          8.4-10.2          

         





[facility] FriscoTotal Urnflyvon9344-81-70 04:28:00* 



             Test Item    Value        Reference Range Interpretation Comments

 

             Total Bilirubin (test code = 1975-2) 0.6          0.2-1.2          

          





[facility]iscoAspartate Amino Transf [facility]/SGOT)
2018 04:28:00* 



             Test Item    Value        Reference Range Interpretation Comments

 

                                        Aspartate Amino Transf (AST/SGOT) (test 

code = Aspartate Amino Transf 

(AST/SGOT))     23              5-34                             





[facility]iscoAlanine Aminotransferase (ALT/SGPT)
2018 04:28:00* 



             Test Item    Value        Reference Range Interpretation Comments

 

             Alanine Aminotransferase (ALT/SGPT) (test code = 1742-6) 13        

   0-55                       





[facility]iscoTotal Oztonla2651-31-40 04:28:00* 



             Test Item    Value        Reference Range Interpretation Comments

 

             Total Protein (test code = 2885-2) 7.5          6.5-8.1            

        





[facility]iscoAlbumin2018-05-28 04:28:00* 



             Test Item    Value        Reference Range Interpretation Comments

 

             Albumin (test code = 1751-7) 3.8          3.5-5.0                  

  





[facility]iscoGlobulin2018-05-28 04:28:00* 



             Test Item    Value        Reference Range Interpretation Comments

 

             Globulin (test code = 28331-1) 3.7          2.3-3.5      H         

    





[facility]iscoAlbumin/Globulin Kqwno7870-66-85 04:28:00
  * 



             Test Item    Value        Reference Range Interpretation Comments

 

             Albumin/Globulin Ratio (test code = 1759-0) 1.0          0.8-2.0   

                 





[facility]iscoAlkaline Pzskegiuvtm9156-03-49 04:28:00* 



             Test Item    Value        Reference Range Interpretation Comments

 

             Alkaline Phosphatase (test code = 6768-6) 90                 

               





[facility]iscoCreatine Epfurd8389-51-07 04:28:00* 



             Test Item    Value        Reference Range Interpretation Comments

 

             Creatine Kinase (test code = 2157-6) 67                      

          





[facility]iscoCreatine Kinase [id number] 04:28:00* 



             Test Item    Value        Reference Range Interpretation Comments

 

             Creatine Kinase MB (test code = 71409-2) 0.70         0-5.0        

              





[facility]iscoAmylase Gqjai6302-91-61 04:28:00* 



             Test Item    Value        Reference Range Interpretation Comments

 

             Amylase Level (test code = 1798-8) 68                        

        





[facility]iscoLipase2018-05-28 04:28:00* 



             Test Item    Value        Reference Range Interpretation Comments

 

             Lipase (test code = 3040-3) 37           8-78                      

 





[facility] Xrmjo7868-01-90 04:28:00* 



             Test Item    Value        Reference Range Interpretation Comments

 

             Sodium Level (test code = 2951-2) 137          136-145             

       





[facility]iscoPotassium Oywpl8667-49-16 04:28:00* 



             Test Item    Value        Reference Range Interpretation Comments

 

             Potassium Level (test code = 2823-3) 4.7          3.5-5.1          

          





[facility]iscoChloride Qvfzn9778-72-71 04:28:00* 



             Test Item    Value        Reference Range Interpretation Comments

 

             Chloride Level (test code = 2075-0) 106                      

         





[facility]iscoCarbon Dioxide Obmuv9807-99-18 04:28:00* 



             Test Item    Value        Reference Range Interpretation Comments

 

             Carbon Dioxide Level (test code = 2028-9) 21           22-29       

 L             





[facility]iscoAnion Jui3416-62-63 04:28:00* 



             Test Item    Value        Reference Range Interpretation Comments

 

             Anion Gap (test code = 33037-3) 14.7         8-16                  

     





[facility]iscoBlood Urea Wimwjutu0266-25-60 04:28:00* 



             Test Item    Value        Reference Range Interpretation Comments

 

             Blood Urea Nitrogen (test code = 3094-0) 10           7-26         

              





[facility]iscoCreatinine2018-05-28 04:28:00* 



             Test Item    Value        Reference Range Interpretation Comments

 

             Creatinine (test code = 2160-0) 0.69         0.57-1.11             

     





[facility]iscoBUN/Creatinine Dsbad0138-57-46 04:28:00* 



             Test Item    Value        Reference Range Interpretation Comments

 

             BUN/Creatinine Ratio (test code = 3097-3) 14           6-25        

               





[facility] FriscoEstimat Glomerular Filtration Rate
2018 04:28:00* 



             Test Item    Value        Reference Range Interpretation Comments

 

             Estimat Glomerular Filtration Rate (test code = 574008777) > 60    

     >60                        





Ranges were taken from the National Kidney Disease Education Program and the [facility] literature.Reference ranges:60 or greater: Mkhxjn83-12 (
for 3 consecutive months): Chronic kidney disease 15 or less: Kidney failureTexas Health FriscoGlucose Niccr7432-39-32 04:28:00* 



             Test Item    Value        Reference Range Interpretation Comments

 

             Glucose Level (test code = TYF2198) 94                       

         





[facility]iscoCalcium Lkwet6834-56-81 04:28:00* 



             Test Item    Value        Reference Range Interpretation Comments

 

             Calcium Level (test code = 70186-3) 9.0          8.4-10.2          

         





[facility]iscoTotal Bcspseajw3813-15-63 04:28:00* 



             Test Item    Value        Reference Range Interpretation Comments

 

             Total Bilirubin (test code = 1975-2) 0.6          0.2-1.2          

          





[facility]iscoAspartate Amino Transf (AST/SGOT)
2018 04:28:00* 



             Test Item    Value        Reference Range Interpretation Comments

 

                                        Aspartate Amino Transf (AST/SGOT) (test 

code = Aspartate Amino Transf 

(AST/SGOT))     23              5-34                             





[facility]iscoAlanine Aminotransferase (ALT/SGPT)
2018 04:28:00* 



             Test Item    Value        Reference Range Interpretation Comments

 

             Alanine Aminotransferase (ALT/SGPT) (test code = 1742-6) 13        

   0-55                       





[facility]iscoTotal Xstpfmr6349-56-83 04:28:00* 



             Test Item    Value        Reference Range Interpretation Comments

 

             Total Protein (test code = 2885-2) 7.5          6.5-8.1            

        





[facility]iscoAlbumin2018-05-28 04:28:00* 



             Test Item    Value        Reference Range Interpretation Comments

 

             Albumin (test code = 1751-7) 3.8          3.5-5.0                  

  





[facility]iscoGlobulin2018-05-28 04:28:00* 



             Test Item    Value        Reference Range Interpretation Comments

 

             Globulin (test code = 56929-8) 3.7          2.3-3.5      H         

    





[facility]iscoAlbumin/Globulin Gccps0235-51-36 04:28:00
  * 



             Test Item    Value        Reference Range Interpretation Comments

 

             Albumin/Globulin Ratio (test code = 1759-0) 1.0          0.8-2.0   

                 





[facility] FriscoAlkaline Zixgrmefjvf1174-19-86 04:28:00* 



             Test Item    Value        Reference Range Interpretation Comments

 

             Alkaline Phosphatase (test code = 6768-6) 90                 

               





[facility]iscoCreatine Yqayru0336-84-91 04:28:00* 



             Test Item    Value        Reference Range Interpretation Comments

 

             Creatine Kinase (test code = 2157-6) 67                      

          





[facility]iscoCreatine Kinase DZ2702-32-63 04:28:00* 



             Test Item    Value        Reference Range Interpretation Comments

 

             Creatine Kinase MB (test code = 65596-6) 0.70         0-5.0        

              





[facility]iscoAmylase Iybpo7467-03-56 04:28:00* 



             Test Item    Value        Reference Range Interpretation Comments

 

             Amylase Level (test code = 1798-8) 68                        

        





[facility]iscoLipase2018-05-28 04:28:00* 



             Test Item    Value        Reference Range Interpretation Comments

 

             Lipase (test code = 3040-3) 37           8-78                      

 





[facility]iscoWhite Blood Wwpsx5257-76-43 04:08:00* 



             Test Item    Value        Reference Range Interpretation Comments

 

             White Blood Count (test code = 6690-2) 4.07         4.8-10.8     L 

            





[facility]iscoRed Blood Ufemy3249-63-53 04:08:00* 



             Test Item    Value        Reference Range Interpretation Comments

 

             Red Blood Count (test code = 789-8) 4.11         3.6-5.1           

         





[facility]iscoHemoglobin2018-05-28 04:08:00* 



             Test Item    Value        Reference Range Interpretation Comments

 

             Hemoglobin (test code = 42242-3) 10.9         12.0-16.0    L       

      





[facility]iscoHematocrit2018-05-28 04:08:00* 



             Test Item    Value        Reference Range Interpretation Comments

 

             Hematocrit (test code = 4544-3) 35.2         34.2-44.1             

     





[facility]iscoMean Corpuscular Lkvupm3163-80-44 
04:08:00* 



             Test Item    Value        Reference Range Interpretation Comments

 

             Mean Corpuscular Volume (test code = 787-2) 85.6         81-99     

                 





[facility]iscoMean Corpuscular Gsbppcyruu1665-47-41 
04:08:00* 



             Test Item    Value        Reference Range Interpretation Comments

 

             Mean Corpuscular Hemoglobin (test code = 785-6) 26.5         28-32 

       L             





[facility]iscoMean Corpuscular Hemoglobin Concent
2018 04:08:00* 



             Test Item    Value        Reference Range Interpretation Comments

 

             Mean Corpuscular Hemoglobin Concent (test code = 786-4) 31.0       

  31-35                      





[facility]iscoRed Cell Distribution Qqbyy2507-99-63 
04:08:00* 



             Test Item    Value        Reference Range Interpretation Comments

 

             Red Cell Distribution Width (test code = 50735-6) 13.4         11.7

-14.4                  





[facility]iscoPlatelet Twpuw9299-93-89 04:08:00* 



             Test Item    Value        Reference Range Interpretation Comments

 

             Platelet Count (test code = 777-3) 187          140-360            

        





[facility]iscoNeutrophils (%) (Auto)2018 04:08:00
  * 



             Test Item    Value        Reference Range Interpretation Comments

 

             Neutrophils (%) (Auto) (test code = 80396-6) 52.1         38.7-80.0

                  





[facility]iscoLymphocytes (%) (Auto)2018 04:08:00
  * 



             Test Item    Value        Reference Range Interpretation Comments

 

             Lymphocytes (%) (Auto) (test code = 736-9) 33.9         18.0-39.1  

                





[facility]iscoMonocytes (%) (Auto)2018 04:08:00* 



             Test Item    Value        Reference Range Interpretation Comments

 

             Monocytes (%) (Auto) (test code = 5905-5) 11.3         4.4-11.3    

               





[facility]iscoEosinophils (%) (Auto)2018 04:08:00
  * 



             Test Item    Value        Reference Range Interpretation Comments

 

             Eosinophils (%) (Auto) (test code = 713-8) 2.0          0.0-6.0    

                





[facility]iscoBasophils (%) (Auto)2018 04:08:00* 



             Test Item    Value        Reference Range Interpretation Comments

 

             Basophils (%) (Auto) (test code = 706-2) 0.5          0.0-1.0      

              





[facility]iscoIM GRANULOCYTES %2018 04:08:00* 



             Test Item    Value        Reference Range Interpretation Comments

 

             IM GRANULOCYTES % (test code = IM GRANULOCYTES %) 0.2          0.0-

1.0                    





[facility]iscoNeutrophils # (Auto)2018 04:08:00* 



             Test Item    Value        Reference Range Interpretation Comments

 

             Neutrophils # (Auto) (test code = 751-8) 2.1          2.1-6.9      

              





[facility]iscoLymphocytes # (Auto)2018 04:08:00* 



             Test Item    Value        Reference Range Interpretation Comments

 

             Lymphocytes # (Auto) (test code = 82043-8) 1.4          1.0-3.2    

                





[facility]iscoMonocytes # (Auto)2018 04:08:00* 



             Test Item    Value        Reference Range Interpretation Comments

 

             Monocytes # (Auto) (test code = 742-7) 0.5          0.2-0.8        

            





[facility]iscoEosinophils # (Auto)2018 04:08:00* 



             Test Item    Value        Reference Range Interpretation Comments

 

             Eosinophils # (Auto) (test code = 711-2) 0.1          0.0-0.4      

              





[facility]iscoBasophils # (Auto)2018 04:08:00* 



             Test Item    Value        Reference Range Interpretation Comments

 

             Basophils # (Auto) (test code = 704-7) 0.0          0.0-0.1        

            





[facility]iscoAbsolute Immature Granulocyte (auto
2018 04:08:00* 



             Test Item    Value        Reference Range Interpretation Comments

 

                                        Absolute Immature Granulocyte (auto (andrade

t code = Absolute Immature Granulocyte 

(auto)          0.01            0-0.1                            





[facility]iscoWhite Blood Jyvmh1193-08-49 04:08:00* 



             Test Item    Value        Reference Range Interpretation Comments

 

             White Blood Count (test code = 6690-2) 4.07         4.8-10.8     L 

            





[facility]iscoRed Blood Lflup4605-58-08 04:08:00* 



             Test Item    Value        Reference Range Interpretation Comments

 

             Red Blood Count (test code = 789-8) 4.11         3.6-5.1           

         





[facility]iscoHemoglobin2018-05-28 04:08:00* 



             Test Item    Value        Reference Range Interpretation Comments

 

             Hemoglobin (test code = 07129-2) 10.9         12.0-16.0    L       

      





[facility]iscoHematocrit2018-05-28 04:08:00* 



             Test Item    Value        Reference Range Interpretation Comments

 

             Hematocrit (test code = 4544-3) 35.2         34.2-44.1             

     





[facility]iscoMean Corpuscular Emlujq0401-30-82 
04:08:00* 



             Test Item    Value        Reference Range Interpretation Comments

 

             Mean Corpuscular Volume (test code = 787-2) 85.6         81-99     

                 





[facility]iscoMean Corpuscular Novkvdxxwb7334-55-07 
04:08:00* 



             Test Item    Value        Reference Range Interpretation Comments

 

             Mean Corpuscular Hemoglobin (test code = 785-6) 26.5         28-32 

       L             





[facility]iscoMean Corpuscular Hemoglobin Concent
2018 04:08:00* 



             Test Item    Value        Reference Range Interpretation Comments

 

             Mean Corpuscular Hemoglobin Concent (test code = 786-4) 31.0       

  31-35                      





[facility]iscoRed Cell Distribution Ffxtm5423-48-80 
04:08:00* 



             Test Item    Value        Reference Range Interpretation Comments

 

             Red Cell Distribution Width (test code = 06853-1) 13.4         11.7

-14.4                  





[facility]iscoPlatelet Hyoxx1451-37-68 04:08:00* 



             Test Item    Value        Reference Range Interpretation Comments

 

             Platelet Count (test code = 777-3) 187          140-360            

        





[facility]iscoNeutrophils (%) (Auto)2018 04:08:00
  * 



             Test Item    Value        Reference Range Interpretation Comments

 

             Neutrophils (%) (Auto) (test code = 06295-7) 52.1         38.7-80.0

                  





[facility]iscoLymphocytes (%) (Auto)2018 04:08:00
  * 



             Test Item    Value        Reference Range Interpretation Comments

 

             Lymphocytes (%) (Auto) (test code = 736-9) 33.9         18.0-39.1  

                





[facility]iscoMonocytes (%) (Auto)2018 04:08:00* 



             Test Item    Value        Reference Range Interpretation Comments

 

             Monocytes (%) (Auto) (test code = 5905-5) 11.3         4.4-11.3    

               





[facility]iscoEosinophils (%) (Auto)2018 04:08:00
  * 



             Test Item    Value        Reference Range Interpretation Comments

 

             Eosinophils (%) (Auto) (test code = 713-8) 2.0          0.0-6.0    

                





[facility]iscoBasophils (%) (Auto)2018 04:08:00* 



             Test Item    Value        Reference Range Interpretation Comments

 

             Basophils (%) (Auto) (test code = 706-2) 0.5          0.0-1.0      

              





[facility]iscoIM GRANULOCYTES %2018 04:08:00* 



             Test Item    Value        Reference Range Interpretation Comments

 

             IM GRANULOCYTES % (test code = IM GRANULOCYTES %) 0.2          0.0-

1.0                    





[facility]iscoNeutrophils # (Auto)2018 04:08:00* 



             Test Item    Value        Reference Range Interpretation Comments

 

             Neutrophils # (Auto) (test code = 751-8) 2.1          2.1-6.9      

              





[facility]iscoLymphocytes # (Auto)2018 04:08:00* 



             Test Item    Value        Reference Range Interpretation Comments

 

             Lymphocytes # (Auto) (test code = 07061-6) 1.4          1.0-3.2    

                





[facility]iscoMonocytes # (Auto)2018 04:08:00* 



             Test Item    Value        Reference Range Interpretation Comments

 

             Monocytes # (Auto) (test code = 742-7) 0.5          0.2-0.8        

            





[facility]iscoEosinophils # (Auto)2018 04:08:00* 



             Test Item    Value        Reference Range Interpretation Comments

 

             Eosinophils # (Auto) (test code = 711-2) 0.1          0.0-0.4      

              





[facility]iscoBasophils # (Auto)2018 04:08:00* 



             Test Item    Value        Reference Range Interpretation Comments

 

             Basophils # (Auto) (test code = 704-7) 0.0          0.0-0.1        

            





[facility]iscoAbsolute Immature Granulocyte (auto
2018 04:08:00* 



             Test Item    Value        Reference Range Interpretation Comments

 

                                        Absolute Immature Granulocyte (auto (andrade

t code = Absolute Immature Granulocyte 

(auto)          0.01            0-0.1                            





[facility]iscoWhite Blood Jvesw2165-78-92 04:08:00* 



             Test Item    Value        Reference Range Interpretation Comments

 

             White Blood Count (test code = 6690-2) 4.07         4.8-10.8     L 

            





[facility]iscoRed Blood Cljwr9540-60-61 04:08:00* 



             Test Item    Value        Reference Range Interpretation Comments

 

             Red Blood Count (test code = 789-8) 4.11         3.6-5.1           

         





[facility]iscoHemoglobin2018-05-28 04:08:00* 



             Test Item    Value        Reference Range Interpretation Comments

 

             Hemoglobin (test code = 14565-5) 10.9         12.0-16.0    L       

      





[facility]iscoHematocrit2018-05-28 04:08:00* 



             Test Item    Value        Reference Range Interpretation Comments

 

             Hematocrit (test code = 4544-3) 35.2         34.2-44.1             

     





[facility]iscoMean Corpuscular Zuuvox1937-59-04 
04:08:00* 



             Test Item    Value        Reference Range Interpretation Comments

 

             Mean Corpuscular Volume (test code = 787-2) 85.6         81-99     

                 





[facility]iscoMean Corpuscular Aqsanksmzv8832-21-29 
04:08:00* 



             Test Item    Value        Reference Range Interpretation Comments

 

             Mean Corpuscular Hemoglobin (test code = 785-6) 26.5         28-32 

       L             





[facility]iscoMean Corpuscular Hemoglobin Concent
2018 04:08:00* 



             Test Item    Value        Reference Range Interpretation Comments

 

             Mean Corpuscular Hemoglobin Concent (test code = 786-4) 31.0       

  31-35                      





[facility]iscoRed Cell Distribution Vkrmc4979-23-30 
04:08:00* 



             Test Item    Value        Reference Range Interpretation Comments

 

             Red Cell Distribution Width (test code = 27863-1) 13.4         11.7

-14.4                  





[facility]iscoPlatelet Xphqz0803-49-05 04:08:00* 



             Test Item    Value        Reference Range Interpretation Comments

 

             Platelet Count (test code = 777-3) 187          140-360            

        





[facility]iscoNeutrophils (%) (Auto)2018 04:08:00
  * 



             Test Item    Value        Reference Range Interpretation Comments

 

             Neutrophils (%) (Auto) (test code = 59814-5) 52.1         38.7-80.0

                  





[facility]iscoLymphocytes (%) (Auto)2018 04:08:00
  * 



             Test Item    Value        Reference Range Interpretation Comments

 

             Lymphocytes (%) (Auto) (test code = 736-9) 33.9         18.0-39.1  

                





[facility]iscoMonocytes (%) (Auto)2018 04:08:00* 



             Test Item    Value        Reference Range Interpretation Comments

 

             Monocytes (%) (Auto) (test code = 5905-5) 11.3         4.4-11.3    

               





[facility]iscoEosinophils (%) (Auto)2018 04:08:00
  * 



             Test Item    Value        Reference Range Interpretation Comments

 

             Eosinophils (%) (Auto) (test code = 713-8) 2.0          0.0-6.0    

                





[facility]iscoBasophils (%) (Auto)2018 04:08:00* 



             Test Item    Value        Reference Range Interpretation Comments

 

             Basophils (%) (Auto) (test code = 706-2) 0.5          0.0-1.0      

              





[facility]iscoIM GRANULOCYTES %2018 04:08:00* 



             Test Item    Value        Reference Range Interpretation Comments

 

             IM GRANULOCYTES % (test code = IM GRANULOCYTES %) 0.2          0.0-

1.0                    





[facility]iscoNeutrophils # (Auto)2018 04:08:00* 



             Test Item    Value        Reference Range Interpretation Comments

 

             Neutrophils # (Auto) (test code = 751-8) 2.1          2.1-6.9      

              





[facility]iscoLymphocytes # (Auto)2018 04:08:00* 



             Test Item    Value        Reference Range Interpretation Comments

 

             Lymphocytes # (Auto) (test code = 72488-1) 1.4          1.0-3.2    

                





[facility]iscoMonocytes # (Auto)2018 04:08:00* 



             Test Item    Value        Reference Range Interpretation Comments

 

             Monocytes # (Auto) (test code = 742-7) 0.5          0.2-0.8        

            





[facility]iscoEosinophils # (Auto)2018 04:08:00* 



             Test Item    Value        Reference Range Interpretation Comments

 

             Eosinophils # (Auto) (test code = 711-2) 0.1          0.0-0.4      

              





[facility]iscoBasophils # (Auto)2018 04:08:00* 



             Test Item    Value        Reference Range Interpretation Comments

 

             Basophils # (Auto) (test code = 704-7) 0.0          0.0-0.1        

            





[facility]iscoAbsolute Immature Granulocyte (auto
2018 04:08:00* 



             Test Item    Value        Reference Range Interpretation Comments

 

                                        Absolute Immature Granulocyte (auto (andrade

t code = Absolute Immature Granulocyte 

(auto)          0.01            0-0.1                            





[facility] [id number] 04[facility]07[facility]00* 



             Test Item    Value        Reference Range Interpretation Comments

 

             Urine WBC (test code = 5821-4) 11-20        0-5          H         

    





[facility] [id number] 04:07:00* 



             Test Item    Value        Reference Range Interpretation Comments

 

             Urine RBC (test code = 39772-7) 6-10         0-5          H        

     





[facility] Ymrorylv2893-66-26 04:07:00* 



             Test Item    Value        Reference Range Interpretation Comments

 

             Urine Bacteria (test code = 63858-3) RARE         NONE             

          





[facility] Epithelial Cnley5043-60-82 04:07:00
  * 



             Test Item    Value        Reference Range Interpretation Comments

 

             Urine Epithelial Cells (test code = 36488-5) FEW          NONE     

                  





[facility] Transitional Epithelial Cells
2018 04:07:00* 



             Test Item    Value        Reference Range Interpretation Comments

 

             Urine Transitional Epithelial Cells (test code = 8249-5) FEW       

   NONE         H             





[facility] [id number] 04:07:00* 



             Test Item    Value        Reference Range Interpretation Comments

 

             Urine WBC (test code = 5821-4) 11-20        0-5          H         

    





[facility] [id number] 04:07:00* 



             Test Item    Value        Reference Range Interpretation Comments

 

             Urine RBC (test code = 38199-1) 6-10         0-5          H        

     





[facility] Sajeizqx7662-51-81 04:07:00* 



             Test Item    Value        Reference Range Interpretation Comments

 

             Urine Bacteria (test code = 33858-0) RARE         [facility] Vnsrb0534-84-85 04:07:00
  * 



             Test Item    Value        Reference Range Interpretation Comments

 

             Urine Epithelial Cells (test code = 80132-2) FEW          NONE     

                  





[facility] Transitional Epithelial Cells
2018 04:07:00* 



             Test Item    Value        Reference Range Interpretation Comments

 

             Urine Transitional Epithelial Cells (test code = 8249-5) FEW       

   NONE         H             





[facility] VIQ1300-13-40 04:07:00* 



             Test Item    Value        Reference Range Interpretation Comments

 

             Urine WBC (test code = 5821-4) 11-20        0-5          H         

    





[facility] [id number] 04:07:00* 



             Test Item    Value        Reference Range Interpretation Comments

 

             Urine RBC (test code = 10466-4) 6-10         0-5          H        

     





[facility] Rstjlsxt4696-33-49 04:07:00* 



             Test Item    Value        Reference Range Interpretation Comments

 

             Urine Bacteria (test code = 45591-8) RARE         [facility] Ep[facility]helial Wwayp0016-67-06 04:07:00
  * 



             Test Item    Value        Reference Range Interpretation Comments

 

             Urine Epithelial Cells (test code = 44979-7) FEW          NONE     

                  





[facility] Transitional Epithelial Cells
2018 04:07:00* 



             Test Item    Value        Reference Range Interpretation Comments

 

             Urine Transitional Epithelial Cells (test code = 8249-5) FEW       

   NONE         H             





[facility] Mvnyi9950-75-69 04:00:00* 



             Test Item    Value        Reference Range Interpretation Comments

 

             Urine Color (test code = 5778-6) YELLOW       YELLOW               

      





[facility] Pntwych6852-12-12 04:00:00* 



             Test Item    Value        Reference Range Interpretation Comments

 

             Urine Clarity (test code = 46776-3) CLEAR        CLEAR             

         





[facility] Specific Wlpqdzk8907-18-67 04:00:00
  * 



             Test Item    Value        Reference Range Interpretation Comments

 

             Urine Specific Gravity (test code = 5811-5) 1.015        1.010-1.02

5                





[facility] [id number] 04:00:00* 



             Test Item    Value        Reference Range Interpretation Comments

 

             Urine pH (test code = 95883-9) 8            5-7          H         

    





[facility] Leukocyte Zltlopsi1887-13-34 
04:00:00* 



             Test Item    Value        Reference Range Interpretation Comments

 

             Urine Leukocyte Esterase (test code = 5799-2) TRACE        NEGATIVE

     H             





[facility] Ssyspar4064-06-78 04:00:00* 



             Test Item    Value        Reference Range Interpretation Comments

 

             Urine Nitrite (test code = 72350-2) NEGATIVE     NEGATIVE          

         





[facility] Zcvutmq4693-54-53 04:00:00* 



             Test Item    Value        Reference Range Interpretation Comments

 

             Urine Protein (test code = 5804-0) NEGATIVE     NEGATIVE           

        





[facility] Glucose (UA)2018 04:00:00* 



             Test Item    Value        Reference Range Interpretation Comments

 

             Urine Glucose (UA) (test code = 2349-9) NEGATIVE     NEGATIVE      

             





[facility] Qslgxhn6088-92-49 04:00:00* 



             Test Item    Value        Reference Range Interpretation Comments

 

             Urine Ketones (test code = 71445-7) NEGATIVE     NEGATIVE          

         





[facility] Wiibthuzknut5849-08-36 04:00:00* 



             Test Item    Value        Reference Range Interpretation Comments

 

             Urine Urobilinogen (test code = 36109-0) 1            0.2-1        

              





[facility] Iqwhbtzwg8254-62-86 04:00:00* 



             Test Item    Value        Reference Range Interpretation Comments

 

             Urine Bilirubin (test code = 1978-6) NEGATIVE     NEGATIVE         

          





[facility] Lygrn3465-83-28 04:00:00* 



             Test Item    Value        Reference Range Interpretation Comments

 

             Urine Blood (test code = 52866-5) TRACE        NEGATIVE     H      

       





[facility] Kfqdb5367-34-68 04:00:00* 



             Test Item    Value        Reference Range Interpretation Comments

 

             Urine Color (test code = 5778-6) YELLOW       YELLOW               

      





[facility] Puomcyy0985-17-06 04:00:00* 



             Test Item    Value        Reference Range Interpretation Comments

 

             Urine Clarity (test code = 14977-9) CLEAR        CLEAR             

         





[facility] Specific Vnxwglj3167-35-26 04:00:00
  * 



             Test Item    Value        Reference Range Interpretation Comments

 

             Urine Specific Gravity (test code = 5811-5) 1.015        1.010-1.02

5                





[facility] zY2468-11-45 04:00:00* 



             Test Item    Value        Reference Range Interpretation Comments

 

             Urine pH (test code = 25780-7) 8            5-7          H         

    





[facility] Leukocyte Gisdrkmb7552-62-18 
04:00:00* 



             Test Item    Value        Reference Range Interpretation Comments

 

             Urine Leukocyte Esterase (test code = 5799-2) TRACE        NEGATIVE

     H             





[facility] Scqrfyw5042-13-12 04:00:00* 



             Test Item    Value        Reference Range Interpretation Comments

 

             Urine Nitrite (test code = 98493-2) NEGATIVE     NEGATIVE          

         





[facility] Lekrwte9633-96-52 04:00:00* 



             Test Item    Value        Reference Range Interpretation Comments

 

             Urine Protein (test code = 5804-0) NEGATIVE     NEGATIVE           

        





[facility] Glucose (UA)2018 04:00:00* 



             Test Item    Value        Reference Range Interpretation Comments

 

             Urine Glucose (UA) (test code = 2349-9) NEGATIVE     NEGATIVE      

             





[facility] Lmptsyi4399-06-97 04:00:00* 



             Test Item    Value        Reference Range Interpretation Comments

 

             Urine Ketones (test code = 22041-8) NEGATIVE     NEGATIVE          

         





[facility] Uvulswiunobt8240-30-92 04:00:00* 



             Test Item    Value        Reference Range Interpretation Comments

 

             Urine Urobilinogen (test code = 77387-2) 1            0.2-1        

              





[facility] Bhgfjddqd3413-64-58 04:00:00* 



             Test Item    Value        Reference Range Interpretation Comments

 

             Urine Bilirubin (test code = 1978-6) NEGATIVE     NEGATIVE         

          





[facility] Mghjr4287-17-28 04:00:00* 



             Test Item    Value        Reference Range Interpretation Comments

 

             Urine Blood (test code = 34507-9) TRACE        NEGATIVE     H      

       





[facility] Lqqne3085-12-09 04:00:00* 



             Test Item    Value        Reference Range Interpretation Comments

 

             Urine Color (test code = 5778-6) YELLOW       YELLOW               

      





[facility] Qgouhds4639-17-71 04:00:00* 



             Test Item    Value        Reference Range Interpretation Comments

 

             Urine Clarity (test code = 58369-2) CLEAR        CLEAR             

         





[facility] Specific Dgwrlqz0217-08-50 04:00:00
  * 



             Test Item    Value        Reference Range Interpretation Comments

 

             Urine Specific Gravity (test code = 5811-5) 1.015        1.010-1.02

5                





[facility] hK5501-07-14 04:00:00* 



             Test Item    Value        Reference Range Interpretation Comments

 

             Urine pH (test code = 62884-5) 8            5-7          H         

    





[facility] Leukocyte Uwwfckgu8866-29-08 
04:00:00* 



             Test Item    Value        Reference Range Interpretation Comments

 

             Urine Leukocyte Esterase (test code = 5799-2) TRACE        NEGATIVE

     H             





[facility] Zuzaand4345-94-74 04:00:00* 



             Test Item    Value        Reference Range Interpretation Comments

 

             Urine Nitrite (test code = 27293-3) NEGATIVE     NEGATIVE          

         





[facility] Ivkexbw0384-12-55 04:00:00* 



             Test Item    Value        Reference Range Interpretation Comments

 

             Urine Protein (test code = 5804-0) NEGATIVE     NEGATIVE           

        





[facility] Glucose (UA)2018 04:00:00* 



             Test Item    Value        Reference Range Interpretation Comments

 

             Urine Glucose (UA) (test code = 2349-9) NEGATIVE     NEGATIVE      

             





[facility] Sogoips5249-64-29 04:00:00* 



             Test Item    Value        Reference Range Interpretation Comments

 

             Urine Ketones (test code = 74863-9) NEGATIVE     NEGATIVE          

         





[facility] Lvsnuytjxjli9589-77-77 04:00:00* 



             Test Item    Value        Reference Range Interpretation Comments

 

             Urine Urobilinogen (test code = 89940-5) 1            0.2-1        

              





[facility] Spbirtmfo1614-09-50 04:00:00* 



             Test Item    Value        Reference Range Interpretation Comments

 

             Urine Bilirubin (test code = 1978-6) NEGATIVE     NEGATIVE         

          





[facility] Uedzs4570-38-62 04:00:00* 



             Test Item    Value        Reference Range Interpretation Comments

 

             Urine Blood (test code = 26791-7) TRACE        NEGATIVE     H      

       





[facility] Kinase XA1039-66-03 16:21:00* 



             Test Item    Value        Reference Range Interpretation Comments

 

             Creatine Kinase MB (test code = 33637-9) 0.60         0-5.0        

              





[facility] -29-12 16:21:00* 



             Test Item    Value        Reference Range Interpretation Comments

 

             Troponin I (test code = UEG8472) -0.001       0-0.300              

      





[facility] Jkiibj4271-49-60 16:15:00* 



             Test Item    Value        Reference Range Interpretation Comments

 

             Creatine Kinase (test code = 2157-6) 52                      

          





[facility]isco[facility]thrombin Ofeu8814-36-46 16:13:00* 



             Test Item    Value        Reference Range Interpretation Comments

 

             Prothrombin Time (test code = 5902-2) 12.5         11.9-14.5       

           





[facility]iscoProthromb Time International Ratio
2018 16:13:00* 



             Test Item    Value        Reference Range Interpretation Comments

 

             Prothromb Time International Ratio (test code = 6301-6) 1.01       

                             





Oral Anticoagulant Therapy INR Values:1. Low Intensity Therapy        1.5 - 2.02
. Moderate Intensity Therapy   2.0 - 3.03. High Intensity Therapy(1)    2.5 - 3.
54. High Intensity Therapy(2)    3.0 - 4.05. Panic Value INR              > 5.0
[facility]Act[facility] Partial Thromboplast Time
2018 16:13:00* 



             Test Item    Value        Reference Range Interpretation Comments

 

             Activated Partial Thromboplast Time (test code = 30658-5) 28.3     

    23.8-35.5                  





[facility]iscoProthrombin Erxx3369-32-84 16:13:00* 



             Test Item    Value        Reference Range Interpretation Comments

 

             Prothrombin Time (test code = 5902-2) 12.5         11.9-14.5       

           





[facility]iscoProthromb Time International Ratio
2018 16:13:00* 



             Test Item    Value        Reference Range Interpretation Comments

 

             Prothromb Time International Ratio (test code = 6301-6) 1.01       

                             





Oral Anticoagulant Therapy INR Values:1. Low Intensity Therapy        1.5 - 2.02
. Moderate Intensity Therapy   2.0 - 3.03. High Intensity Therapy(1)    2.5 - 3.
54. High Intensity Therapy(2)    3.0 - 4.05. Panic Value INR              > 5.0
[facility]Act[facility] Partial Thromboplast Time
2018 16:13:00* 



             Test Item    Value        Reference Range Interpretation Comments

 

             Activated Partial Thromboplast Time (test code = 40578-4) 28.3     

    23.8-35.5                  





[facility] EYD0320-31-27 14:19:00* 



             Test Item    Value        Reference Range Interpretation Comments

 

             Urine WBC (test code = 5821-4) 6-10         0-5          H         

    





[facility] QYL1017-70-83 14:19:00* 



             Test Item    Value        Reference Range Interpretation Comments

 

             Urine RBC (test code = 16524-0) 0-5          0-5                   

     





[facility] Tsiunikr5712-37-84 14:19:00* 



             Test Item    Value        Reference Range Interpretation Comments

 

             Urine Bacteria (test code = 85761-0) FEW          NONE             

          





[facility] Epithelial Wuvwc7894-02-91 14:19:00
  * 



             Test Item    Value        Reference Range Interpretation Comments

 

             Urine Epithelial Cells (test code = 09384-7) FEW          NONE     

                  





[facility] Esjdx3151-75-07 14:01:00* 



             Test Item    Value        Reference Range Interpretation Comments

 

             Urine Color (test code = 5778-6) YELLOW       YELLOW               

      





[facility] Rhkekpa1214-65-74 14:01:00* 



             Test Item    Value        Reference Range Interpretation Comments

 

             Urine Clarity (test code = 03560-1) CLEAR        CLEAR             

         





[facility] Specific Vxmpixy6236-38-65 14:01:00
  * 



             Test Item    Value        Reference Range Interpretation Comments

 

             Urine Specific Gravity (test code = 5811-5) 1.010        1.010-1.02

5                





[facility] eB3467-65-70 14:01:00* 



             Test Item    Value        Reference Range Interpretation Comments

 

             Urine pH (test code = 83456-0) 7            5-7                    

    





[facility] Leukocyte Wdtbpljd7021-75-08 
14:01:00* 



             Test Item    Value        Reference Range Interpretation Comments

 

             Urine Leukocyte Esterase (test code = 5799-2) 1+           NEGATIVE

     H             





[facility] Mymnyht5326-82-78 14:01:00* 



             Test Item    Value        Reference Range Interpretation Comments

 

             Urine Nitrite (test code = 85154-1) NEGATIVE     NEGATIVE          

         





[facility] Qzuljrs1444-29-53 14:01:00* 



             Test Item    Value        Reference Range Interpretation Comments

 

             Urine Protein (test code = 5804-0) NEGATIVE     NEGATIVE           

        





[facility] Glucose (UA)2018 14:01:00* 



             Test Item    Value        Reference Range Interpretation Comments

 

             Urine Glucose (UA) (test code = 2349-9) NEGATIVE     NEGATIVE      

             





[facility] Hdlljnr9875-49-62 14:01:00* 



             Test Item    Value        Reference Range Interpretation Comments

 

             Urine Ketones (test code = 96178-7) NEGATIVE     NEGATIVE          

         





[facility] Zhwdqhcnizmv2913-45-48 14:01:00* 



             Test Item    Value        Reference Range Interpretation Comments

 

             Urine Urobilinogen (test code = 46851-6) 0.2          0.2-1        

              





[facility] Urdiixdkq8614-90-13 14:01:00* 



             Test Item    Value        Reference Range Interpretation Comments

 

             Urine Bilirubin (test code = 1978-6) NEGATIVE     NEGATIVE         

          





[facility] Pyfmd6375-44-46 14:01:00* 



             Test Item    Value        Reference Range Interpretation Comments

 

             Urine Blood (test code = 05981-3) 1+           NEGATIVE     H      

       





[facility]isco[facility] Piaax9081-36-65 14:01:00* 



             Test Item    Value        Reference Range Interpretation Comments

 

             Magnesium Level (test code = 68948-7) 1.8          1.3-2.1         

           





[facility]iscoAmylase Mewbr1059-16-16 14:01:00* 



             Test Item    Value        Reference Range Interpretation Comments

 

             Amylase Level (test code = 1798-8) 62                        

        





[facility]iscoLipase2018-03-29 14:01:00* 



             Test Item    Value        Reference Range Interpretation Comments

 

             Lipase (test code = 3040-3) 37           8-78                      

 





[facility]iscoMagnesium Mqkyg3028-75-19 14:01:00* 



             Test Item    Value        Reference Range Interpretation Comments

 

             Magnesium Level (test code = 54505-2) 1.8          1.3-2.1         

           





[facility] Sxjxq2678-79-25 13:55:00* 



             Test Item    Value        Reference Range Interpretation Comments

 

             Sodium Level (test code = 2951-2) 138          136-145             

       





[facility] Xvjeb3986-77-67 13:55:00* 



             Test Item    Value        Reference Range Interpretation Comments

 

             Potassium Level (test code = 2823-3) 4.2          3.5-5.1          

          





[facility]iscoChloride Awwjs1274-09-96 13:55:00* 



             Test Item    Value        Reference Range Interpretation Comments

 

             Chloride Level (test code = 2075-0) 105                      

         





[facility]iscoCarbon Dioxide Uguot8502-26-13 13:55:00* 



             Test Item    Value        Reference Range Interpretation Comments

 

             Carbon Dioxide Level (test code = 2028-9) 25           22-29       

               





[facility]iscoAnion Deo0665-81-92 13:55:00* 



             Test Item    Value        Reference Range Interpretation Comments

 

             Anion Gap (test code = 33037-3) 12.2         8-16                  

     





[facility]iscoBlood Urea Kqzupmzk4984-57-82 13:55:00* 



             Test Item    Value        Reference Range Interpretation Comments

 

             Blood Urea Nitrogen (test code = 3094-0) 9            7-26         

              





[facility]iscoCreatinine2018-03-29 13:55:00* 



             Test Item    Value        Reference Range Interpretation Comments

 

             Creatinine (test code = 2160-0) 0.68         0.57-1.11             

     





[facility]iscoBUN/Creatinine Vialw4953-81-42 13:55:00* 



             Test Item    Value        Reference Range Interpretation Comments

 

             BUN/Creatinine Ratio (test code = 3097-3) 13           6-25        

               





[facility]iscoEstimat Glomerular Filtration Rate
2018 13:55:00* 



             Test Item    Value        Reference Range Interpretation Comments

 

             Estimat Glomerular Filtration Rate (test code = 18192-3) 60-       

   >60                        





Ranges were taken from the National Kidney Disease Education Program and the [facility] literature.Reference ranges:60 or greater: [id number] (
for 3 consecutive months): Chronic kidney disease 15 or less: Kidney failureTexas Health FriscoGlucose Wcxal9583-01-56 13:55:00* 



             Test Item    Value        Reference Range Interpretation Comments

 

             Glucose Level (test code = PTF3304) 112                      

         





[facility]iscoCalcium Hvbpd2432-96-74 13:55:00* 



             Test Item    Value        Reference Range Interpretation Comments

 

             Calcium Level (test code = 72073-7) 9.1          8.4-10.2          

         





[facility]iscoTotal Cxgjesdoj0134-84-23 13:55:00* 



             Test Item    Value        Reference Range Interpretation Comments

 

             Total Bilirubin (test code = 1975-2) 0.5          0.2-1.2          

          





[facility] FriscoAspartate Amino Transf (AST/SGOT)
2018 13:55:00* 



             Test Item    Value        Reference Range Interpretation Comments

 

                                        Aspartate Amino Transf (AST/SGOT) (test 

code = Aspartate Amino Transf 

(AST/SGOT))     15              5-34                             





[facility] FriscoAlanine Aminotransferase (ALT/SGPT)
2018 13:55:00* 



             Test Item    Value        Reference Range Interpretation Comments

 

             Alanine Aminotransferase (ALT/SGPT) (test code = 1742-6) 6         

   0-55                       





[facility]iscoTotal Xzvuxdr8048-74-58 13:55:00* 



             Test Item    Value        Reference Range Interpretation Comments

 

             Total Protein (test code = 2885-2) 6.9          6.5-8.1            

        





[facility] FriscoAlbumin2018-03-29 13:55:00* 



             Test Item    Value        Reference Range Interpretation Comments

 

             Albumin (test code = 1751-7) 3.4          3.5-5.0      L           

  





[facility]iscoGlobulin2018-03-29 13:55:00* 



             Test Item    Value        Reference Range Interpretation Comments

 

             Globulin (test code = 90405-8) 3.5          2.3-3.5                

    





[facility] FriscoAlbumin/Globulin Rmnzt9441-54-53 13:55:00
  * 



             Test Item    Value        Reference Range Interpretation Comments

 

             Albumin/Globulin Ratio (test code = 1759-0) 1.0          0.8-2.0   

                 





[facility] Lioqbgtvppj0237-92-67 13:55:00* 



             Test Item    Value        Reference Range Interpretation Comments

 

             Alkaline Phosphatase (test code = 6768-6) 78                 

               





[facility] Blood Tvadl6078-53-01 13:32:00* 



             Test Item    Value        Reference Range Interpretation Comments

 

             White Blood Count (test code = 6690-2) 6.10         4.8-10.8       

            





[facility]iscoRed [facility] Vlntq4936-51-45 13:32:00* 



             Test Item    Value        Reference Range Interpretation Comments

 

             Red Blood Count (test code = 789-8) 3.87         3.6-5.1           

         





[facility]Hemoglobin2018-[facility] 13:32:00* 



             Test Item    Value        Reference Range Interpretation Comments

 

             Hemoglobin (test code = 79921-8) 10.6         12.0-16.0    L       

      





[facility]iscoHematocrit2018-03-29 13:32:00* 



             Test Item    Value        Reference Range Interpretation Comments

 

             Hematocrit (test code = 4544-3) 32.4         34.2-44.1    L        

     





[facility]iscoMean Corpuscular Nftrmj8257-33-03 
13:32:00* 



             Test Item    Value        Reference Range Interpretation Comments

 

             Mean Corpuscular Volume (test code = 787-2) 83.7         81-99     

                 





[facility]Mean Corpuscular Fhrjscjiif8106-41-60 
13:32:00* 



             Test Item    Value        Reference Range Interpretation Comments

 

             Mean Corpuscular Hemoglobin (test code = 785-6) 27.4         28-32 

       L             





[facility]iscoMean Corpuscular Hemoglobin Concent
2018 13:32:00* 



             Test Item    Value        Reference Range Interpretation Comments

 

             Mean Corpuscular Hemoglobin Concent (test code = 786-4) 32.7       

  31-35                      





[facility]iscoRed Cell Distribution Htprb6564-36-48 
13:32:00* 



             Test Item    Value        Reference Range Interpretation Comments

 

             Red Cell Distribution Width (test code = 58825-5) 14.4         11.7

-14.4                  





[facility]iscoPlatelet Pvpau3052-68-38 13:32:00* 



             Test Item    Value        Reference Range Interpretation Comments

 

             Platelet Count (test code = 777-3) 202          140-360            

        





[facility]iscoNeutrophils (%) (Auto)2018 13:32:00
  * 



             Test Item    Value        Reference Range Interpretation Comments

 

             Neutrophils (%) (Auto) (test code = 61275-9) 73.9         38.7-80.0

                  





[facility]iscoLymphocytes (%) (Auto)2018 13:32:00
  * 



             Test Item    Value        Reference Range Interpretation Comments

 

             Lymphocytes (%) (Auto) (test code = 736-9) 16.6         18.0-39.1  

  L             





[facility]iscoMonocytes (%) (Auto)2018 13:32:00* 



             Test Item    Value        Reference Range Interpretation Comments

 

             Monocytes (%) (Auto) (test code = 5905-5) 6.9          4.4-11.3    

               





[facility]iscoEosinophils (%) (Auto)2018 13:32:00
  * 



             Test Item    Value        Reference Range Interpretation Comments

 

             Eosinophils (%) (Auto) (test code = 713-8) 2.0          0.0-6.0    

                





[facility]iscoBasophils (%) (Auto)2018 13:32:00* 



             Test Item    Value        Reference Range Interpretation Comments

 

             Basophils (%) (Auto) (test code = 706-2) 0.3          0.0-1.0      

              





[facility]iscoIM GRANULOCYTES %2018 13:32:00* 



             Test Item    Value        Reference Range Interpretation Comments

 

             IM GRANULOCYTES % (test code = IM GRANULOCYTES %) 0.3          0.0-

1.0                    





[facility]iscoNeutrophils # (Auto)2018 13:32:00* 



             Test Item    Value        Reference Range Interpretation Comments

 

             Neutrophils # (Auto) (test code = 751-8) 4.5          2.1-6.9      

              





[facility]iscoLymphocytes # (Auto)2018 13:32:00* 



             Test Item    Value        Reference Range Interpretation Comments

 

             Lymphocytes # (Auto) (test code = 70922-2) 1.0          1.0-3.2    

                





[facility]iscoMonocytes # (Auto)2018 13:32:00* 



             Test Item    Value        Reference Range Interpretation Comments

 

             Monocytes # (Auto) (test code = 742-7) 0.4          0.2-0.8        

            





[facility]iscoEosinophils # (Auto)2018 13:32:00* 



             Test Item    Value        Reference Range Interpretation Comments

 

             Eosinophils # (Auto) (test code = 711-2) 0.1          0.0-0.4      

              





[facility]iscoBasophils # (Auto)2018 13:32:00* 



             Test Item    Value        Reference Range Interpretation Comments

 

             Basophils # (Auto) (test code = 704-7) 0.0          0.0-0.1        

            





[facility]iscoAbsolute Immature Granulocyte (auto
2018 13:32:00* 



             Test Item    Value        Reference Range Interpretation Comments

 

                                        Absolute Immature Granulocyte (auto (andrade

t code = Absolute Immature Granulocyte 

(auto)          0.02            0-0.1                            





[facility] Beyrvnh9303-15-57 11:47:00* 



             Test Item    Value        Reference Range Interpretation Comments

 

             Bedside Glucose (test code = 37477-6) 105                    

           





Meter ID: [id number] [facility] Glucose
2018 11:47:00* 



             Test Item    Value        Reference Range Interpretation Comments

 

             Bedside Glucose (test code = 44202-1) 105                    

           





Meter ID: [id number] [facility]
2018 11:47:00* 



             Test Item    Value        Reference Range Interpretation Comments

 

             Bedside Glucose (test code = 02123-5) 105                    

           





Meter ID: [id number] [facility] SoutheastClostridium Difficile
Toxin A & -21-72 20:22:00* 



             Test Item    Value        Reference Range Interpretation Comments

 

             Clostridium Difficile Toxin A & B (test code = 958820754) NEGATIVE 

    NEGATIVE                   





Testing on stool aspirate specimens is outside  claims since specime
n type not validated on this assay.[facility]
Clostridium Difficile Toxin A & -96-46 20:22:00* 



             Test Item    Value        Reference Range Interpretation Comments

 

             Clostridium Difficile Toxin A & B (test code = 162033379) NEGATIVE 

    NEGATIVE                   





Testing on stool aspirate specimens is outside  claims since specime
n type not validated on this assay.[facility]
Clostridium Difficile Toxin A & -66-66 20:22:00* 



             Test Item    Value        Reference Range Interpretation Comments

 

             Clostridium Difficile Toxin A & B (test code = 980000340) NEGATIVE 

    NEGATIVE                   





Testing on stool aspirate specimens is outside  claims since specime
n type not validated on this assay.[facility]
Erythrocyte Sedimentation Foxh3835-88-16 09:29:00* 



             Test Item    Value        Reference Range Interpretation Comments

 

             Erythrocyte Sedimentation Rate (test code = 4537-7) 8            0-

20                       





[facility]iscoErythrocyte Sedimentation Kqkj0451-17-72 
09:29:00* 



             Test Item    Value        Reference Range Interpretation Comments

 

             Erythrocyte Sedimentation Rate (test code = 4537-7) 8            0-

20                       





[facility]iscoErythrocyte Sedimentation Xfzg6685-77-68 
09:29:00* 



             Test Item    Value        Reference Range Interpretation Comments

 

             Erythrocyte Sedimentation Rate (test code = 4537-7) 8            0-

20                       





[facility]iscoHemoglobin A1c Ybesguv1838-10-50 07:35:00
  * 



             Test Item    Value        Reference Range Interpretation Comments

 

             Hemoglobin A1c Percent (test code = Hemoglobin A1c Percent) 5.0    

      4.0-7.0                    





[facility]iscoHemoglobin A1c Vxyqrgx6661-21-37 07:35:00
  * 



             Test Item    Value        Reference Range Interpretation Comments

 

             Hemoglobin A1c Percent (test code = Hemoglobin A1c Percent) 5.0    

      4.0-7.0                    





[facility]iscoHemoglobin A1c Dtwylzk8555-11-36 07:35:00
  * 



             Test Item    Value        Reference Range Interpretation Comments

 

             Hemoglobin A1c Percent (test code = Hemoglobin A1c Percent) 5.0    

      4.0-7.0                    





[facility] Nytfq6989-89-78 07:00:00* 



             Test Item    Value        Reference Range Interpretation Comments

 

             Sodium Level (test code = 2951-2) 135          136-145      L      

       





[facility]iscoPotassium Xdcmv8829-06-86 07:00:00* 



             Test Item    Value        Reference Range Interpretation Comments

 

             Potassium Level (test code = 2823-3) 4.2          3.5-5.1          

          





[facility]iscoChloride Equyx2359-03-15 07:00:00* 



             Test Item    Value        Reference Range Interpretation Comments

 

             Chloride Level (test code = 2075-0) 106                      

         





[facility] FriscoCarbon Dioxide Qauev0277-48-65 07:00:00* 



             Test Item    Value        Reference Range Interpretation Comments

 

             Carbon Dioxide Level (test code = 2028-9) 22           22-29       

               





[facility] FriscoAnion Fmi7165-66-00 07:00:00* 



             Test Item    Value        Reference Range Interpretation Comments

 

             Anion Gap (test code = 33037-3) 11.2         8-16                  

     





[facility]iscoBlood Urea Oshuvzvp4080-20-33 07:00:00* 



             Test Item    Value        Reference Range Interpretation Comments

 

             Blood Urea Nitrogen (test code = 3094-0) 8            7-26         

              





[facility]iscoCreatinine2018-02-19 07:00:00* 



             Test Item    Value        Reference Range Interpretation Comments

 

             Creatinine (test code = 2160-0) 0.71         0.57-1.11             

     





[facility]iscoBUN/Creatinine Lqzlv9906-27-79 07:00:00* 



             Test Item    Value        Reference Range Interpretation Comments

 

             BUN/Creatinine Ratio (test code = 3097-3) 11           6-25        

               





[facility] FriscoEstimat Glomerular Filtration Rate
2018 07:00:00* 



             Test Item    Value        Reference Range Interpretation Comments

 

             Estimat Glomerular Filtration Rate (test code = 16132-4) 60-       

   >60                        





Ranges were taken from the National Kidney Disease Education Program and the [employer] literature.Reference ranges:60 or greater: Pavpza57-05 (
for 3 consecutive months): Chronic kidney disease 15 or less: Kidney failureTexas Health FriscoGlucose Qxdjv6326-05-49 07:00:00* 



             Test Item    Value        Reference Range Interpretation Comments

 

             Glucose Level (test code = OII2300) 91                       

         





[facility] FriscoCalcium Dflfu2689-54-96 07:00:00* 



             Test Item    Value        Reference Range Interpretation Comments

 

             Calcium Level (test code = 86423-2) 8.4          8.4-10.2          

         





[facility]iscoTotal Bqcnkwhdp9056-31-38 07:00:00* 



             Test Item    Value        Reference Range Interpretation Comments

 

             Total Bilirubin (test code = 1975-2) 0.5          0.2-1.2          

          





[facility] FriscoAspartate Amino Transf (AST/SGOT)
2018 07:00:00* 



             Test Item    Value        Reference Range Interpretation Comments

 

                                        Aspartate Amino Transf (AST/SGOT) (test 

code = Aspartate Amino Transf 

(AST/SGOT))     18              5-34                             





[facility] FriscoAlanine Aminotransferase (ALT/SGPT)
2018 07:00:00* 



             Test Item    Value        Reference Range Interpretation Comments

 

             Alanine Aminotransferase (ALT/SGPT) (test code = 1742-6) 8         

   0-55                       





[facility]iscoTotal Ozmoiza5799-18-58 07:00:00* 



             Test Item    Value        Reference Range Interpretation Comments

 

             Total Protein (test code = 2885-2) 6.6          6.5-8.1            

        





[facility] FriscoAlbumin2018-02-19 07:00:00* 



             Test Item    Value        Reference Range Interpretation Comments

 

             Albumin (test code = 1751-7) 3.4          3.5-5.0      L           

  





[facility]iscoGlobulin2018-02-19 07:00:00* 



             Test Item    Value        Reference Range Interpretation Comments

 

             Globulin (test code = 64876-0) 3.2          2.3-3.5                

    





[facility] FriscoAlbumin/Globulin Qwetl3155-61-86 07:00:00
  * 



             Test Item    Value        Reference Range Interpretation Comments

 

             Albumin/Globulin Ratio (test code = 1759-0) 1.1          0.8-2.0   

                 





[facility] FriscoAlkaline Vvpxdgtvotl8562-24-14 07:00:00* 



             Test Item    Value        Reference Range Interpretation Comments

 

             Alkaline Phosphatase (test code = 6768-6) 68                 

               





[facility]iscoWhite Blood Iormo2906-49-48 06:25:00* 



             Test Item    Value        Reference Range Interpretation Comments

 

             White Blood Count (test code = 6690-2) 3.66         4.8-10.8     L 

            





[facility]iscoRed Blood Clqxj1125-75-18 06:25:00* 



             Test Item    Value        Reference Range Interpretation Comments

 

             Red Blood Count (test code = 789-8) 4.06         3.6-5.1           

         





[facility]iscoHemoglobin2018-02-19 06:25:00* 



             Test Item    Value        Reference Range Interpretation Comments

 

             Hemoglobin (test code = 98730-2) 10.7         12.0-16.0    L       

      





[facility]iscoHematocrit2018-02-19 06:25:00* 



             Test Item    Value        Reference Range Interpretation Comments

 

             Hematocrit (test code = 4544-3) 34.9         34.2-44.1             

     





[facility]iscoMean Corpuscular Bjzdkf8845-97-23 
06:25:00* 



             Test Item    Value        Reference Range Interpretation Comments

 

             Mean Corpuscular Volume (test code = 787-2) 86.0         81-99     

                 





[facility]iscoMean Corpuscular Ojxmfvnlfo7159-78-91 
06:25:00* 



             Test Item    Value        Reference Range Interpretation Comments

 

             Mean Corpuscular Hemoglobin (test code = 785-6) 26.4         28-32 

       L             





[facility]
2018 06:25:00* 



             Test Item    Value        Reference Range Interpretation Comments

 

             Mean Corpuscular Hemoglobin Concent (test code = 786-4) 30.7       

  31-35        L             





[facility]iscoRed Cell Distribution Axlof7090-96-10 
06:25:00* 



             Test Item    Value        Reference Range Interpretation Comments

 

             Red Cell Distribution Width (test code = 75818-5) 15.3         11.7

-14.4    H             





[facility]iscoPlatelet Yaurp3979-40-08 06:25:00* 



             Test Item    Value        Reference Range Interpretation Comments

 

             Platelet Count (test code = 777-3) 167          140-360            

        





[facility]iscoNeutrophils (%) (Auto)2018 06:25:00
  * 



             Test Item    Value        Reference Range Interpretation Comments

 

             Neutrophils (%) (Auto) (test code = 80808-3) 52.3         38.7-80.0

                  





[facility]iscoLymphocytes (%) (Auto)2018 06:25:00
  * 



             Test Item    Value        Reference Range Interpretation Comments

 

             Lymphocytes (%) (Auto) (test code = 736-9) 33.3         18.0-39.1  

                





[facility]iscoMonocytes (%) (Auto)2018 06:25:00* 



             Test Item    Value        Reference Range Interpretation Comments

 

             Monocytes (%) (Auto) (test code = 5905-5) 11.7         4.4-11.3    

 H             





[facility]iscoEosinophils (%) (Auto)2018 06:25:00
  * 



             Test Item    Value        Reference Range Interpretation Comments

 

             Eosinophils (%) (Auto) (test code = 713-8) 1.6          0.0-6.0    

                





[facility]iscoBasophils (%) (Auto)2018 06:25:00* 



             Test Item    Value        Reference Range Interpretation Comments

 

             Basophils (%) (Auto) (test code = 706-2) 0.8          0.0-1.0      

              





[facility]iscoIM GRANULOCYTES %2018 06:25:00* 



             Test Item    Value        Reference Range Interpretation Comments

 

             IM GRANULOCYTES % (test code = IM GRANULOCYTES %) 0.3          0.0-

1.0                    





[facility]iscoNeutrophils # (Auto)2018 06:25:00* 



             Test Item    Value        Reference Range Interpretation Comments

 

             Neutrophils # (Auto) (test code = 751-8) 1.9          2.1-6.9      

L             





[facility]iscoLymphocytes # (Auto)2018 06:25:00* 



             Test Item    Value        Reference Range Interpretation Comments

 

             Lymphocytes # (Auto) (test code = 70843-3) 1.2          1.0-3.2    

                





[facility]iscoMonocytes # (Auto)2018 06:25:00* 



             Test Item    Value        Reference Range Interpretation Comments

 

             Monocytes # (Auto) (test code = 742-7) 0.4          0.2-0.8        

            





[facility]iscoEosinophils # (Auto)2018 06:25:00* 



             Test Item    Value        Reference Range Interpretation Comments

 

             Eosinophils # (Auto) (test code = 711-2) 0.1          0.0-0.4      

              





[facility]iscoBasophils # (Auto)2018 06:25:00* 



             Test Item    Value        Reference Range Interpretation Comments

 

             Basophils # (Auto) (test code = 704-7) 0.0          0.0-0.1        

            





[facility] FriscoAbsolute Immature Granulocyte (auto
2018 06:25:00* 



             Test Item    Value        Reference Range Interpretation Comments

 

                                        Absolute Immature Granulocyte (auto (andrade

t code = Absolute Immature Granulocyte 

(auto)          0.01            0-0.1                            





[facility]iscoUrine WMR3220-40-97 21:43:00* 



             Test Item    Value        Reference Range Interpretation Comments

 

             Urine WBC (test code = 5821-4) 0-5          0-5                    

    





[facility]iscoUrine ZES9174-16-73 21:43:00* 



             Test Item    Value        Reference Range Interpretation Comments

 

             Urine RBC (test code = 21110-1) 0-5          0-5                   

     





[facility]iscoUrine Afunydpu3710-64-51 21:43:00* 



             Test Item    Value        Reference Range Interpretation Comments

 

             Urine Bacteria (test code = 67136-8) MODERATE     NONE         H   

          





[facility]iscoUrine Epithelial Wyczf1963-43-28 21:43:00
  * 



             Test Item    Value        Reference Range Interpretation Comments

 

             Urine Epithelial Cells (test code = 58628-4) RARE         NONE     

                  





[facility]iscoUrine Wcntc2560-75-90 21:36:00* 



             Test Item    Value        Reference Range Interpretation Comments

 

             Urine Color (test code = 5778-6) YELLOW       YELLOW               

      





[facility]iscoUrine Eddlxyk6906-60-17 21:36:00* 



             Test Item    Value        Reference Range Interpretation Comments

 

             Urine Clarity (test code = 93012-1) CLEAR        CLEAR             

         





[facility]iscoUrine Specific Rrginoi7980-17-99 21:36:00
  * 



             Test Item    Value        Reference Range Interpretation Comments

 

             Urine Specific Gravity (test code = 5811-5) 1.015        1.010-1.02

5                





[facility]iscoUrine zP7901-87-26 21:36:00* 



             Test Item    Value        Reference Range Interpretation Comments

 

             Urine pH (test code = 47169-3) 6            5-7                    

    





[facility]iscoUrine Leukocyte Pvmqrnsw0802-87-32 
21:36:00* 



             Test Item    Value        Reference Range Interpretation Comments

 

             Urine Leukocyte Esterase (test code = 5799-2) NEGATIVE     NEGATIVE

                   





[facility]iscoUrine Khbvmog8612-94-31 21:36:00* 



             Test Item    Value        Reference Range Interpretation Comments

 

             Urine Nitrite (test code = 22403-5) NEGATIVE     NEGATIVE          

         





[facility]iscoUrine Zzgkamq8100-02-79 21:36:00* 



             Test Item    Value        Reference Range Interpretation Comments

 

             Urine Protein (test code = 5804-0) NEGATIVE     NEGATIVE           

        





[facility]iscoUrine Glucose (UA)2018 21:36:00* 



             Test Item    Value        Reference Range Interpretation Comments

 

             Urine Glucose (UA) (test code = 2349-9) NEGATIVE     NEGATIVE      

             





[facility]iscoUrine Bdedjrf0563-12-16 21:36:00* 



             Test Item    Value        Reference Range Interpretation Comments

 

             Urine Ketones (test code = 27084-0) NEGATIVE     NEGATIVE          

         





[facility]iscoUrine Trmccuvjubgw4393-60-82 21:36:00* 



             Test Item    Value        Reference Range Interpretation Comments

 

             Urine Urobilinogen (test code = 84998-6) 0.2          0.2-1        

              





[facility]iscoUrine Uzbdcmsjb8787-33-27 21:36:00* 



             Test Item    Value        Reference Range Interpretation Comments

 

             Urine Bilirubin (test code = 1978-6) NEGATIVE     NEGATIVE         

          





[facility]iscoUrine Ocucs6421-50-81 21:36:00* 



             Test Item    Value        Reference Range Interpretation Comments

 

             Urine Blood (test code = 82307-6) TRACE        NEGATIVE     H      

       





[facility]iscoCreatine Pyzjsn7495-95-81 18:47:00* 



             Test Item    Value        Reference Range Interpretation Comments

 

             Creatine Kinase (test code = 2157-6) 87                      

          





[facility]iscoCreatine Kinase ZJ1235-25-08 18:47:00* 



             Test Item    Value        Reference Range Interpretation Comments

 

             Creatine Kinase MB (test code = 08624-8) 1.00         0-5.0        

              





[facility]iscoTroponin -53-91 18:47:00* 



             Test Item    Value        Reference Range Interpretation Comments

 

             Troponin I (test code = BDX3472) -0.00        0.0-0.78     L       

      





[facility] [place] [address]      Patient Name: JOAN RUIZ   MR #: [id number]    : 1955 
Age/Sex: 63/F  Acct #: [id number] Req #: [id number]  Adm Physician:     
Ordered by: CARY NESS MD  Report #: [id number]   Location: ER  Room/B
ed:     ________________________________________________________________________
___________________________    Procedure: [id number] CT/CT ABDOMEN/PELVIS MARILYN toth Date: 16                            Exam Time: 0500       REPORT STATUS:
Signed    EXAM: CT ABDOMEN AND PELVIS without IV CONTRAST   INDICATION:  Abdomi
nal pain, nausea and vomiting   COMPARISON:  CT of the abdomen and pelvis 2018   TECHNIQUE: The abdomen and pelvis were scanned using a multidetect
or helical   scanner. Coronal and sagittal reformations were obtained. Routine p
rotocol   performed.   IV Contrast: None   Oral Contrast: Gastrografin   CTDIvol
has been reviewed. It is below the limits set by the Radiation Protocol   Oliver guzmán ([facility]).      FINDINGS:   LOWER THORAX: No consolidations      LIVER: No mass
es   BILIARY: Cholecystectomy. No abnormal ductal dilation.       SPLEEN: No mas
ses   PANCREAS: No masses      ADRENALS: No nodules      RIGHT KIDNEY: No nephro
ureterolithiasis or hydronephrosis.       LEFT KIDNEY: No nephroureterolithiasis
or hydronephrosis.      GI TRACT: No wall thickening or obstruction. Surgical c
hanges of gastric   bypass. Surgical sutures around the sigmoid colon. Normal ap
pendix.      VESSELS:  Mild atherosclerotic changes of the abdominal aorta witho
ut aneurysm.   PERITONEUM/RETROPERITONEUM: No free air or fluid   LYMPH NODES: N
o lymphadenopathy      REPRODUCTIVE ORGANS: Not visualized   BLADDER: Normal    
 SOFT TISSUES: Normal   BONES: No suspicious bone lesions.      IMPRESSION:   No
acute findings.   No bowel obstruction.      Signed by: Dr. Mat Nugent M.D. on 2018 5:20 AM        Dictated By: MAT NUGENT MD  Electronically 
Signed By: MAT NUGENT MD on 18 0520  Transcribed By: KACEY on 
8 0520       COPY TO:   CARY NESS MD        CT ABDOMEN/PELVIS W    [place] [address]      Patient Name: JOAN RUIZ   MR #: [id number]    : 1955 
Age/Sex: 62/F  Acct #: [id number] Req #: [id number]  Adm Physician:     
Ordered by: CHRISTIAN UGARTE MD  Report #: [id number]   Location: ER  Room/Bed:
    ___________________________________________________________________________
________________________    Procedure: [id number] CT/CT ABDOMEN/PELVIS W  Exam Da
te: 18                            Exam Time:        REPORT STATUS: Sig
donnie    EXAM: CT ABDOMEN/PELVIS W   DATE: 2018 6:05 PM     INDICATION:  Abdo
gary pain   COMPARISON:  2016   TECHNIQUE: The abdomen and pelvis were sca
nned using a multidetector helical   scanner. Coronal and sagittal reformations 
were obtained. Routine protocol   performed.   IV Contrast: 100 ml Isovue 370   
  FINDINGS:   LOWER THORAX: Mild bibasilar atelectasis.      LIVER/BILIARY: Sta
ble low-density right liver lesion, likely benign. Unchanged   mild biliary duct
al dilation.      GALLBLADDER: Cholecystectomy.   SPLEEN: Unremarkable   PANCREA
S: Unremarkable      ADRENALS: No nodules   KIDNEYS: Symmetric perfusion. Stable
subcentimeter probable left inferior renal   cyst. No hydronephrosis.      GI T
RACT: Postsurgical changes are seen status post gastric bypass and prior   sigmo
idectomy. No evidence of obstruction or wall thickening. Normal appendix.      V
ESSELS: Mild to moderate atherosclerotic changes.   PERITONEUM/RETROPERITONEUM: 
No free air or fluid   LYMPH NODES: No lymphadenopathy      REPRODUCTIVE ORGANS/
BLADDER: Hysterectomy. Bladder is mildly distended but   otherwise unremarkable.
     SOFT TISSUES: Unremarkable   BONES: There are scattered degenerative villafuerte
es, worse at L5-S1.      IMPRESSION:   No acute abnormality.          Signed by:
Dr Daysi Stroud MD on 2018 8:43 PM        Dictated By: DAYSI STROUD MD
 Electronically Signed By: DAYSI STROUD MD on 18  Transcribed By: 
KACEY on 18       COPY TO:   CHRISTIAN UGARTE MD

## 2020-11-25 NOTE — XMS REPORT
Clinical Summary

                             Created on: 2020



Renetta Funez

External Reference #: [id number]

: 1955

Sex: Female



Demographics





                          Address                   [address] [place] [place]  [address]

 

                          Home Phone                [phone]

 

                          Preferred Language        English

 

                          Marital Status            

 

                          Yazidi Affiliation     Unknown

 

                          Race                      White

 

                          Ethnic Group              /Latin





Author





                          Author                    [facility]

 

                          Organization              [facility]

 

                          Address                   Unknown

 

                          Phone                     Unavailable







Support





                Name            Relationship    Address         Phone

 

                Humza Patrick ECON            Unknown         [phone]







Care Team Providers





                    Care Team Member Name Role                Phone

 

                    Asked, No Pcp       PCP                 Unavailable







Allergies





                                        Comments



                 Active Allergy  Reactions       Severity        Noted Date 

 

                                        



Blisters



                     Morphine            Hives               2018 







Medications





                          End Date                  Status



              Medication   Sig          Dispensed    Refills      Start  



                                         Date  

 

                                                    Active



                     clonAZEPAM (KlonoPIN) 1  Take 1 mg by        0   



                           MG tablet                 mouth every 8     



                                         (eight) hours     



                                         as needed for     



                                         anxiety.     

 

                                                    Active



                     clonAZEPAM (KlonoPIN) 2  Take 2 mg by        0   



                           MG tablet                 mouth every 8     



                                         (eight) hours     



                                         as needed for     



                                         anxiety.     

 

                                                    Active



                     diclofenac (VOLTAREN) 1 %  Apply 1             0   



                           gel                       application     



                                         topically     



                                         every 6 (six)     



                                         hours as     



                                         needed     



                                         (pain).     

 

                                                    Active



                     HYDROcodone-acetaminophen  Take 1 tablet       0   



                           (NORCO)  mg per     by mouth     



                           tablet                    every 8     



                                         (eight) hours     



                                         as needed for     



                                         moderate     



                                         pain.     

 

                                                    Active



                     promethazine-codeine  Take 5 mL by        0   



                           (PHENERGAN with CODEINE)  mouth every 6     



                           6.25-10 mg/5 mL syrup     (six) hours     



                                         as needed for     



                                         cough.     

 

                                                    Active



                     zolpidem (AMBIEN) 10 mg  Take 10 mg by       0   



                           tablet                    mouth nightly     



                                         as needed for     



                                         sleep.     

 

                                                    Active



                     cholecalciferol, vitamin  Take 1,000          0   



                           D3, (VITAMIN D3) 1,000    Units by     



                           unit tablet               mouth daily.     

 

                                                    Active



                     ascorbic acid, vitamin C,  Take 500 mg         0   



                           (VITAMIN C) 500 MG tablet  by mouth     



                                         daily.     

 

                                                    Active



                     TURMERIC ROOT EXTRACT  Take 1 tablet       0   



                           ORAL                      by mouth     



                                         daily.     







Active Problems





 



                           Problem                   Noted Date

 

 



                           Pneumonia of right middle lobe due to infectious orga

nism  2018

 

 



                           Atypical pneumonia        2018







Surgical History





   



                 Surgery         Date            Site/Laterality  Comments

 

   



                                         BOWEL RESECTION   

 

   



                           GASTRIC BYPASS            2001 -  



                                         2001  

 

   



                                         CARPAL TUNNEL RELEASE   







Medical History





  



                     Medical History     Date                Comments

 

  



                                         Diverticulitis  







Social History





                                        Date



                 Tobacco Use     Types           Packs/Day       Years Used 

 

                                         



                                         Never Smoker    

 

    



                                         Smokeless Tobacco: Never   



                                         Used   







                    Drinks/Week         oz/Week             Comments



                                         Alcohol Use   

 

                                                             



                                         No   







 



                           Sex Assigned at Birth     Date Recorded

 

 



                                         Not on file 







Last Filed Vital Signs

Not on file



Plan of Treatment





   



                 Health Maintenance  Due Date        Last Done       Comments

 

   



                           BREAST CANCER SCREENING   2003  

 

   



                           COLONOSCOPY SCREENING     2003  

 

   



                           SHINGLES VACCINES (#1)    2003  

 

   



                           65+ PNEUMOCOCCAL VACCINE  2018  



                                         (1 of 1 - PPSV23)   

 

   



                           INFLUENZA VACCINE         2020  







Results

Not on fileafter 2019



Insurance





                                        Type



            Payer      Benefit    Subscriber ID  Effective  Phone      Address 



                           Plan /                    Dates   



                                         Group     

 

                                        HMO



                 [employer]  [employer]           [id number]       2018-P 

  



                           [employer]PR[employer]                resent   



                                         NG O MCR     



                                         ADV     







     



            Guarantor Name  Account    Relation to  Date of    Phone      Billin

g Address



                     Type                Patient             Birth  

 

     



            Renetta Funez  Personal/F  Self       1955  [phone]  [address]



                     [facility]               (Home)              [address]



                                         [place] [address]







Advance Directives





For more information, please contact: [phone]







                          Patient Representative    Explanation



                           Type                      Date Recorded  

 

                                                     



                                         Advance Directives,   



                                         Living Will and   



                                         Medical Power of   



                                            











                          Date Inactivated          Comments



                           Code Status               Date Activated  

 

                          3/6/2018  1:55 PM          



                           Full Code                 3/6/2018  7:39 AM  







  



                           Code Status decision reached by:  Patient

## 2020-11-28 ENCOUNTER — HOSPITAL ENCOUNTER (EMERGENCY)
Dept: HOSPITAL 88 - ER | Age: 65
Discharge: HOME | End: 2020-11-28
Payer: MEDICARE

## 2020-11-28 VITALS — HEIGHT: 64 IN | WEIGHT: 180 LBS | BODY MASS INDEX: 30.73 KG/M2

## 2020-11-28 DIAGNOSIS — R10.30: ICD-10-CM

## 2020-11-28 DIAGNOSIS — M54.5: ICD-10-CM

## 2020-11-28 DIAGNOSIS — K21.9: ICD-10-CM

## 2020-11-28 DIAGNOSIS — K29.70: Primary | ICD-10-CM

## 2020-11-28 LAB
ALBUMIN SERPL-MCNC: 3.5 G/DL (ref 3.5–5)
ALBUMIN/GLOB SERPL: 1.1 {RATIO} (ref 0.8–2)
ALP SERPL-CCNC: 88 IU/L (ref 40–150)
ALT SERPL-CCNC: 14 IU/L (ref 0–55)
ANION GAP SERPL CALC-SCNC: 12.1 MMOL/L (ref 8–16)
BASOPHILS # BLD AUTO: 0 10*3/UL (ref 0–0.1)
BASOPHILS NFR BLD AUTO: 0.5 % (ref 0–1)
BUN SERPL-MCNC: 29 MG/DL (ref 7–26)
BUN/CREAT SERPL: 28 (ref 6–25)
CALCIUM SERPL-MCNC: 8.6 MG/DL (ref 8.4–10.2)
CHLORIDE SERPL-SCNC: 97 MMOL/L (ref 98–107)
CK MB SERPL-MCNC: 2.7 NG/ML (ref 0–5)
CK SERPL-CCNC: 56 IU/L (ref 29–168)
CO2 SERPL-SCNC: 28 MMOL/L (ref 22–29)
DEPRECATED NEUTROPHILS # BLD AUTO: 5.4 10*3/UL (ref 2.1–6.9)
EGFRCR SERPLBLD CKD-EPI 2021: 53 ML/MIN (ref 60–?)
EOSINOPHIL # BLD AUTO: 0 10*3/UL (ref 0–0.4)
EOSINOPHIL NFR BLD AUTO: 0.1 % (ref 0–6)
ERYTHROCYTE [DISTWIDTH] IN CORD BLOOD: 15 % (ref 11.7–14.4)
GLOBULIN PLAS-MCNC: 3.1 G/DL (ref 2.3–3.5)
GLUCOSE SERPLBLD-MCNC: 111 MG/DL (ref 74–118)
HCT VFR BLD AUTO: 39.7 % (ref 34.2–44.1)
HGB BLD-MCNC: 12.5 G/DL (ref 12–16)
LIPASE SERPL-CCNC: 78 U/L (ref 8–78)
LYMPHOCYTES # BLD: 1.4 10*3/UL (ref 1–3.2)
LYMPHOCYTES NFR BLD AUTO: 17.8 % (ref 18–39.1)
MCH RBC QN AUTO: 25.6 PG (ref 28–32)
MCHC RBC AUTO-ENTMCNC: 31.5 G/DL (ref 31–35)
MCV RBC AUTO: 81.4 FL (ref 81–99)
MONOCYTES # BLD AUTO: 0.8 10*3/UL (ref 0.2–0.8)
MONOCYTES NFR BLD AUTO: 10.5 % (ref 4.4–11.3)
NEUTS SEG NFR BLD AUTO: 70.7 % (ref 38.7–80)
PLATELET # BLD AUTO: 271 X10E3/UL (ref 140–360)
POTASSIUM SERPL-SCNC: 3.1 MMOL/L (ref 3.5–5.1)
RBC # BLD AUTO: 4.88 X10E6/UL (ref 3.6–5.1)
SODIUM SERPL-SCNC: 134 MMOL/L (ref 136–145)

## 2020-11-28 PROCEDURE — 85025 COMPLETE CBC W/AUTO DIFF WBC: CPT

## 2020-11-28 PROCEDURE — 82553 CREATINE MB FRACTION: CPT

## 2020-11-28 PROCEDURE — 80053 COMPREHEN METABOLIC PANEL: CPT

## 2020-11-28 PROCEDURE — 83690 ASSAY OF LIPASE: CPT

## 2020-11-28 PROCEDURE — 36415 COLL VENOUS BLD VENIPUNCTURE: CPT

## 2020-11-28 PROCEDURE — 84484 ASSAY OF TROPONIN QUANT: CPT

## 2020-11-28 PROCEDURE — 99283 EMERGENCY DEPT VISIT LOW MDM: CPT

## 2020-11-28 PROCEDURE — 82550 ASSAY OF CK (CPK): CPT

## 2021-07-19 ENCOUNTER — HOSPITAL ENCOUNTER (INPATIENT)
Dept: HOSPITAL 88 - ER | Age: 66
LOS: 2 days | Discharge: HOME | DRG: 313 | End: 2021-07-21
Attending: INTERNAL MEDICINE | Admitting: INTERNAL MEDICINE
Payer: MEDICARE

## 2021-07-19 VITALS — DIASTOLIC BLOOD PRESSURE: 90 MMHG | SYSTOLIC BLOOD PRESSURE: 123 MMHG

## 2021-07-19 VITALS — SYSTOLIC BLOOD PRESSURE: 123 MMHG | DIASTOLIC BLOOD PRESSURE: 90 MMHG

## 2021-07-19 VITALS — WEIGHT: 164.19 LBS | HEIGHT: 64 IN | BODY MASS INDEX: 28.03 KG/M2

## 2021-07-19 DIAGNOSIS — F41.9: ICD-10-CM

## 2021-07-19 DIAGNOSIS — Z98.84: ICD-10-CM

## 2021-07-19 DIAGNOSIS — I10: ICD-10-CM

## 2021-07-19 DIAGNOSIS — R07.89: Primary | ICD-10-CM

## 2021-07-19 DIAGNOSIS — M54.9: ICD-10-CM

## 2021-07-19 DIAGNOSIS — E78.5: ICD-10-CM

## 2021-07-19 DIAGNOSIS — K21.9: ICD-10-CM

## 2021-07-19 DIAGNOSIS — E66.3: ICD-10-CM

## 2021-07-19 DIAGNOSIS — Z20.822: ICD-10-CM

## 2021-07-19 LAB
ALBUMIN SERPL-MCNC: 3.8 G/DL (ref 3.5–5)
ALBUMIN/GLOB SERPL: 1.2 {RATIO} (ref 0.8–2)
ALP SERPL-CCNC: 108 IU/L (ref 40–150)
ALT SERPL-CCNC: 11 IU/L (ref 0–55)
ANION GAP SERPL CALC-SCNC: 14 MMOL/L (ref 8–16)
BACTERIA URNS QL MICRO: (no result) /HPF
BASOPHILS # BLD AUTO: 0 10*3/UL (ref 0–0.1)
BASOPHILS NFR BLD AUTO: 0.6 % (ref 0–1)
BUN SERPL-MCNC: 12 MG/DL (ref 7–26)
BUN/CREAT SERPL: 18 (ref 6–25)
CALCIUM SERPL-MCNC: 9.1 MG/DL (ref 8.4–10.2)
CHLORIDE SERPL-SCNC: 101 MMOL/L (ref 98–107)
CK MB SERPL-MCNC: 0.8 NG/ML (ref 0–5)
CK MB SERPL-MCNC: 0.8 NG/ML (ref 0–5)
CK SERPL-CCNC: 61 IU/L (ref 29–168)
CK SERPL-CCNC: 62 IU/L (ref 29–168)
CLARITY UR: CLEAR
CO2 SERPL-SCNC: 25 MMOL/L (ref 22–29)
COLOR UR: YELLOW
DEPRECATED APTT PLAS QN: 31.5 SECONDS (ref 23.8–35.5)
DEPRECATED INR PLAS: 0.92
DEPRECATED NEUTROPHILS # BLD AUTO: 1.9 10*3/UL (ref 2.1–6.9)
DEPRECATED RBC URNS MANUAL-ACNC: (no result) /HPF (ref 0–5)
EGFRCR SERPLBLD CKD-EPI 2021: 86 ML/MIN (ref 60–?)
EOSINOPHIL # BLD AUTO: 0 10*3/UL (ref 0–0.4)
EOSINOPHIL NFR BLD AUTO: 1.1 % (ref 0–6)
EPI CELLS URNS QL MICRO: (no result) /LPF
ERYTHROCYTE [DISTWIDTH] IN CORD BLOOD: 15.5 % (ref 11.7–14.4)
GLOBULIN PLAS-MCNC: 3.2 G/DL (ref 2.3–3.5)
GLUCOSE SERPLBLD-MCNC: 67 MG/DL (ref 74–118)
HCT VFR BLD AUTO: 35 % (ref 34.2–44.1)
HGB BLD-MCNC: 10.9 G/DL (ref 12–16)
KETONES UR QL STRIP.AUTO: NEGATIVE
LEUKOCYTE ESTERASE UR QL STRIP.AUTO: (no result)
LYMPHOCYTES # BLD: 1.1 10*3/UL (ref 1–3.2)
LYMPHOCYTES NFR BLD AUTO: 30.7 % (ref 18–39.1)
MCH RBC QN AUTO: 26.5 PG (ref 28–32)
MCHC RBC AUTO-ENTMCNC: 31.1 G/DL (ref 31–35)
MCV RBC AUTO: 85.2 FL (ref 81–99)
MONOCYTES # BLD AUTO: 0.5 10*3/UL (ref 0.2–0.8)
MONOCYTES NFR BLD AUTO: 13.7 % (ref 4.4–11.3)
MUCOUS THREADS URNS QL MICRO: (no result)
NEUTS SEG NFR BLD AUTO: 53.6 % (ref 38.7–80)
NITRITE UR QL STRIP.AUTO: NEGATIVE
NON-SQ EPI CELLS URNS QL MICRO: (no result)
PLATELET # BLD AUTO: 240 X10E3/UL (ref 140–360)
POTASSIUM SERPL-SCNC: 4 MMOL/L (ref 3.5–5.1)
PROT UR QL STRIP.AUTO: NEGATIVE
PROTHROMBIN TIME: 12.9 SECONDS (ref 11.9–14.5)
RBC # BLD AUTO: 4.11 X10E6/UL (ref 3.6–5.1)
SODIUM SERPL-SCNC: 136 MMOL/L (ref 136–145)
SP GR UR STRIP: 1.02 (ref 1.01–1.02)
UROBILINOGEN UR STRIP-MCNC: 1 MG/DL (ref 0.2–1)
WBC #/AREA URNS HPF: (no result) /HPF (ref 0–5)

## 2021-07-19 PROCEDURE — 83880 ASSAY OF NATRIURETIC PEPTIDE: CPT

## 2021-07-19 PROCEDURE — 83036 HEMOGLOBIN GLYCOSYLATED A1C: CPT

## 2021-07-19 PROCEDURE — 36415 COLL VENOUS BLD VENIPUNCTURE: CPT

## 2021-07-19 PROCEDURE — 80053 COMPREHEN METABOLIC PANEL: CPT

## 2021-07-19 PROCEDURE — 85730 THROMBOPLASTIN TIME PARTIAL: CPT

## 2021-07-19 PROCEDURE — 82550 ASSAY OF CK (CPK): CPT

## 2021-07-19 PROCEDURE — 93017 CV STRESS TEST TRACING ONLY: CPT

## 2021-07-19 PROCEDURE — 99284 EMERGENCY DEPT VISIT MOD MDM: CPT

## 2021-07-19 PROCEDURE — 85610 PROTHROMBIN TIME: CPT

## 2021-07-19 PROCEDURE — 82553 CREATINE MB FRACTION: CPT

## 2021-07-19 PROCEDURE — 71045 X-RAY EXAM CHEST 1 VIEW: CPT

## 2021-07-19 PROCEDURE — 85025 COMPLETE CBC W/AUTO DIFF WBC: CPT

## 2021-07-19 PROCEDURE — 82948 REAGENT STRIP/BLOOD GLUCOSE: CPT

## 2021-07-19 PROCEDURE — 80061 LIPID PANEL: CPT

## 2021-07-19 PROCEDURE — 84484 ASSAY OF TROPONIN QUANT: CPT

## 2021-07-19 PROCEDURE — 93005 ELECTROCARDIOGRAM TRACING: CPT

## 2021-07-19 PROCEDURE — 78452 HT MUSCLE IMAGE SPECT MULT: CPT

## 2021-07-19 PROCEDURE — 81001 URINALYSIS AUTO W/SCOPE: CPT

## 2021-07-19 RX ADMIN — ZOLPIDEM TARTRATE SCH MG: 5 TABLET, FILM COATED ORAL at 22:56

## 2021-07-20 VITALS — SYSTOLIC BLOOD PRESSURE: 96 MMHG | DIASTOLIC BLOOD PRESSURE: 53 MMHG

## 2021-07-20 VITALS — DIASTOLIC BLOOD PRESSURE: 63 MMHG | SYSTOLIC BLOOD PRESSURE: 118 MMHG

## 2021-07-20 VITALS — DIASTOLIC BLOOD PRESSURE: 53 MMHG | SYSTOLIC BLOOD PRESSURE: 96 MMHG

## 2021-07-20 VITALS — SYSTOLIC BLOOD PRESSURE: 101 MMHG | DIASTOLIC BLOOD PRESSURE: 50 MMHG

## 2021-07-20 VITALS — SYSTOLIC BLOOD PRESSURE: 101 MMHG | DIASTOLIC BLOOD PRESSURE: 57 MMHG

## 2021-07-20 VITALS — SYSTOLIC BLOOD PRESSURE: 99 MMHG | DIASTOLIC BLOOD PRESSURE: 56 MMHG

## 2021-07-20 VITALS — DIASTOLIC BLOOD PRESSURE: 75 MMHG | SYSTOLIC BLOOD PRESSURE: 180 MMHG

## 2021-07-20 VITALS — DIASTOLIC BLOOD PRESSURE: 73 MMHG | SYSTOLIC BLOOD PRESSURE: 116 MMHG

## 2021-07-20 VITALS — DIASTOLIC BLOOD PRESSURE: 94 MMHG | SYSTOLIC BLOOD PRESSURE: 127 MMHG

## 2021-07-20 LAB
ALBUMIN SERPL-MCNC: 3.5 G/DL (ref 3.5–5)
ALBUMIN/GLOB SERPL: 1.2 {RATIO} (ref 0.8–2)
ALP SERPL-CCNC: 111 IU/L (ref 40–150)
ALT SERPL-CCNC: 11 IU/L (ref 0–55)
ANION GAP SERPL CALC-SCNC: 12.1 MMOL/L (ref 8–16)
BASOPHILS # BLD AUTO: 0 10*3/UL (ref 0–0.1)
BASOPHILS NFR BLD AUTO: 0.5 % (ref 0–1)
BUN SERPL-MCNC: 15 MG/DL (ref 7–26)
BUN/CREAT SERPL: 21 (ref 6–25)
CALCIUM SERPL-MCNC: 8.7 MG/DL (ref 8.4–10.2)
CHLORIDE SERPL-SCNC: 100 MMOL/L (ref 98–107)
CHOLEST SERPL-MCNC: 161 MD/DL (ref 0–199)
CHOLEST/HDLC SERPL: 2.5 {RATIO} (ref 3–3.6)
CK MB SERPL-MCNC: 0.6 NG/ML (ref 0–5)
CK SERPL-CCNC: 49 IU/L (ref 29–168)
CO2 SERPL-SCNC: 28 MMOL/L (ref 22–29)
DEPRECATED NEUTROPHILS # BLD AUTO: 2.2 10*3/UL (ref 2.1–6.9)
EGFRCR SERPLBLD CKD-EPI 2021: 79 ML/MIN (ref 60–?)
EOSINOPHIL # BLD AUTO: 0.1 10*3/UL (ref 0–0.4)
EOSINOPHIL NFR BLD AUTO: 1.5 % (ref 0–6)
ERYTHROCYTE [DISTWIDTH] IN CORD BLOOD: 15.2 % (ref 11.7–14.4)
GLOBULIN PLAS-MCNC: 2.9 G/DL (ref 2.3–3.5)
GLUCOSE SERPLBLD-MCNC: 90 MG/DL (ref 74–118)
HCT VFR BLD AUTO: 33.2 % (ref 34.2–44.1)
HDLC SERPL-MSCNC: 65 MG/DL (ref 40–60)
HGB BLD-MCNC: 10.4 G/DL (ref 12–16)
LDLC SERPL CALC-MCNC: 81 MG/DL (ref 60–130)
LYMPHOCYTES # BLD: 1.4 10*3/UL (ref 1–3.2)
LYMPHOCYTES NFR BLD AUTO: 34.6 % (ref 18–39.1)
MCH RBC QN AUTO: 26.3 PG (ref 28–32)
MCHC RBC AUTO-ENTMCNC: 31.3 G/DL (ref 31–35)
MCV RBC AUTO: 84.1 FL (ref 81–99)
MONOCYTES # BLD AUTO: 0.4 10*3/UL (ref 0.2–0.8)
MONOCYTES NFR BLD AUTO: 10.6 % (ref 4.4–11.3)
NEUTS SEG NFR BLD AUTO: 52.8 % (ref 38.7–80)
PLATELET # BLD AUTO: 220 X10E3/UL (ref 140–360)
POTASSIUM SERPL-SCNC: 4.1 MMOL/L (ref 3.5–5.1)
RBC # BLD AUTO: 3.95 X10E6/UL (ref 3.6–5.1)
SODIUM SERPL-SCNC: 136 MMOL/L (ref 136–145)
TRIGL SERPL-MCNC: 76 MG/DL (ref 0–149)

## 2021-07-20 RX ADMIN — PANTOPRAZOLE SODIUM SCH MG: 40 TABLET, DELAYED RELEASE ORAL at 09:01

## 2021-07-20 RX ADMIN — ENOXAPARIN SODIUM SCH MG: 40 INJECTION SUBCUTANEOUS at 16:52

## 2021-07-20 RX ADMIN — Medication PRN MG: at 18:49

## 2021-07-20 RX ADMIN — ALPRAZOLAM PRN MG: 1 TABLET ORAL at 11:12

## 2021-07-20 RX ADMIN — ZOLPIDEM TARTRATE SCH MG: 5 TABLET, FILM COATED ORAL at 21:17

## 2021-07-20 RX ADMIN — ALPRAZOLAM PRN MG: 1 TABLET ORAL at 16:53

## 2021-07-21 VITALS — SYSTOLIC BLOOD PRESSURE: 137 MMHG | DIASTOLIC BLOOD PRESSURE: 66 MMHG

## 2021-07-21 VITALS — SYSTOLIC BLOOD PRESSURE: 109 MMHG | DIASTOLIC BLOOD PRESSURE: 68 MMHG

## 2021-07-21 VITALS — SYSTOLIC BLOOD PRESSURE: 141 MMHG | DIASTOLIC BLOOD PRESSURE: 78 MMHG

## 2021-07-21 VITALS — SYSTOLIC BLOOD PRESSURE: 134 MMHG | DIASTOLIC BLOOD PRESSURE: 64 MMHG

## 2021-07-21 VITALS — DIASTOLIC BLOOD PRESSURE: 64 MMHG | SYSTOLIC BLOOD PRESSURE: 134 MMHG

## 2021-07-21 RX ADMIN — Medication PRN MG: at 16:14

## 2021-07-21 RX ADMIN — PANTOPRAZOLE SODIUM SCH MG: 40 TABLET, DELAYED RELEASE ORAL at 08:51

## 2021-07-21 RX ADMIN — ENOXAPARIN SODIUM SCH MG: 40 INJECTION SUBCUTANEOUS at 16:14

## 2021-07-21 RX ADMIN — Medication PRN MG: at 04:57

## 2021-07-21 RX ADMIN — ALPRAZOLAM PRN MG: 1 TABLET ORAL at 15:07

## 2021-07-21 RX ADMIN — Medication PRN MG: at 00:57

## 2021-07-21 RX ADMIN — Medication PRN MG: at 11:35
